# Patient Record
Sex: FEMALE | Race: WHITE | URBAN - METROPOLITAN AREA
[De-identification: names, ages, dates, MRNs, and addresses within clinical notes are randomized per-mention and may not be internally consistent; named-entity substitution may affect disease eponyms.]

---

## 2018-06-07 ENCOUNTER — HOSPITAL ENCOUNTER (OUTPATIENT)
Age: 78
Discharge: HOME | End: 2018-06-09
Payer: MEDICARE

## 2018-06-07 LAB
ALBUMIN SERPL-MCNC: 4.1 G/DL (ref 3.5–5.2)
ALP BLD-CCNC: 69 U/L (ref 35–104)
ALT SERPL-CCNC: 18 U/L (ref 0–32)
ANION GAP SERPL CALCULATED.3IONS-SCNC: 15 MMOL/L (ref 7–16)
AST SERPL-CCNC: 30 U/L (ref 0–31)
BASOPHILS ABSOLUTE: 0.04 E9/L (ref 0–0.2)
BASOPHILS RELATIVE PERCENT: 0.6 % (ref 0–2)
BILIRUB SERPL-MCNC: 0.2 MG/DL (ref 0–1.2)
BUN BLDV-MCNC: 11 MG/DL (ref 8–23)
CALCIUM SERPL-MCNC: 9.4 MG/DL (ref 8.6–10.2)
CHLORIDE BLD-SCNC: 97 MMOL/L (ref 98–107)
CHOLESTEROL, TOTAL: 225 MG/DL (ref 0–199)
CO2: 30 MMOL/L (ref 22–29)
CREAT SERPL-MCNC: 0.6 MG/DL (ref 0.5–1)
EOSINOPHILS ABSOLUTE: 0.22 E9/L (ref 0.05–0.5)
EOSINOPHILS RELATIVE PERCENT: 3.3 % (ref 0–6)
GFR AFRICAN AMERICAN: >60
GFR NON-AFRICAN AMERICAN: >60 ML/MIN/1.73
GLUCOSE BLD-MCNC: 99 MG/DL (ref 74–109)
HCT VFR BLD CALC: 45.7 % (ref 34–48)
HDLC SERPL-MCNC: 57 MG/DL
HEMOGLOBIN: 14.4 G/DL (ref 11.5–15.5)
IMMATURE GRANULOCYTES #: 0.01 E9/L
IMMATURE GRANULOCYTES %: 0.2 % (ref 0–5)
LDL CHOLESTEROL CALCULATED: 147 MG/DL (ref 0–99)
LYMPHOCYTES ABSOLUTE: 3.36 E9/L (ref 1.5–4)
LYMPHOCYTES RELATIVE PERCENT: 50.8 % (ref 20–42)
MCH RBC QN AUTO: 30 PG (ref 26–35)
MCHC RBC AUTO-ENTMCNC: 31.5 % (ref 32–34.5)
MCV RBC AUTO: 95.2 FL (ref 80–99.9)
MONOCYTES ABSOLUTE: 0.69 E9/L (ref 0.1–0.95)
MONOCYTES RELATIVE PERCENT: 10.4 % (ref 2–12)
NEUTROPHILS ABSOLUTE: 2.3 E9/L (ref 1.8–7.3)
NEUTROPHILS RELATIVE PERCENT: 34.7 % (ref 43–80)
PDW BLD-RTO: 14.4 FL (ref 11.5–15)
PLATELET # BLD: 243 E9/L (ref 130–450)
PMV BLD AUTO: 11.5 FL (ref 7–12)
POTASSIUM SERPL-SCNC: 4.4 MMOL/L (ref 3.5–5)
RBC # BLD: 4.8 E12/L (ref 3.5–5.5)
SODIUM BLD-SCNC: 142 MMOL/L (ref 132–146)
TOTAL PROTEIN: 8.3 G/DL (ref 6.4–8.3)
TRIGL SERPL-MCNC: 105 MG/DL (ref 0–149)
TSH SERPL DL<=0.05 MIU/L-ACNC: 2.37 UIU/ML (ref 0.27–4.2)
VITAMIN D 25-HYDROXY: 26 NG/ML (ref 30–100)
VLDLC SERPL CALC-MCNC: 21 MG/DL
WBC # BLD: 6.6 E9/L (ref 4.5–11.5)

## 2018-06-07 PROCEDURE — 80061 LIPID PANEL: CPT

## 2018-06-07 PROCEDURE — 80053 COMPREHEN METABOLIC PANEL: CPT

## 2018-06-07 PROCEDURE — 84443 ASSAY THYROID STIM HORMONE: CPT

## 2018-06-07 PROCEDURE — 82306 VITAMIN D 25 HYDROXY: CPT

## 2018-06-07 PROCEDURE — 85025 COMPLETE CBC W/AUTO DIFF WBC: CPT

## 2019-08-27 ENCOUNTER — HOSPITAL ENCOUNTER (OUTPATIENT)
Age: 79
Discharge: HOME | End: 2019-08-29
Payer: MEDICARE

## 2019-08-27 LAB
ALBUMIN SERPL-MCNC: 4.1 G/DL (ref 3.5–5.2)
ALP BLD-CCNC: 74 U/L (ref 35–104)
ALT SERPL-CCNC: 14 U/L (ref 0–32)
ANION GAP SERPL CALCULATED.3IONS-SCNC: 13 MMOL/L (ref 7–16)
AST SERPL-CCNC: 25 U/L (ref 0–31)
BASOPHILS ABSOLUTE: 0.07 E9/L (ref 0–0.2)
BASOPHILS RELATIVE PERCENT: 0.7 % (ref 0–2)
BILIRUB SERPL-MCNC: 0.2 MG/DL (ref 0–1.2)
BUN BLDV-MCNC: 12 MG/DL (ref 8–23)
CALCIUM SERPL-MCNC: 9.9 MG/DL (ref 8.6–10.2)
CHLORIDE BLD-SCNC: 97 MMOL/L (ref 98–107)
CO2: 30 MMOL/L (ref 22–29)
CREAT SERPL-MCNC: 0.6 MG/DL (ref 0.5–1)
EOSINOPHILS ABSOLUTE: 0.12 E9/L (ref 0.05–0.5)
EOSINOPHILS RELATIVE PERCENT: 1.3 % (ref 0–6)
GFR AFRICAN AMERICAN: >60
GFR NON-AFRICAN AMERICAN: >60 ML/MIN/1.73
GLUCOSE BLD-MCNC: 88 MG/DL (ref 74–99)
HCT VFR BLD CALC: 46.7 % (ref 34–48)
HEMOGLOBIN: 14.7 G/DL (ref 11.5–15.5)
IMMATURE GRANULOCYTES #: 0.02 E9/L
IMMATURE GRANULOCYTES %: 0.2 % (ref 0–5)
LYMPHOCYTES ABSOLUTE: 4 E9/L (ref 1.5–4)
LYMPHOCYTES RELATIVE PERCENT: 42.3 % (ref 20–42)
MCH RBC QN AUTO: 30.5 PG (ref 26–35)
MCHC RBC AUTO-ENTMCNC: 31.5 % (ref 32–34.5)
MCV RBC AUTO: 96.9 FL (ref 80–99.9)
MONOCYTES ABSOLUTE: 0.78 E9/L (ref 0.1–0.95)
MONOCYTES RELATIVE PERCENT: 8.3 % (ref 2–12)
NEUTROPHILS ABSOLUTE: 4.46 E9/L (ref 1.8–7.3)
NEUTROPHILS RELATIVE PERCENT: 47.2 % (ref 43–80)
PDW BLD-RTO: 14 FL (ref 11.5–15)
PLATELET # BLD: 286 E9/L (ref 130–450)
PMV BLD AUTO: 12.1 FL (ref 7–12)
POTASSIUM SERPL-SCNC: 4.6 MMOL/L (ref 3.5–5)
RBC # BLD: 4.82 E12/L (ref 3.5–5.5)
SODIUM BLD-SCNC: 140 MMOL/L (ref 132–146)
TOTAL PROTEIN: 8.3 G/DL (ref 6.4–8.3)
TSH SERPL DL<=0.05 MIU/L-ACNC: 3.01 UIU/ML (ref 0.27–4.2)
VITAMIN D 25-HYDROXY: 30 NG/ML (ref 30–100)
WBC # BLD: 9.5 E9/L (ref 4.5–11.5)

## 2019-08-27 PROCEDURE — 84443 ASSAY THYROID STIM HORMONE: CPT

## 2019-08-27 PROCEDURE — 80053 COMPREHEN METABOLIC PANEL: CPT

## 2019-08-27 PROCEDURE — 85025 COMPLETE CBC W/AUTO DIFF WBC: CPT

## 2019-08-27 PROCEDURE — 82306 VITAMIN D 25 HYDROXY: CPT

## 2020-01-03 ENCOUNTER — HOSPITAL ENCOUNTER (OUTPATIENT)
Age: 80
Discharge: HOME | End: 2020-01-05
Payer: MEDICARE

## 2020-01-03 LAB
ALBUMIN SERPL-MCNC: 4.2 G/DL (ref 3.5–5.2)
ALP BLD-CCNC: 76 U/L (ref 35–104)
ALT SERPL-CCNC: 15 U/L (ref 0–32)
ANION GAP SERPL CALCULATED.3IONS-SCNC: 14 MMOL/L (ref 7–16)
AST SERPL-CCNC: 32 U/L (ref 0–31)
BILIRUB SERPL-MCNC: 0.5 MG/DL (ref 0–1.2)
BUN BLDV-MCNC: 14 MG/DL (ref 8–23)
CALCIUM SERPL-MCNC: 9.6 MG/DL (ref 8.6–10.2)
CHLORIDE BLD-SCNC: 98 MMOL/L (ref 98–107)
CHOLESTEROL, TOTAL: 259 MG/DL (ref 0–199)
CO2: 27 MMOL/L (ref 22–29)
CREAT SERPL-MCNC: 0.6 MG/DL (ref 0.5–1)
GFR AFRICAN AMERICAN: >60
GFR NON-AFRICAN AMERICAN: >60 ML/MIN/1.73
GLUCOSE BLD-MCNC: 103 MG/DL (ref 74–99)
HDLC SERPL-MCNC: 50 MG/DL
LDL CHOLESTEROL CALCULATED: 184 MG/DL (ref 0–99)
POTASSIUM SERPL-SCNC: 4.6 MMOL/L (ref 3.5–5)
SODIUM BLD-SCNC: 139 MMOL/L (ref 132–146)
TOTAL PROTEIN: 8.4 G/DL (ref 6.4–8.3)
TRIGL SERPL-MCNC: 123 MG/DL (ref 0–149)
VLDLC SERPL CALC-MCNC: 25 MG/DL

## 2020-01-03 PROCEDURE — 80061 LIPID PANEL: CPT

## 2020-01-03 PROCEDURE — 80053 COMPREHEN METABOLIC PANEL: CPT

## 2020-09-03 ENCOUNTER — HOSPITAL ENCOUNTER (OUTPATIENT)
Age: 80
Discharge: HOME | End: 2020-09-05
Payer: MEDICARE

## 2020-09-03 LAB
ALBUMIN SERPL-MCNC: 4 G/DL (ref 3.5–5.2)
ALP BLD-CCNC: 70 U/L (ref 35–104)
ALT SERPL-CCNC: 15 U/L (ref 0–32)
ANION GAP SERPL CALCULATED.3IONS-SCNC: 14 MMOL/L (ref 7–16)
AST SERPL-CCNC: 34 U/L (ref 0–31)
BASOPHILS ABSOLUTE: 0.04 E9/L (ref 0–0.2)
BASOPHILS RELATIVE PERCENT: 0.5 % (ref 0–2)
BILIRUB SERPL-MCNC: 0.4 MG/DL (ref 0–1.2)
BUN BLDV-MCNC: 17 MG/DL (ref 8–23)
CALCIUM SERPL-MCNC: 9.3 MG/DL (ref 8.6–10.2)
CHLORIDE BLD-SCNC: 96 MMOL/L (ref 98–107)
CHOLESTEROL, TOTAL: 246 MG/DL (ref 0–199)
CO2: 27 MMOL/L (ref 22–29)
CREAT SERPL-MCNC: 0.8 MG/DL (ref 0.5–1)
EOSINOPHILS ABSOLUTE: 0.14 E9/L (ref 0.05–0.5)
EOSINOPHILS RELATIVE PERCENT: 1.6 % (ref 0–6)
GFR AFRICAN AMERICAN: >60
GFR NON-AFRICAN AMERICAN: >60 ML/MIN/1.73
GLUCOSE BLD-MCNC: 104 MG/DL (ref 74–99)
HCT VFR BLD CALC: 44.6 % (ref 34–48)
HDLC SERPL-MCNC: 56 MG/DL
HEMOGLOBIN: 13.9 G/DL (ref 11.5–15.5)
IMMATURE GRANULOCYTES #: 0.01 E9/L
IMMATURE GRANULOCYTES %: 0.1 % (ref 0–5)
LDL CHOLESTEROL CALCULATED: 165 MG/DL (ref 0–99)
LYMPHOCYTES ABSOLUTE: 3.06 E9/L (ref 1.5–4)
LYMPHOCYTES RELATIVE PERCENT: 35.9 % (ref 20–42)
MCH RBC QN AUTO: 29.5 PG (ref 26–35)
MCHC RBC AUTO-ENTMCNC: 31.2 % (ref 32–34.5)
MCV RBC AUTO: 94.7 FL (ref 80–99.9)
MONOCYTES ABSOLUTE: 0.7 E9/L (ref 0.1–0.95)
MONOCYTES RELATIVE PERCENT: 8.2 % (ref 2–12)
NEUTROPHILS ABSOLUTE: 4.57 E9/L (ref 1.8–7.3)
NEUTROPHILS RELATIVE PERCENT: 53.7 % (ref 43–80)
PDW BLD-RTO: 13.8 FL (ref 11.5–15)
PLATELET # BLD: 303 E9/L (ref 130–450)
PMV BLD AUTO: 12.3 FL (ref 7–12)
POTASSIUM SERPL-SCNC: 4.4 MMOL/L (ref 3.5–5)
RBC # BLD: 4.71 E12/L (ref 3.5–5.5)
SODIUM BLD-SCNC: 137 MMOL/L (ref 132–146)
TOTAL PROTEIN: 7.7 G/DL (ref 6.4–8.3)
TRIGL SERPL-MCNC: 124 MG/DL (ref 0–149)
TSH SERPL DL<=0.05 MIU/L-ACNC: 2.41 UIU/ML (ref 0.27–4.2)
VITAMIN D 25-HYDROXY: 26 NG/ML (ref 30–100)
VLDLC SERPL CALC-MCNC: 25 MG/DL
WBC # BLD: 8.5 E9/L (ref 4.5–11.5)

## 2020-09-03 PROCEDURE — 85025 COMPLETE CBC W/AUTO DIFF WBC: CPT

## 2020-09-03 PROCEDURE — 84443 ASSAY THYROID STIM HORMONE: CPT

## 2020-09-03 PROCEDURE — 80061 LIPID PANEL: CPT

## 2020-09-03 PROCEDURE — 80053 COMPREHEN METABOLIC PANEL: CPT

## 2020-09-03 PROCEDURE — 82306 VITAMIN D 25 HYDROXY: CPT

## 2022-10-31 ENCOUNTER — HOSPITAL ENCOUNTER (OUTPATIENT)
Age: 82
Discharge: HOME | End: 2022-10-31
Payer: MEDICARE

## 2022-10-31 PROCEDURE — 88350 IMFLUOR EA ADDL 1ANTB STN PX: CPT

## 2022-10-31 PROCEDURE — 83516 IMMUNOASSAY NONANTIBODY: CPT

## 2022-10-31 PROCEDURE — 88346 IMFLUOR 1ST 1ANTB STAIN PX: CPT

## 2022-11-08 LAB
Lab: NORMAL
REPORT: NORMAL
THIS TEST SENT TO: NORMAL

## 2023-02-14 ENCOUNTER — HOSPITAL ENCOUNTER (OUTPATIENT)
Dept: GENERAL RADIOLOGY | Age: 83
Discharge: HOME | End: 2023-02-16
Payer: MEDICARE

## 2023-02-14 ENCOUNTER — HOSPITAL ENCOUNTER (OUTPATIENT)
Age: 83
Discharge: HOME | End: 2023-02-14
Payer: MEDICARE

## 2023-02-14 ENCOUNTER — HOSPITAL ENCOUNTER (OUTPATIENT)
Age: 83
Discharge: HOME | End: 2023-02-16
Payer: MEDICARE

## 2023-02-14 DIAGNOSIS — R06.02 SOB (SHORTNESS OF BREATH): ICD-10-CM

## 2023-02-14 LAB
B.E.: 7.6 MMOL/L (ref -3–3)
COHB: 0.9 % (ref 0–1.5)
COMMENT: ABNORMAL
CRITICAL: ABNORMAL
DATE ANALYZED: ABNORMAL
DATE OF COLLECTION: ABNORMAL
HCO3: 33.7 MMOL/L (ref 22–26)
HHB: 21.7 % (ref 0–5)
LAB: ABNORMAL
Lab: ABNORMAL
METHB: 0.4 % (ref 0–1.5)
MODE: ABNORMAL
O2 CONTENT: 12.8 ML/DL
O2 SATURATION: 78 % (ref 92–98.5)
O2HB: 77 % (ref 94–97)
OPERATOR ID: 1119
PATIENT TEMP: 37 C
PCO2: 54.7 MMHG (ref 35–45)
PH BLOOD GAS: 7.41 (ref 7.35–7.45)
PO2: 44.3 MMHG (ref 75–100)
SOURCE, BLOOD GAS: ABNORMAL
THB: 11.8 G/DL (ref 11.5–16.5)
TIME ANALYZED: 1650

## 2023-02-14 PROCEDURE — 36600 WITHDRAWAL OF ARTERIAL BLOOD: CPT

## 2023-02-14 PROCEDURE — 71046 X-RAY EXAM CHEST 2 VIEWS: CPT

## 2023-02-14 PROCEDURE — 82805 BLOOD GASES W/O2 SATURATION: CPT

## 2023-02-24 ENCOUNTER — APPOINTMENT (OUTPATIENT)
Dept: GENERAL RADIOLOGY | Age: 83
DRG: 981 | End: 2023-02-24
Payer: MEDICARE

## 2023-02-24 ENCOUNTER — HOSPITAL ENCOUNTER (INPATIENT)
Age: 83
LOS: 10 days | Discharge: NURSING, REHAB OR LONG-TERM CARE | DRG: 981 | End: 2023-03-06
Attending: EMERGENCY MEDICINE | Admitting: FAMILY MEDICINE
Payer: MEDICARE

## 2023-02-24 DIAGNOSIS — J96.01 ACUTE RESPIRATORY FAILURE WITH HYPOXIA AND HYPERCAPNIA (HCC): Primary | ICD-10-CM

## 2023-02-24 DIAGNOSIS — J96.02 ACUTE RESPIRATORY FAILURE WITH HYPOXIA AND HYPERCAPNIA (HCC): Primary | ICD-10-CM

## 2023-02-24 DIAGNOSIS — R77.8 ELEVATED TROPONIN: ICD-10-CM

## 2023-02-24 DIAGNOSIS — S82.891A CLOSED FRACTURE OF RIGHT ANKLE, INITIAL ENCOUNTER: ICD-10-CM

## 2023-02-24 DIAGNOSIS — D64.9 ANEMIA, UNSPECIFIED TYPE: ICD-10-CM

## 2023-02-24 DIAGNOSIS — M25.551 PAIN OF RIGHT HIP JOINT: ICD-10-CM

## 2023-02-24 DIAGNOSIS — G89.18 ACUTE POST-OPERATIVE PAIN: ICD-10-CM

## 2023-02-24 PROBLEM — J96.22 ACUTE ON CHRONIC RESPIRATORY FAILURE WITH HYPOXIA AND HYPERCAPNIA (HCC): Status: ACTIVE | Noted: 2023-02-24

## 2023-02-24 PROBLEM — L10.2 PEMPHIGUS FOLIACEOUS: Status: ACTIVE | Noted: 2023-02-24

## 2023-02-24 PROBLEM — R79.89 ELEVATED TROPONIN: Status: ACTIVE | Noted: 2023-02-24

## 2023-02-24 PROBLEM — J96.21 ACUTE ON CHRONIC RESPIRATORY FAILURE WITH HYPOXIA AND HYPERCAPNIA (HCC): Status: ACTIVE | Noted: 2023-02-24

## 2023-02-24 PROBLEM — N17.9 AKI (ACUTE KIDNEY INJURY) (HCC): Status: ACTIVE | Noted: 2023-02-24

## 2023-02-24 PROBLEM — I10 PRIMARY HYPERTENSION: Status: ACTIVE | Noted: 2023-02-24

## 2023-02-24 PROBLEM — D50.9 IRON DEFICIENCY ANEMIA: Status: ACTIVE | Noted: 2023-02-24

## 2023-02-24 LAB
AADO2: 315.1 MMHG
AADO2: 368.6 MMHG
AADO2: 380.1 MMHG
AADO2: 451.5 MMHG
ANION GAP SERPL CALCULATED.3IONS-SCNC: 21 MMOL/L (ref 7–16)
ANISOCYTOSIS: ABNORMAL
B.E.: 10.5 MMOL/L (ref -3–3)
B.E.: 2.4 MMOL/L (ref -3–3)
B.E.: 7 MMOL/L (ref -3–3)
B.E.: 8.3 MMOL/L (ref -3–3)
B.E.: 9.2 MMOL/L (ref -3–3)
BASOPHILS ABSOLUTE: 0 E9/L (ref 0–0.2)
BASOPHILS RELATIVE PERCENT: 0.4 % (ref 0–2)
BUN BLDV-MCNC: 33 MG/DL (ref 6–23)
BURR CELLS: ABNORMAL
CALCIUM SERPL-MCNC: 8.2 MG/DL (ref 8.6–10.2)
CHLORIDE BLD-SCNC: 83 MMOL/L (ref 98–107)
CHLORIDE URINE RANDOM: <20 MMOL/L
CO2: 31 MMOL/L (ref 22–29)
COHB: 0.3 % (ref 0–1.5)
COHB: 0.5 % (ref 0–1.5)
COHB: 0.7 % (ref 0–1.5)
COHB: 0.7 % (ref 0–1.5)
COMMENT: ABNORMAL
CREAT SERPL-MCNC: 1.2 MG/DL (ref 0.5–1)
CRITICAL NOTIFICATION: YES
CRITICAL: ABNORMAL
DATE ANALYZED: ABNORMAL
DATE OF COLLECTION: ABNORMAL
DELIVERY SYSTEMS: ABNORMAL
DEVICE: ABNORMAL
EKG ATRIAL RATE: 111 BPM
EKG P AXIS: 37 DEGREES
EKG P-R INTERVAL: 140 MS
EKG Q-T INTERVAL: 338 MS
EKG QRS DURATION: 104 MS
EKG QTC CALCULATION (BAZETT): 459 MS
EKG R AXIS: 38 DEGREES
EKG T AXIS: 26 DEGREES
EKG VENTRICULAR RATE: 111 BPM
EOSINOPHILS ABSOLUTE: 0 E9/L (ref 0.05–0.5)
EOSINOPHILS RELATIVE PERCENT: 0.1 % (ref 0–6)
FIO2: 100
FIO2: 100 %
FIO2: 75 %
FIO2: 80 %
FIO2: 80 %
GFR SERPL CREATININE-BSD FRML MDRD: 45 ML/MIN/1.73
GLUCOSE BLD-MCNC: 213 MG/DL (ref 74–99)
HBA1C MFR BLD: 6.7 % (ref 4–5.6)
HCO3: 34.1 MMOL/L (ref 22–26)
HCO3: 36 MMOL/L (ref 22–26)
HCO3: 38.1 MMOL/L (ref 22–26)
HCO3: 39.6 MMOL/L (ref 22–26)
HCO3: 41.7 MMOL/L (ref 22–26)
HCT VFR BLD CALC: 36.4 % (ref 34–48)
HEMOGLOBIN: 10 G/DL (ref 11.5–15.5)
HHB: 1.9 % (ref 0–5)
HHB: 10.8 % (ref 0–5)
HHB: 12 % (ref 0–5)
HHB: 2.1 % (ref 0–5)
HYPOCHROMIA: ABNORMAL
INFLUENZA A BY PCR: NOT DETECTED
INFLUENZA B BY PCR: NOT DETECTED
IRON SATURATION: 7 % (ref 15–50)
IRON: 26 MCG/DL (ref 37–145)
LAB: ABNORMAL
LV EF: 60 %
LVEF MODALITY: NORMAL
LYMPHOCYTES ABSOLUTE: 0.68 E9/L (ref 1.5–4)
LYMPHOCYTES RELATIVE PERCENT: 5.2 % (ref 20–42)
Lab: ABNORMAL
MCH RBC QN AUTO: 22.1 PG (ref 26–35)
MCHC RBC AUTO-ENTMCNC: 27.5 % (ref 32–34.5)
MCV RBC AUTO: 80.5 FL (ref 80–99.9)
METHB: 0.5 % (ref 0–1.5)
METHB: 0.6 % (ref 0–1.5)
MODE: ABNORMAL
MONOCYTES ABSOLUTE: 0.82 E9/L (ref 0.1–0.95)
MONOCYTES RELATIVE PERCENT: 6.1 % (ref 2–12)
NEUTROPHILS ABSOLUTE: 12.1 E9/L (ref 1.8–7.3)
NEUTROPHILS RELATIVE PERCENT: 88.7 % (ref 43–80)
NUCLEATED RED BLOOD CELLS: 1.7 /100 WBC
O2 CONTENT: 14 ML/DL
O2 CONTENT: 15.2 ML/DL
O2 CONTENT: 15.6 ML/DL
O2 CONTENT: 16.7 ML/DL
O2 SATURATION: 87.9 % (ref 92–98.5)
O2 SATURATION: 89.1 % (ref 92–98.5)
O2 SATURATION: 95.7 % (ref 92–98.5)
O2 SATURATION: 97.9 % (ref 92–98.5)
O2 SATURATION: 98.1 % (ref 92–98.5)
O2HB: 87 % (ref 94–97)
O2HB: 88 % (ref 94–97)
O2HB: 96.6 % (ref 94–97)
O2HB: 97.3 % (ref 94–97)
OPERATOR ID: 2323
OPERATOR ID: 274
OPERATOR ID: ABNORMAL
PATIENT TEMP: 37
PATIENT TEMP: 37 C
PCO2 (TEMP CORRECTED): 90.9 MMHG (ref 35–45)
PCO2: 105.4 MMHG (ref 35–45)
PCO2: 117.6 MMHG (ref 35–45)
PCO2: 52.2 MMHG (ref 35–45)
PCO2: 98.4 MMHG (ref 35–45)
PDW BLD-RTO: 19.6 FL (ref 11.5–15)
PEEP/CPAP: 6 CMH2O
PEEP/CPAP: 8 CMH2O
PFO2: 0.89 MMHG/%
PFO2: 1.01 MMHG/%
PFO2: 1.38 MMHG/%
PFO2: 1.44 MMHG/%
PH (TEMPERATURE CORRECTED): 7.18 (ref 7.35–7.45)
PH BLOOD GAS: 7.17 (ref 7.35–7.45)
PH BLOOD GAS: 7.19 (ref 7.35–7.45)
PH BLOOD GAS: 7.21 (ref 7.35–7.45)
PH BLOOD GAS: 7.46 (ref 7.35–7.45)
PLATELET # BLD: 400 E9/L (ref 130–450)
PMV BLD AUTO: 10.8 FL (ref 7–12)
PO2 (TEMP CORRECTED): 104.3 MMHG (ref 60–80)
PO2: 115.5 MMHG (ref 75–100)
PO2: 138.1 MMHG (ref 75–100)
PO2: 71.1 MMHG (ref 75–100)
PO2: 76 MMHG (ref 75–100)
POC SOURCE: ABNORMAL
POIKILOCYTES: ABNORMAL
POLYCHROMASIA: ABNORMAL
POTASSIUM SERPL-SCNC: 3.6 MMOL/L (ref 3.5–5)
POTASSIUM, UR: 45.1 MMOL/L
PRO-BNP: ABNORMAL PG/ML (ref 0–450)
PS: 16 CMH20
PS: 20 CMH20
RBC # BLD: 4.52 E12/L (ref 3.5–5.5)
RI(T): 3.27
RI(T): 3.29
RI(T): 4.15
RI(T): 5.18
SARS-COV-2, NAAT: NOT DETECTED
SODIUM BLD-SCNC: 135 MMOL/L (ref 132–146)
SODIUM URINE: <20 MMOL/L
SOURCE, BLOOD GAS: ABNORMAL
TARGET CELLS: ABNORMAL
THB: 11.3 G/DL (ref 11.5–16.5)
THB: 11.4 G/DL (ref 11.5–16.5)
THB: 12.1 G/DL (ref 11.5–16.5)
THB: 12.2 G/DL (ref 11.5–16.5)
TIME ANALYZED: 1213
TIME ANALYZED: 1246
TIME ANALYZED: 818
TIME ANALYZED: 949
TOTAL IRON BINDING CAPACITY: 370 MCG/DL (ref 250–450)
TROPONIN, HIGH SENSITIVITY: 51 NG/L (ref 0–9)
TROPONIN, HIGH SENSITIVITY: 66 NG/L (ref 0–9)
WBC # BLD: 13.6 E9/L (ref 4.5–11.5)

## 2023-02-24 PROCEDURE — 99285 EMERGENCY DEPT VISIT HI MDM: CPT

## 2023-02-24 PROCEDURE — 51702 INSERT TEMP BLADDER CATH: CPT

## 2023-02-24 PROCEDURE — 2500000003 HC RX 250 WO HCPCS: Performed by: INTERNAL MEDICINE

## 2023-02-24 PROCEDURE — 36415 COLL VENOUS BLD VENIPUNCTURE: CPT

## 2023-02-24 PROCEDURE — 36569 INSJ PICC 5 YR+ W/O IMAGING: CPT

## 2023-02-24 PROCEDURE — 85025 COMPLETE CBC W/AUTO DIFF WBC: CPT

## 2023-02-24 PROCEDURE — 87635 SARS-COV-2 COVID-19 AMP PRB: CPT

## 2023-02-24 PROCEDURE — 2580000003 HC RX 258: Performed by: INTERNAL MEDICINE

## 2023-02-24 PROCEDURE — C1751 CATH, INF, PER/CENT/MIDLINE: HCPCS

## 2023-02-24 PROCEDURE — 2580000003 HC RX 258: Performed by: EMERGENCY MEDICINE

## 2023-02-24 PROCEDURE — 36600 WITHDRAWAL OF ARTERIAL BLOOD: CPT

## 2023-02-24 PROCEDURE — 94660 CPAP INITIATION&MGMT: CPT

## 2023-02-24 PROCEDURE — 6360000002 HC RX W HCPCS: Performed by: INTERNAL MEDICINE

## 2023-02-24 PROCEDURE — 94640 AIRWAY INHALATION TREATMENT: CPT

## 2023-02-24 PROCEDURE — 83540 ASSAY OF IRON: CPT

## 2023-02-24 PROCEDURE — 73610 X-RAY EXAM OF ANKLE: CPT

## 2023-02-24 PROCEDURE — 99223 1ST HOSP IP/OBS HIGH 75: CPT | Performed by: FAMILY MEDICINE

## 2023-02-24 PROCEDURE — 82803 BLOOD GASES ANY COMBINATION: CPT

## 2023-02-24 PROCEDURE — 80048 BASIC METABOLIC PNL TOTAL CA: CPT

## 2023-02-24 PROCEDURE — 2720000010 HC SURG SUPPLY STERILE

## 2023-02-24 PROCEDURE — 6370000000 HC RX 637 (ALT 250 FOR IP): Performed by: EMERGENCY MEDICINE

## 2023-02-24 PROCEDURE — 83880 ASSAY OF NATRIURETIC PEPTIDE: CPT

## 2023-02-24 PROCEDURE — 73590 X-RAY EXAM OF LOWER LEG: CPT

## 2023-02-24 PROCEDURE — 71045 X-RAY EXAM CHEST 1 VIEW: CPT

## 2023-02-24 PROCEDURE — 73560 X-RAY EXAM OF KNEE 1 OR 2: CPT

## 2023-02-24 PROCEDURE — C8929 TTE W OR WO FOL WCON,DOPPLER: HCPCS

## 2023-02-24 PROCEDURE — 6360000004 HC RX CONTRAST MEDICATION: Performed by: INTERNAL MEDICINE

## 2023-02-24 PROCEDURE — 83550 IRON BINDING TEST: CPT

## 2023-02-24 PROCEDURE — 76937 US GUIDE VASCULAR ACCESS: CPT

## 2023-02-24 PROCEDURE — 87502 INFLUENZA DNA AMP PROBE: CPT

## 2023-02-24 PROCEDURE — 2000000000 HC ICU R&B

## 2023-02-24 PROCEDURE — 93010 ELECTROCARDIOGRAM REPORT: CPT | Performed by: INTERNAL MEDICINE

## 2023-02-24 PROCEDURE — 73502 X-RAY EXAM HIP UNI 2-3 VIEWS: CPT

## 2023-02-24 PROCEDURE — 94664 DEMO&/EVAL PT USE INHALER: CPT

## 2023-02-24 PROCEDURE — 84133 ASSAY OF URINE POTASSIUM: CPT

## 2023-02-24 PROCEDURE — 83036 HEMOGLOBIN GLYCOSYLATED A1C: CPT

## 2023-02-24 PROCEDURE — 84300 ASSAY OF URINE SODIUM: CPT

## 2023-02-24 PROCEDURE — 93005 ELECTROCARDIOGRAM TRACING: CPT | Performed by: EMERGENCY MEDICINE

## 2023-02-24 PROCEDURE — 84484 ASSAY OF TROPONIN QUANT: CPT

## 2023-02-24 PROCEDURE — 82436 ASSAY OF URINE CHLORIDE: CPT

## 2023-02-24 PROCEDURE — 2700000000 HC OXYGEN THERAPY PER DAY

## 2023-02-24 PROCEDURE — 82805 BLOOD GASES W/O2 SATURATION: CPT

## 2023-02-24 RX ORDER — IPRATROPIUM BROMIDE AND ALBUTEROL SULFATE 2.5; .5 MG/3ML; MG/3ML
3 SOLUTION RESPIRATORY (INHALATION) ONCE
Status: COMPLETED | OUTPATIENT
Start: 2023-02-24 | End: 2023-02-24

## 2023-02-24 RX ORDER — ARFORMOTEROL TARTRATE 15 UG/2ML
15 SOLUTION RESPIRATORY (INHALATION) 2 TIMES DAILY
Status: DISCONTINUED | OUTPATIENT
Start: 2023-02-24 | End: 2023-03-06 | Stop reason: HOSPADM

## 2023-02-24 RX ORDER — ONDANSETRON 4 MG/1
4 TABLET, ORALLY DISINTEGRATING ORAL EVERY 8 HOURS PRN
Status: DISCONTINUED | OUTPATIENT
Start: 2023-02-24 | End: 2023-03-06 | Stop reason: HOSPADM

## 2023-02-24 RX ORDER — SODIUM CHLORIDE 9 MG/ML
INJECTION, SOLUTION INTRAVENOUS PRN
Status: DISCONTINUED | OUTPATIENT
Start: 2023-02-24 | End: 2023-03-06 | Stop reason: HOSPADM

## 2023-02-24 RX ORDER — SODIUM CHLORIDE 0.9 % (FLUSH) 0.9 %
5-40 SYRINGE (ML) INJECTION EVERY 12 HOURS SCHEDULED
Status: DISCONTINUED | OUTPATIENT
Start: 2023-02-24 | End: 2023-03-06 | Stop reason: HOSPADM

## 2023-02-24 RX ORDER — HEPARIN SODIUM (PORCINE) LOCK FLUSH IV SOLN 100 UNIT/ML 100 UNIT/ML
3 SOLUTION INTRAVENOUS PRN
Status: DISCONTINUED | OUTPATIENT
Start: 2023-02-24 | End: 2023-03-06 | Stop reason: HOSPADM

## 2023-02-24 RX ORDER — SODIUM CHLORIDE 0.9 % (FLUSH) 0.9 %
5-40 SYRINGE (ML) INJECTION PRN
Status: DISCONTINUED | OUTPATIENT
Start: 2023-02-24 | End: 2023-03-06 | Stop reason: HOSPADM

## 2023-02-24 RX ORDER — NOREPINEPHRINE BIT/0.9 % NACL 16MG/250ML
1-100 INFUSION BOTTLE (ML) INTRAVENOUS CONTINUOUS
Status: DISCONTINUED | OUTPATIENT
Start: 2023-02-24 | End: 2023-02-26

## 2023-02-24 RX ORDER — ACETAMINOPHEN 650 MG/1
650 SUPPOSITORY RECTAL EVERY 6 HOURS PRN
Status: DISCONTINUED | OUTPATIENT
Start: 2023-02-24 | End: 2023-03-06 | Stop reason: HOSPADM

## 2023-02-24 RX ORDER — SODIUM CHLORIDE 9 MG/ML
INJECTION, SOLUTION INTRAVENOUS PRN
Status: DISCONTINUED | OUTPATIENT
Start: 2023-02-24 | End: 2023-02-25

## 2023-02-24 RX ORDER — ALBUTEROL SULFATE 2.5 MG/3ML
2.5 SOLUTION RESPIRATORY (INHALATION)
Status: DISCONTINUED | OUTPATIENT
Start: 2023-02-24 | End: 2023-03-06 | Stop reason: HOSPADM

## 2023-02-24 RX ORDER — ATORVASTATIN CALCIUM 10 MG/1
10 TABLET, FILM COATED ORAL DAILY
Status: DISCONTINUED | OUTPATIENT
Start: 2023-02-24 | End: 2023-03-06 | Stop reason: HOSPADM

## 2023-02-24 RX ORDER — ONDANSETRON 2 MG/ML
4 INJECTION INTRAMUSCULAR; INTRAVENOUS EVERY 6 HOURS PRN
Status: DISCONTINUED | OUTPATIENT
Start: 2023-02-24 | End: 2023-03-06 | Stop reason: HOSPADM

## 2023-02-24 RX ORDER — LIDOCAINE HYDROCHLORIDE 10 MG/ML
5 INJECTION, SOLUTION EPIDURAL; INFILTRATION; INTRACAUDAL; PERINEURAL ONCE
Status: COMPLETED | OUTPATIENT
Start: 2023-02-24 | End: 2023-02-24

## 2023-02-24 RX ORDER — DEXTROSE MONOHYDRATE 100 MG/ML
INJECTION, SOLUTION INTRAVENOUS CONTINUOUS PRN
Status: DISCONTINUED | OUTPATIENT
Start: 2023-02-24 | End: 2023-02-27 | Stop reason: SDUPTHER

## 2023-02-24 RX ORDER — POLYETHYLENE GLYCOL 3350 17 G/17G
17 POWDER, FOR SOLUTION ORAL DAILY PRN
Status: DISCONTINUED | OUTPATIENT
Start: 2023-02-24 | End: 2023-03-06 | Stop reason: HOSPADM

## 2023-02-24 RX ORDER — 0.9 % SODIUM CHLORIDE 0.9 %
1000 INTRAVENOUS SOLUTION INTRAVENOUS ONCE
Status: COMPLETED | OUTPATIENT
Start: 2023-02-24 | End: 2023-02-24

## 2023-02-24 RX ORDER — IPRATROPIUM BROMIDE AND ALBUTEROL SULFATE 2.5; .5 MG/3ML; MG/3ML
1 SOLUTION RESPIRATORY (INHALATION)
Status: DISCONTINUED | OUTPATIENT
Start: 2023-02-24 | End: 2023-02-24 | Stop reason: CLARIF

## 2023-02-24 RX ORDER — ETOMIDATE 2 MG/ML
30 INJECTION INTRAVENOUS ONCE
Status: DISCONTINUED | OUTPATIENT
Start: 2023-02-24 | End: 2023-02-24

## 2023-02-24 RX ORDER — DEXMEDETOMIDINE HYDROCHLORIDE 4 UG/ML
.1-1.5 INJECTION, SOLUTION INTRAVENOUS CONTINUOUS
Status: DISCONTINUED | OUTPATIENT
Start: 2023-02-24 | End: 2023-02-25

## 2023-02-24 RX ORDER — HEPARIN SODIUM (PORCINE) LOCK FLUSH IV SOLN 100 UNIT/ML 100 UNIT/ML
3 SOLUTION INTRAVENOUS EVERY 12 HOURS SCHEDULED
Status: DISCONTINUED | OUTPATIENT
Start: 2023-02-24 | End: 2023-03-06 | Stop reason: HOSPADM

## 2023-02-24 RX ORDER — ENOXAPARIN SODIUM 100 MG/ML
30 INJECTION SUBCUTANEOUS 2 TIMES DAILY
Status: DISCONTINUED | OUTPATIENT
Start: 2023-02-24 | End: 2023-03-02

## 2023-02-24 RX ORDER — ACETAMINOPHEN 325 MG/1
650 TABLET ORAL EVERY 6 HOURS PRN
Status: DISCONTINUED | OUTPATIENT
Start: 2023-02-24 | End: 2023-03-06 | Stop reason: HOSPADM

## 2023-02-24 RX ORDER — ROCURONIUM BROMIDE 10 MG/ML
100 INJECTION, SOLUTION INTRAVENOUS ONCE
Status: DISCONTINUED | OUTPATIENT
Start: 2023-02-24 | End: 2023-02-24

## 2023-02-24 RX ORDER — SODIUM CHLORIDE 9 MG/ML
INJECTION, SOLUTION INTRAVENOUS CONTINUOUS
Status: DISCONTINUED | OUTPATIENT
Start: 2023-02-24 | End: 2023-02-26

## 2023-02-24 RX ADMIN — ALBUTEROL SULFATE 2.5 MG: 2.5 SOLUTION RESPIRATORY (INHALATION) at 07:33

## 2023-02-24 RX ADMIN — ALBUTEROL SULFATE 2.5 MG: 2.5 SOLUTION RESPIRATORY (INHALATION) at 18:10

## 2023-02-24 RX ADMIN — ENOXAPARIN SODIUM 30 MG: 100 INJECTION SUBCUTANEOUS at 07:48

## 2023-02-24 RX ADMIN — NOREPINEPHRINE BITARTRATE 5 MCG/MIN: 1 SOLUTION INTRAVENOUS at 10:17

## 2023-02-24 RX ADMIN — WATER 1000 MG: 1 INJECTION INTRAMUSCULAR; INTRAVENOUS; SUBCUTANEOUS at 07:47

## 2023-02-24 RX ADMIN — PERFLUTREN 1.5 ML: 6.52 INJECTION, SUSPENSION INTRAVENOUS at 10:59

## 2023-02-24 RX ADMIN — LIDOCAINE HYDROCHLORIDE 5 ML: 10 SOLUTION INTRAVENOUS at 12:11

## 2023-02-24 RX ADMIN — Medication 10 ML: at 21:00

## 2023-02-24 RX ADMIN — HYDROCORTISONE SODIUM SUCCINATE 100 MG: 100 INJECTION, POWDER, FOR SOLUTION INTRAMUSCULAR; INTRAVENOUS at 18:42

## 2023-02-24 RX ADMIN — IPRATROPIUM BROMIDE 0.5 MG: 0.5 SOLUTION RESPIRATORY (INHALATION) at 18:10

## 2023-02-24 RX ADMIN — Medication 10 ML: at 07:48

## 2023-02-24 RX ADMIN — IPRATROPIUM BROMIDE AND ALBUTEROL SULFATE 3 AMPULE: .5; 3 SOLUTION RESPIRATORY (INHALATION) at 05:20

## 2023-02-24 RX ADMIN — ALBUTEROL SULFATE 2.5 MG: 2.5 SOLUTION RESPIRATORY (INHALATION) at 15:10

## 2023-02-24 RX ADMIN — AZITHROMYCIN DIHYDRATE 500 MG: 500 INJECTION, POWDER, LYOPHILIZED, FOR SOLUTION INTRAVENOUS at 07:51

## 2023-02-24 RX ADMIN — ANTI-FUNGAL POWDER MICONAZOLE NITRATE TALC FREE: 1.42 POWDER TOPICAL at 15:49

## 2023-02-24 RX ADMIN — SODIUM CHLORIDE 1000 ML: 9 INJECTION, SOLUTION INTRAVENOUS at 05:57

## 2023-02-24 RX ADMIN — Medication 0.2 MCG/KG/HR: at 10:57

## 2023-02-24 RX ADMIN — ENOXAPARIN SODIUM 30 MG: 100 INJECTION SUBCUTANEOUS at 21:00

## 2023-02-24 RX ADMIN — ARFORMOTEROL TARTRATE 15 MCG: 15 SOLUTION RESPIRATORY (INHALATION) at 07:34

## 2023-02-24 RX ADMIN — ARFORMOTEROL TARTRATE 15 MCG: 15 SOLUTION RESPIRATORY (INHALATION) at 18:10

## 2023-02-24 RX ADMIN — ANTI-FUNGAL POWDER MICONAZOLE NITRATE TALC FREE: 1.42 POWDER TOPICAL at 21:00

## 2023-02-24 RX ADMIN — IPRATROPIUM BROMIDE 0.5 MG: 0.5 SOLUTION RESPIRATORY (INHALATION) at 07:33

## 2023-02-24 RX ADMIN — ONDANSETRON 4 MG: 2 INJECTION INTRAMUSCULAR; INTRAVENOUS at 07:49

## 2023-02-24 RX ADMIN — SODIUM CHLORIDE: 9 INJECTION, SOLUTION INTRAVENOUS at 10:20

## 2023-02-24 RX ADMIN — IPRATROPIUM BROMIDE 0.5 MG: 0.5 SOLUTION RESPIRATORY (INHALATION) at 15:10

## 2023-02-24 ASSESSMENT — PAIN DESCRIPTION - DESCRIPTORS
DESCRIPTORS: ACHING
DESCRIPTORS: ACHING

## 2023-02-24 ASSESSMENT — PAIN DESCRIPTION - PAIN TYPE: TYPE: ACUTE PAIN

## 2023-02-24 ASSESSMENT — PAIN SCALES - GENERAL
PAINLEVEL_OUTOF10: 0
PAINLEVEL_OUTOF10: 9
PAINLEVEL_OUTOF10: 10
PAINLEVEL_OUTOF10: 0

## 2023-02-24 ASSESSMENT — PAIN DESCRIPTION - FREQUENCY
FREQUENCY: CONTINUOUS
FREQUENCY: CONTINUOUS

## 2023-02-24 ASSESSMENT — PAIN DESCRIPTION - LOCATION
LOCATION: ANKLE
LOCATION: ANKLE

## 2023-02-24 ASSESSMENT — PAIN - FUNCTIONAL ASSESSMENT: PAIN_FUNCTIONAL_ASSESSMENT: 0-10

## 2023-02-24 ASSESSMENT — LIFESTYLE VARIABLES
HOW OFTEN DO YOU HAVE A DRINK CONTAINING ALCOHOL: NEVER
HOW MANY STANDARD DRINKS CONTAINING ALCOHOL DO YOU HAVE ON A TYPICAL DAY: PATIENT DOES NOT DRINK

## 2023-02-24 ASSESSMENT — PAIN DESCRIPTION - ORIENTATION
ORIENTATION: RIGHT
ORIENTATION: RIGHT

## 2023-02-24 NOTE — CONSULTS
Department of Orthopedic Trauma Surgery  Resident consult note      CHIEF COMPLAINT:   Chief Complaint   Patient presents with    Fall     R. Ankle pain/deformity       HISTORY OF PRESENT ILLNESS:                Patient is a 79 y.o. female who presents with right ankle pain after mechanical fall this morning. Patient states she tripped while walking her cat this morning colder family then called EMS. She was unable to ambulate following the injury she had right ankle pain. Ambulates with a walker at baseline. Denies numbness/tingling/paresthesias. Denies any other orthopedic complaints at this time.          Past Medical History:        Diagnosis Date    Cancer [facility])     uterus  has hysterectomy    Hyperlipidemia     Hypertension      Past Surgical History:        Procedure Laterality Date    APPENDECTOMY      CARPAL TUNNEL RELEASE Right 12/05/2016    right trigger finger middle and ring    CHOLECYSTECTOMY      HYSTERECTOMY (CERVIX STATUS UNKNOWN)       Current Medications:   Current Facility-Administered Medications: sodium chloride flush 0.9 % injection 5-40 mL, 5-40 mL, IntraVENous, 2 times per day  sodium chloride flush 0.9 % injection 5-40 mL, 5-40 mL, IntraVENous, PRN  0.9 % sodium chloride infusion, , IntraVENous, PRN  ondansetron (ZOFRAN-ODT) disintegrating tablet 4 mg, 4 mg, Oral, Q8H PRN **OR** ondansetron (ZOFRAN) injection 4 mg, 4 mg, IntraVENous, Q6H PRN  polyethylene glycol (GLYCOLAX) packet 17 g, 17 g, Oral, Daily PRN  enoxaparin Sodium (LOVENOX) injection 30 mg, 30 mg, SubCUTAneous, BID  acetaminophen (TYLENOL) tablet 650 mg, 650 mg, Oral, Q6H PRN **OR** acetaminophen (TYLENOL) suppository 650 mg, 650 mg, Rectal, Q6H PRN  perflutren lipid microspheres (DEFINITY) injection 1.5 mL, 1.5 mL, IntraVENous, ONCE PRN  glucose chewable tablet 16 g, 4 tablet, Oral, PRN  dextrose bolus 10% 125 mL, 125 mL, IntraVENous, PRN **OR** dextrose bolus 10% 250 mL, 250 mL, IntraVENous, PRN  glucagon (rDNA) injection 1 mg, 1 mg, SubCUTAneous, PRN  dextrose 10 % infusion, , IntraVENous, Continuous PRN  arformoterol tartrate (BROVANA) nebulizer solution 15 mcg, 15 mcg, Nebulization, BID  albuterol (PROVENTIL) nebulizer solution 2.5 mg, 2.5 mg, Nebulization, Q4H WA **AND** ipratropium (ATROVENT) 0.02 % nebulizer solution 0.5 mg, 0.5 mg, Nebulization, Q4H WA  cefTRIAXone (ROCEPHIN) 1,000 mg in sterile water 10 mL IV syringe, 1,000 mg, IntraVENous, Q24H  azithromycin (ZITHROMAX) 500 mg in sodium chloride 0.9 % 250 mL IVPB (Qhwg2Puk), 500 mg, IntraVENous, Q24H  atorvastatin (LIPITOR) tablet 10 mg, 10 mg, Oral, Daily  pantoprazole (PROTONIX) 40 mg in sodium chloride (PF) 0.9 % 10 mL injection, 40 mg, IntraVENous, Daily  lidocaine 1 % (PF) injection 10 mL, 10 mL, Intra-artICUlar, See Admin Instructions  Allergies:  Patient has no known allergies. Social History:   TOBACCO:   reports that she has never smoked. She does not have any smokeless tobacco history on file. ETOH:   has no history on file for alcohol use. DRUGS:   has no history on file for drug use. ACTIVITIES OF DAILY LIVING:    OCCUPATION:    Family History:   History reviewed. No pertinent family history. REVIEW OF SYSTEMS:   Skin: no acute changes  Eyes: no acute changes  Ears/Nose/Throat: no acute changes  Respiratory: No increased work of breathing, no coughing  Cardiovascular: Brisk capillary refill bilaterally, well perfused extremities  Gastrointestinal: no acute changes  Neurologic: no acute changes  MUSCULOSKELETAL:  positive for  pain      PHYSICAL EXAM:    VITALS:  /70   Pulse (!) 106   Temp 97.8 °F (36.6 °C)   Resp 18   Ht 5' 6\" (1.676 m)   Wt 241 lb (109.3 kg)   SpO2 91%   BMI 38.90 kg/m²   Constitutional: Oriented to person, place, and time; Answer questions appropriately  HENT: Head: Atraumatic.    Eyes: EOMI  Neck: Trachea midline  Cardiovascular: Brisk capillary refill to all extremities, extremities well perfused  Pulmonary/Chest: No increased work of breathing, no cough  Abdominal: Non-distended  Neurologic:  Awake, alert and oriented in three planes. No gross deficits   MUSCULOSKELETAL:  Right lower Extremity:  Old superficial abrasions scattered across right lower extremity  Obvious ecchymosis and edema about the right ankle closed over the medial malleoli  Deformity about the ankle. Obvious translation of the talus under the tibia. Tender to palpation about ankle and knee  Able to plantar and dorsiflex ankle with mild pain  Negative logroll  Compartments soft and compressible  Palpable dorsalis pedis and posterior tibialis pulse, brisk cap refill to toes, foot warm and perfused  Subjectively states sensation intact to light touch in sural/deep peroneal/superficial peroneal/saphenous/posterior tibial nerve distributions to foot/ankle  Demonstrates active ankle plantar/dorsiflexion/great toe extension    Secondary Exam:   bilateralUE: No obvious signs of trauma. -TTP to fingers, hand, wrist, forearm, elbow, humerus, shoulder or clavicle. -- Patient able to flex/extend fingers, wrist, elbow and shoulder with active and passive ROM without pain, +2/4 Radial pulse, compartments soft and compressible. leftLE: No obvious signs of trauma. -TTP to foot, ankle, leg, knee, thigh, hip.-- Patient able to flex/extend toes, ankle, knee and hip with active and passive ROM without pain,+2/4 DP & PT pulses, compartments soft and compressible.     Pelvis: -TTP, -Log roll, -Heel strike         DATA:    CBC:   Lab Results   Component Value Date/Time    WBC 13.6 02/24/2023 05:36 AM    RBC 4.52 02/24/2023 05:36 AM    HGB 10.0 02/24/2023 05:36 AM    HCT 36.4 02/24/2023 05:36 AM    MCV 80.5 02/24/2023 05:36 AM    MCH 22.1 02/24/2023 05:36 AM    MCHC 27.5 02/24/2023 05:36 AM    RDW 19.6 02/24/2023 05:36 AM     02/24/2023 05:36 AM    MPV 10.8 02/24/2023 05:36 AM     PT/INR:  No results found for: PROTIME, INR    Radiology Review:  X-ray right ankle and tib-fib demonstrate a bimalleolar fracture subluxation. There is a transverse medial malleolus fracture with associated 25% posterior malleolus fracture and posterior talus subluxation. Right tib-fib film demonstrates a fibular head fracture with displacement. No other fractures or dislocations noted. Poor bone mineral density. IMPRESSION:  Closed, right bimalleolar fracture subluxation  Closed, fibular head fracture    Procedure:  Risk and benefits of bedside reduction were discussed with patient and family. They agree to proceed with ankle block and reduction. 1% lidocaine with epi was injected to the ankle joint to obtain anesthesia. Reduction was then performed and a well-padded 3 sided splint was applied to the ankle. Reduction was confirmed with portable x-rays at bedside. Patient tolerated the procedure well. PLAN:  Nonweightbearing right lower extremity  Well-padded 3 sided splint was placed to the right ankle  maintain splint clean dry and intact  Elevate and ice to reduce swelling and pain  Pending CT right ankle  Pain control and DVT prophylaxis per primary team  No acute intervention planned for this patient at this time. We will reconsider if patient's current health status improves.   Discussed with attending

## 2023-02-24 NOTE — ED PROVIDER NOTES
[address]        Pt Name: Renard Wilson  MRN: [id number]  [place] 1940  Date of evaluation: 2/24/2023  Provider: Nohemi Sneed DO  PCP: Amilcar Brady MD  Note Started: 5:10 AM EST 2/24/23    CHIEF COMPLAINT       Chief Complaint   Patient presents with    Fall     R. Ankle pain/deformity       HISTORY OF PRESENT ILLNESS: 1 or more Elements   History From: patient    Limitations to history : None    Renard Wilson is a 79 y.o. female who presents to the ED for evaluation of a fall. Patient states that she was getting up to walk her dog. She lost her balance and fell injuring her right ankle. She arrived by EMS. While she was here it was noticed that she was hypoxic and hypotensive. She states that she has been short of breath. She does not wear oxygen at home. Has any chest pain. No reported fever or chills. Denies cough. She denies history of COPD or asthma. Patient was unable to after she fell. The ankle was immobilized by EMS. Nursing Notes were all reviewed and agreed with or any disagreements were addressed in the HPI. REVIEW OF EXTERNAL NOTE :           REVIEW OF SYSTEMS :           Positives and Pertinent negatives as per HPI.      SURGICAL HISTORY     Past Surgical History:   Procedure Laterality Date    APPENDECTOMY      CARPAL TUNNEL RELEASE Right 12/05/2016    right trigger finger middle and ring    CHOLECYSTECTOMY      HYSTERECTOMY (CERVIX STATUS UNKNOWN)         CURRENTMEDICATIONS       Previous Medications    CYCLOBENZAPRINE (FLEXERIL) 10 MG TABLET    Take 10 mg by mouth 3 times daily as needed for Muscle spasms    FISH OIL-CHOLECALCIFEROL (FISH OIL + D3 PO)    Take by mouth    FLUCONAZOLE (DIFLUCAN) 150 MG TABLET    Take 150 mg by mouth once    HYDROCODONE-ACETAMINOPHEN (NORCO) 7.5-325 MG PER TABLET    Take 1 tablet by mouth every 6 hours as needed for Pain    LORAZEPAM (ATIVAN) 1 MG TABLET    Take 1 mg by mouth every 6 hours as needed for Anxiety    MELOXICAM (MOBIC PO)    Take by mouth    PILOCARPINE (SALAGEN) 5 MG TABLET    Take 5 mg by mouth 3 times daily    POTASSIUM BICARBONATE (K-LYTE) 25 MEQ DISINTEGRATING TABLET    Take 25 mEq by mouth 2 times daily    SIMVASTATIN (ZOCOR) 10 MG TABLET    Take 10 mg by mouth nightly    TORSEMIDE (DEMADEX) 10 MG TABLET    Take 10 mg by mouth daily Takes half each day    VITAMIN D (CHOLECALCIFEROL) 1000 UNIT TABS TABLET    Take 1,000 Units by mouth daily       ALLERGIES     Patient has no known allergies. FAMILYHISTORY     History reviewed. No pertinent family history. SOCIAL HISTORY       Social History     Tobacco Use    Smoking status: Never       SCREENINGS        Akira Coma Scale  Eye Opening: Spontaneous  Best Verbal Response: Oriented  Best Motor Response: Obeys commands  Acosta Coma Scale Score: 15                CIWA Assessment  BP: 103/87  Heart Rate: (!) 107           PHYSICAL EXAM  1 or more Elements     ED Triage Vitals [02/24/23 0508]   BP Temp Temp src Heart Rate Resp SpO2 Height Weight   -- -- -- (!) 114 20 95 % -- --       Constitutional/General: Alert and oriented x3  Head: Normocephalic and atraumatic  Eyes: PERRL, EOMI, conjunctiva normal, sclera non icteric  ENT:  Oropharynx clear, handling secretions,   Neck: Supple, full ROM, no stridor, no midline cervical spine tenderness  Respiratory: Lungs clear to auscultation bilaterally, no wheezes, rales, or rhonchi. Not in respiratory distress  Cardiovascular:  Regular rate. Regular rhythm. No murmurs, no gallops, no rubs. 2+ distal pulses. Specifically patient has a 2+ DP pulse on right foot. Equal extremity pulses. Chest: No chest wall tenderness  GI:  Abdomen Soft, Non tender, Non distended. No rebound, guarding, or rigidity. No pulsatile masses. Musculoskeletal: Patient does have deformity of the right ankle with edema and ecchymosis.   No open wounds appreciated. No skin tenting. Neurologic: GCS 15, sensation grossly intact in right lower extremity dermatomes  Psychiatric: Normal Affect            DIAGNOSTIC RESULTS   LABS:    Labs Reviewed   BASIC METABOLIC PANEL - Abnormal; Notable for the following components:       Result Value    Chloride 83 (*)     CO2 31 (*)     Anion Gap 21 (*)     Glucose 213 (*)     BUN 33 (*)     Creatinine 1.2 (*)     Calcium 8.2 (*)     All other components within normal limits   CBC WITH AUTO DIFFERENTIAL - Abnormal; Notable for the following components:    WBC 13.6 (*)     Hemoglobin 10.0 (*)     MCH 22.1 (*)     MCHC 27.5 (*)     RDW 19.6 (*)     Neutrophils % 88.7 (*)     Lymphocytes % 5.2 (*)     Neutrophils Absolute 12.10 (*)     Lymphocytes Absolute 0.68 (*)     Eosinophils Absolute 0.00 (*)     All other components within normal limits   TROPONIN - Abnormal; Notable for the following components:    Troponin, High Sensitivity 66 (*)     All other components within normal limits   BRAIN NATRIURETIC PEPTIDE - Abnormal; Notable for the following components:    Pro-BNP 18,200 (*)     All other components within normal limits   ARTERIAL BLOOD GAS, RESPIRATORY ONLY - Abnormal; Notable for the following components:    PH (TEMPERATURE CORRECTED) 7.182 (*)     PCO2 (TEMP CORRECTED) 90.9 (*)     PO2 (TEMP CORRECTED) 104.3 (*)     HCO3 34.1 (*)     All other components within normal limits   COVID-19, RAPID   RAPID INFLUENZA A/B ANTIGENS   ARTERIAL BLOOD GAS, RESPIRATORY ONLY       As interpreted by me, the above displayed labs are abnormal. All other labs obtained during this visit were within normal range or not returned as of this dictation.       EKG Interpretation  Interpreted by emergency department physician, Medardo Sinha DO      EKG Interpretation    Interpreted by emergency department physician    Rhythm: sinus tachycardia  Rate: 111  Axis: normal  Ectopy: premature ventricular contractions (infrequent)  Conduction: normal  ST Segments: normal  T Waves: normal  Q Waves: none    Clinical Impression: sinus tachycardia    Laura Kent, DO          RADIOLOGY:   Non-plain film images such as CT, Ultrasound and MRI are read by the radiologist. Plain radiographic images are visualized and preliminarily interpreted by the ED Provider with the below findings:        Interpretation per the Radiologist below, if available at the time of this note:    XR ANKLE RIGHT (MIN 3 VIEWS)   Final Result   Medial malleolar fracture with subluxation at the ankle manifest as anterior   widening of the ankle joint. Questionable fracture at the posterior   malleolus. See recommendations above. Heel spurs. XR CHEST 1 VIEW   Final Result   No active process allowing for rightward rotation. No results found. No results found. PROCEDURES   Unless otherwise noted below, none          CRITICAL CARE TIME (.cct)   Critical care:  Please note that the withdrawal or failure to initiate urgent interventions for this patient would likely result in a life threatening deterioration or permanent disability. Systems at risk for deterioration include: Acute hypoxic/hypercapnic respiratory failure requiring BiPAP therapy    Accordingly this patient received 42 minutes of critical care time, excluding separately billable procedures. PAST MEDICAL HISTORY/Chronic Conditions Affecting Care      has a past medical history of Cancer ([facility] [address] Hyperlipidemia, and Hypertension.      EMERGENCY DEPARTMENT COURSE    Vitals:    Vitals:    02/24/23 0627 02/24/23 0637 02/24/23 0647 02/24/23 0657   BP:  103/87     Pulse: (!) 108 (!) 106 (!) 103 (!) 107   Resp: 15 30 16 16   Temp:       SpO2: 94% 91% 91% 93%   Weight:       Height:           Patient was given the following medications:  Medications   0.9 % sodium chloride bolus (1,000 mLs IntraVENous New Bag 2/24/23 0557)   ipratropium-albuterol (DUONEB) nebulizer solution 3 ampule (3 ampules Inhalation Given 2/24/23 0520)           Medical Decision Making/Differential Diagnosis:    CC/HPI Summary, Social Determinants of health, Records Reviewed, DDx, testing done/not done, ED Course, Reassessment, disposition considerations/shared decision making with patient, consults, disposition:      ED Course as of 02/24/23 0704   Fri Feb 24, 2023   0533 Patient's ABG shows a pH of 7.18 with a PCO2 of 90.9. We will place patient on BiPAP therapy [MS]   0340 Call has been placed to orthopedics. [MS]   6002 Spoke with Dr. Nicho Quan (Medicine). Discussed case. She will admit this patient   [MS]      ED Course User Index  [MS] Fly Zoeclara, DO        Medical Decision Making  Amount and/or Complexity of Data Reviewed  Labs: ordered. Radiology: ordered. ECG/medicine tests: ordered. Risk  Prescription drug management. Patient presents to the ED for evaluation of a fall. Patient states that she was getting up to walk her dog. She lost her balance and fell injuring her right ankle. She arrived by EMS. While she was here it was noticed that she was hypoxic and hypotensive. She states that she has been short of breath. She does not wear oxygen at home. Has any chest pain. No reported fever or chills. Denies cough. She denies history of COPD or asthma. Patient was unable to after she fell. The ankle was immobilized by EMS. On exam patient had no evidence of craniofacial trauma. No midline cervical spine tenderness. She did have deformity of the right ankle. Pulses intact. Sensation in right foot is intact. Able to move toes without difficulty. No skin tenting or open wounds appreciated. DP pulse is 2+ on the right foot. Patient is neurovascular intact. No signs of respiratory distress however patient was hypoxic upon arrival.  Placed on oxygen. Differential diagnoses for hypoxia includes but not limited to pneumonia, CHF, undiagnosed COPD exacerbation, anginal equivalent.  Workup in the ED consisted of appropriate labs and imaging. The following labs were evaluated interpreted by myself. CBC showed mild leukocytosis with left shift. Patient does have anemia. Hemoglobin of 10. Patient was hemocculted and the stool was Hemoccult negative. BMP shows no evidence of electrolyte abnormalities. Mild renal insufficiency with a creatinine of 1.2. Patient's troponin is elevated at 66. Repeat troponin is pending. Suspect that this is elevated from demand ischemia from her hypoxia. Patient's ABG showed evidence of hypercapnic and hypoxic respiratory failure. At this time she was placed on BiPAP with improved respiratory status. Chest x-ray was obtained which showed no evidence of pneumonia or failure as interpreted by myself. Radiologist confirmed that there was no acute process. Imaging of the right ankle shows obvious fracture of the medial malleolus. Radiologist confirmed that this fracture is present there is also possible subluxation. Questionable fracture of the posterior malleolus. Patient was given Continuous breathing treatment for their symptoms with no improvement. Patient requires continued workup and management of their symptoms and will be admitted to the hospital for further evaluation and treatment. CONSULTS: (Who and What was discussed)  Medicine and orthopedics      I am the Primary Clinician of Record. FINAL IMPRESSION      1. Acute respiratory failure with hypoxia and hypercapnia (HCC)    2. Closed fracture of right ankle, initial encounter    3. Anemia, unspecified type    4. Elevated troponin          DISPOSITION/PLAN     DISPOSITION        PATIENT REFERRED TO:  No follow-up provider specified.     DISCHARGE MEDICATIONS:  New Prescriptions    No medications on file       DISCONTINUED MEDICATIONS:  Discontinued Medications    No medications on file              (Please note that portions of this note were completed with a voice recognition program.  Efforts were made to edit the dictations but occasionally words are mis-transcribed. )    Henry Harrington DO (electronically signed)           Henry Harrington DO  02/24/23 7800

## 2023-02-24 NOTE — ED TRIAGE NOTES
Pt.  Arrrived from home d/t fall. Pulse ox on R/A 44%. 02 applied at [facility] 58. Pulse ox climbed to 77%. NRB placed on pt. And pulse ox climbed and stayed 95-99%. Pt.  States she does not wear home 02. Does admit to increase sob at home.

## 2023-02-24 NOTE — CONSULTS
Pulmonary/Critical Care Progress Note    SUMMARY:  Luli Aguirre is a 79 y.o. female  with a history of HTN, HLD, uterine cancer s/p hysterectomy presents with fall. Developed altered mental status and Acute on Chronic Hypercapneic Respiratory Failure in ER and transferred to ICU    ISSUES:    Acute on chronic respiratory failure with hypoxia and hypercapnia (HCC)  Suspect baseline Obesity Hypoventilation Syndrome   Very borderline / close to intubation when presented to ICU  BiPAP adjusted  Steady improvement in serial ABGs  Mental status improved dramatically  Certainly at high risk for recurrence  Follow closely  OK to come off BiPAP  Needs to use qhs and PRN    Hypotension  Fluids  Levophed  PICC line placed (poor access)    KIMBERLY (acute kidney injury) ([address]  Not sure about recent baseline  Follow    Fall  R Ankle fracture  Splinted - seen by ortho  CT ordered      Elevated troponin  Very elevated BNP  Echo done - ?  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)  Wound care team  Mycostatin      Goals of care  With pt / daughter / grandtr   Pt indicates would agree to intubation if needed          ______________________________________________    SUBJECTIVE:  Altered mental status     OBJECTIVE:  Agitated ,   Acute Respiratory Failure  Hypotensive       MEDICATIONS:   sodium chloride flush  5-40 mL IntraVENous 2 times per day    enoxaparin  30 mg SubCUTAneous BID    arformoterol tartrate  15 mcg Nebulization BID    albuterol  2.5 mg Nebulization Q4H WA    And    ipratropium  0.5 mg Nebulization Q4H WA    cefTRIAXone (ROCEPHIN) IV  1,000 mg IntraVENous Q24H    azithromycin  500 mg IntraVENous Q24H    atorvastatin  10 mg Oral Daily    pantoprazole (PROTONIX) 40 mg injection  40 mg IntraVENous Daily    lidocaine  10 mL Intra-artICUlar See Admin Instructions    miconazole   Topical BID    sodium chloride flush  5-40 mL IntraVENous 2 times per day    heparin flush  3 mL IntraVENous 2 times per day      sodium chloride      dextrose      norepinephrine 9 mcg/min (02/24/23 1333)    fentaNYL 10 mcg/ml in 0.9%  ml infusion Stopped (02/24/23 1315)    dexmedetomidine HCl in NaCl 0.4 mcg/kg/hr (02/24/23 1201)    sodium chloride 75 mL/hr at 02/24/23 1020    sodium chloride       sodium chloride flush, sodium chloride, ondansetron **OR** ondansetron, polyethylene glycol, acetaminophen **OR** acetaminophen, glucose, dextrose bolus **OR** dextrose bolus, glucagon (rDNA), dextrose, sodium chloride flush, sodium chloride, heparin flush      REVIEW OF SYSTEMS:  Unable to provide, intubated    Vitals:    02/24/23 1300   BP: (!) 97/57   Pulse: 66   Resp: 18   Temp:    SpO2: 100%     FiO2 : 60 %  O2 Flow Rate (L/min): 10 L/min  O2 Device: PAP (positive airway pressure)    PHYSICAL EXAM:  General Appearance: altered,   Cardiovascular: normal rate, regular rhythm, normal S1 and S2, no murmurs, rubs, clicks, or gallops, distal pulses intact,   Pulmonary/Chest: clear to auscultation bilaterally- no wheezes, rales or rhonchi, normal air movement, intital respiratory distress  Abdomen: morbidly obese, soft, non-tender, non-distended, normal bowel sounds  Extremities: positive edema, pulse   Skin: diffuse crusted lesions, ++ macerated skin in skin folds, foul smelling   Eyes: pupils equal, round, and reactive to light  Musculoskeletal / Neurological: unable to assess, no clear focal finding, moving 4 limbs         Additional Respiratory Assessments  Heart Rate: 66  Resp: 18  SpO2: 100 %     URINARY CATHETER OUTPUT ([place]):         LABS:    WBC   Date Value Ref Range Status   02/24/2023 13.6 (H) 4.5 - 11.5 E9/L Final   04/22/2021 10.0 4.5 - 11.5 E9/L Final   09/03/2020 8.5 4.5 - 11.5 E9/L Final     Hemoglobin   Date Value Ref Range Status   02/24/2023 10.0 (L) 11.5 - 15.5 g/dL Final   04/22/2021 14.7 11.5 - 15.5 g/dL Final   09/03/2020 13.9 11.5 - 15.5 g/dL Final     Hematocrit   Date Value Ref Range Status   02/24/2023 36.4 34.0 - 48.0 % Final   04/22/2021 47.1 34.0 - 48.0 % Final   09/03/2020 44.6 34.0 - 48.0 % Final     MCV   Date Value Ref Range Status   02/24/2023 80.5 80.0 - 99.9 fL Final   04/22/2021 92.5 80.0 - 99.9 fL Final   09/03/2020 94.7 80.0 - 99.9 fL Final     Platelets   Date Value Ref Range Status   02/24/2023 400 130 - 450 E9/L Final   04/22/2021 346 130 - 450 E9/L Final   09/03/2020 303 130 - 450 E9/L Final     Sodium   Date Value Ref Range Status   02/24/2023 135 132 - 146 mmol/L Final   04/22/2021 140 132 - 146 mmol/L Final   01/14/2021 139 132 - 146 mmol/L Final     Potassium   Date Value Ref Range Status   02/24/2023 3.6 3.5 - 5.0 mmol/L Final   04/22/2021 4.7 3.5 - 5.0 mmol/L Final   01/14/2021 4.3 3.5 - 5.0 mmol/L Final     Chloride   Date Value Ref Range Status   02/24/2023 83 (L) 98 - 107 mmol/L Final   04/22/2021 98 98 - 107 mmol/L Final   01/14/2021 98 98 - 107 mmol/L Final     CO2   Date Value Ref Range Status   02/24/2023 31 (H) 22 - 29 mmol/L Final   04/22/2021 30 (H) 22 - 29 mmol/L Final   01/14/2021 32 (H) 22 - 29 mmol/L Final     BUN   Date Value Ref Range Status   02/24/2023 33 (H) 6 - 23 mg/dL Final   04/22/2021 14 6 - 23 mg/dL Final   01/14/2021 13 8 - 23 mg/dL Final     Creatinine   Date Value Ref Range Status   02/24/2023 1.2 (H) 0.5 - 1.0 mg/dL Final   04/22/2021 0.6 0.5 - 1.0 mg/dL Final   01/14/2021 0.6 0.5 - 1.0 mg/dL Final     Glucose   Date Value Ref Range Status   02/24/2023 213 (H) 74 - 99 mg/dL Final   04/22/2021 107 (H) 74 - 99 mg/dL Final   01/14/2021 83 74 - 99 mg/dL Final   03/02/2012 92 70 - 110 mg/dL Final     Calcium   Date Value Ref Range Status   02/24/2023 8.2 (L) 8.6 - 10.2 mg/dL Final   04/22/2021 10.2 8.6 - 10.2 mg/dL Final   01/14/2021 9.6 8.6 - 10.2 mg/dL Final     Total Protein   Date Value Ref Range Status   04/22/2021 8.3 6.4 - 8.3 g/dL Final   01/14/2021 8.2 6.4 - 8.3 g/dL Final   09/03/2020 7.7 6.4 - 8.3 g/dL Final     Albumin   Date Value Ref Range Status   04/22/2021 4.2 3.5 - 5.2 g/dL Final   01/14/2021 4.3 3.5 - 5.2 g/dL Final   09/03/2020 4.0 3.5 - 5.2 g/dL Final   03/02/2012 4.3 3.2 - 4.8 g/dL Final     Total Bilirubin   Date Value Ref Range Status   04/22/2021 0.5 0.0 - 1.2 mg/dL Final   01/14/2021 0.4 0.0 - 1.2 mg/dL Final   09/03/2020 0.4 0.0 - 1.2 mg/dL Final     Alkaline Phosphatase   Date Value Ref Range Status   04/22/2021 78 35 - 104 U/L Final   01/14/2021 72 35 - 104 U/L Final   09/03/2020 70 35 - 104 U/L Final     AST   Date Value Ref Range Status   04/22/2021 30 0 - 31 U/L Final   01/14/2021 35 (H) 0 - 31 U/L Final   09/03/2020 34 (H) 0 - 31 U/L Final     ALT   Date Value Ref Range Status   04/22/2021 12 0 - 32 U/L Final   01/14/2021 12 0 - 32 U/L Final   09/03/2020 15 0 - 32 U/L Final     Est, Glom Filt Rate   Date Value Ref Range Status   02/24/2023 45 >=60 mL/min/1.73 Final     Comment:     Pediatric calculator link  [email or web address]. [email or web address]  Effective Oct 3, 2022  These results are not intended for use in patients  <25years of age. eGFR results are calculated without  a race factor using the 2021 CKD-EPI equation. Careful  clinical correlation is recommended, particularly when  comparing to results calculated using previous equations. The CKD-EPI equation is less accurate in patients with  extremes of muscle mass, extra-renal metabolism of  creatinine, excessive creatinine ingestion, or following  therapy that affects renal tubular secretion. GFR Non-   Date Value Ref Range Status   04/22/2021 >60 >=60 mL/min/1.73 Final     Comment:     Chronic Kidney Disease: less than 60 ml/min/1.73 sq.m. Kidney Failure: less than 15 ml/min/1.73 sq.m. Results valid for patients 18 years and older.      01/14/2021 >60 >=60 mL/min/1.73 Final     Comment:     Chronic Kidney Disease: less than 60 ml/min/1.73 sq.m. Kidney Failure: less than 15 ml/min/1.73 sq.m. Results valid for patients 18 years and older. 09/03/2020 >60 >=60 mL/min/1.73 Final     Comment:     Chronic Kidney Disease: less than 60 ml/min/1.73 sq.m. Kidney Failure: less than 15 ml/min/1.73 sq.m. Results valid for patients 18 years and older. GFR    Date Value Ref Range Status   04/22/2021 >60  Final   01/14/2021 >60  Final   09/03/2020 >60  Final     No results found for: MG  No results found for: PHOS  Recent Labs     02/24/23  1246   PH 7.456*   PO2 115.5*   PCO2 52.2*   HCO3 36.0*   BE 10.5*   O2SAT 98.1     No results for input(s): CULTRESP in the last 72 hours. Lab Results   Component Value Date/Time    PH 7.456 02/24/2023 12:46 PM    PH 7.193 02/24/2023 12:13 PM    PH 7.206 02/24/2023 09:49 AM    PH 7.168 02/24/2023 08:18 AM    PO2 115.5 02/24/2023 12:46 PM    PO2 71.1 02/24/2023 12:13 PM    PO2 138.1 02/24/2023 09:49 AM    PO2 76.0 02/24/2023 08:18 AM    PCO2 52.2 02/24/2023 12:46 PM    PCO2 105.4 02/24/2023 12:13 PM    PCO2 98.4 02/24/2023 09:49 AM    PCO2 117.6 02/24/2023 08:18 AM    HCO3 36.0 02/24/2023 12:46 PM    HCO3 39.6 02/24/2023 12:13 PM    HCO3 38.1 02/24/2023 09:49 AM    HCO3 41.7 02/24/2023 08:18 AM    O2SAT 98.1 02/24/2023 12:46 PM    O2SAT 87.9 02/24/2023 12:13 PM    O2SAT 97.9 02/24/2023 09:49 AM    O2SAT 89.1 02/24/2023 08:18 AM       RADIOLOGY:  XR HIP RIGHT (2-3 VIEWS)   Final Result   No acute displaced fractures         XR TIBIA FIBULA RIGHT (2 VIEWS)   Final Result   Right fibular neck fracture and questionable lateral tibial plateau fracture   for which noncontrast CT could be considered. Fracture dislocation at the   ankle with restoration of ankle and Lucien E post reduction. Fractures at the   posterior and medial malleoli.          XR KNEE RIGHT (1-2 VIEWS)   Final Result   Right fibular neck fracture and questionable lateral tibial plateau fracture   for which noncontrast CT could be considered. Fracture dislocation at the   ankle with restoration of ankle and Lucien E post reduction. Fractures at the   posterior and medial malleoli. XR ANKLE RIGHT (MIN 3 VIEWS)   Final Result   Right fibular neck fracture and questionable lateral tibial plateau fracture   for which noncontrast CT could be considered. Fracture dislocation at the   ankle with restoration of ankle and Lucien E post reduction. Fractures at the   posterior and medial malleoli. XR ANKLE RIGHT (MIN 3 VIEWS)   Final Result   Medial malleolar fracture with subluxation at the ankle manifest as anterior   widening of the ankle joint. Questionable fracture at the posterior   malleolus. See recommendations above. Heel spurs. XR CHEST 1 VIEW   Final Result   No active process allowing for rightward rotation.          CT ANKLE RIGHT WO CONTRAST    (Results Pending)     (Independently reviewed by me)      CRITICAL CARE TIME:  60 minutes    Anmol Castro M.D  Pulmonary & Critical Care  2/24/2023 2:43 PM

## 2023-02-24 NOTE — Clinical Note
Discharge Plan[de-identified] Other/[facility])   Telemetry/Cardiac Monitoring Required?: Yes

## 2023-02-24 NOTE — PROGRESS NOTES
Consent signed and witnessed, pt examined due to skin disorder and pt picks and scratches causing sores, right arm at elbow in dressing, upper arm in the basilic area noted skin intact no sores or break in skin, basilic vein appreciated and accessed times one attempt, HOB and position changes needed for placement of catheter at measured length, noted [place] at 40CM, tip of catheter in the lower 1/3 of the SVC at the [place]. Pt kiran well, dressed per policy. Site intact, NS flushed and clamped times 2.     DL Power PICC  Placement 2/24/2023    Product number: [id number]   Lot Number: [id number]   Consult:  limited  access, pt in ICU, mult IV medications   Ultrasound: YES/VPS   R Basilic      Upper Arm Circumference: 37CM    Size: 5.5F/55CM    Exposed Length: 2CM    Internal Length: 40CM   Cut: 13CM   Vein Measurement: 0.52CM      Rosalba Ovalles RN  2/24/2023  12:26 PM

## 2023-02-24 NOTE — FLOWSHEET NOTE
Inpatient Wound Care    Admit Date: 2/24/2023  4:38 AM    Reason for consult:  skin care     Significant history:    Past Medical History:   Diagnosis Date    Cancer [facility])     uterus  has hysterectomy    Hyperlipidemia     Hypertension        Wound history:      Findings:     02/24/23 1518   Wound 02/24/23 Arm Lower;Right;Dorsal   Date First Assessed/Time First Assessed: 02/24/23 1523   Present on Hospital Admission: Yes  Primary Wound Type: Skin Tear  Location: Arm  Wound Location Orientation: Lower;Right;Dorsal   Wound Image    Wound Etiology Skin Tear   Wound Cleansed Cleansed with saline   Dressing/Treatment Other (comment)  (versetel)   Wound Length (cm) 2 cm   Wound Width (cm) 1 cm   Wound Surface Area (cm^2) 2 cm^2   Drainage Amount Scant   Drainage Description Serosanguinous   Odor None   Wound 02/24/23 Buttocks Left; Lower   Date First Assessed/Time First Assessed: 02/24/23 1526   Present on Hospital Admission: Yes  Primary Wound Type: Pressure Injury  Location: Buttocks  Wound Location Orientation: Left; Lower   Wound Image    Wound Etiology Pressure Stage 2   Wound Cleansed Cleansed with saline   Dressing/Treatment Foam   Wound Length (cm) 2 cm   Wound Width (cm) 1 cm   Wound Surface Area (cm^2) 2 cm^2   Drainage Amount Small   Drainage Description Serosanguinous   Odor None     Right scapula                                Nose redness    Uner breast    Right groin    Right mid abd  **Informed Consent**    The patient has given verbal consent to have photos taken of wounds and inserted into their chart as part of their permanent medical record for purposes of documentation, treatment management and/or medical review. All Images taken on 2/24/23 of patient name: Albanian Cynthia were transmitted and stored on "[employer] located within [facility] by a registered [employer] Mobile Application Device.         Impression:  sin tears to right arm    Pt has a history of pemphigus Foliaceous    Under breasts and inner groins appears as yeast    Left buttocks stage 2    Interventions in place:  pt on low air loss bed  Antifungal powder is used    Plan:  Cont antifungal  Vesitel to skin tears  Mepilex to open areas on buttocks    Recommend  Dermatology consult  Resp to see for mask for bipap      Bladimir Carrillo RN 2/24/2023 3:27 PM

## 2023-02-24 NOTE — PLAN OF CARE
Patient seen earlier today in the emergency room when she was in respiratory distress and worsening hypercapnic respiratory failure. I decided to upgrade patient to ICU as I was concerned she was going to need to be intubated. I conferred with the intensivist who made some changes to BiPAP placing on 20/6. Upon arrival to ICU, patient was monitored closely but was slowly starting to wake up and repeat ABG starting to show improvement in her hypercapnia. Decision was made to hold off on intubation. Patient had poor IV access so the team was consulted for PICC line and that was placed. 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 Wound care was consulted and made recommendations including miconazole powder interventions for keeping skin dry. Patient was weaned off Precedex as patient did develop some hypotension and needed to be started on Levophed. Nursing to determine the patient was supposed be on steroid therapy for his skin and had been on and off steroids for the past 8-months. Given her acute hypotension, decided to place patient on stress dose steroids in the form of hydrocortisone 100 mg every 8hrs.     Case discussed with Dr. Giovanna Villa of pulmonary/critical care earlier today and again early this evening

## 2023-02-24 NOTE — PROGRESS NOTES
Date:2023  Patient Name: Donita Rviera  MRN: [id number]  : 1940  ROOM #: IC03/IC03-01    Occupational Therapy order received, chart reviewed and evaluation attempted this date. Patient is unavailable for OT evaluation due to strict bedrest orders and Dr Ziggy Madden contacted by PT and requested to hold today only . Will re-attempt OT evaluation at a later time. Thank you.      Melody Lambert OTR/L #[id number]

## 2023-02-24 NOTE — PROGRESS NOTES
Physical Therapy    Room #: IC03/IC03-01  Date: 2023       Patient Name: Juan F Alexander  : 1940      MRN: [id number]     Patient unavailable for physical therapy eval due to  strict bedrest and Dr Fabiola Horowitz contacted and requested for hold today only . Will attempt PT evaluation  when strict bedrest lifted . Thank you.        Alejandro Nuñez, PT

## 2023-02-24 NOTE — H&P
[address] [place] [address] Hospitalist Group   History and Physical      CHIEF COMPLAINT:  fall    History of Present Illness:  79 y.o. female with a history of HTN, HLD, uterine cancer s/p hysterectomy presents with fall. Was just going back inside after taking her dog out to go to the bathroom when she lost her balance as she turned while closing the door. ON arrival to ED was found to have pulse ox of 44%, per ED note came up to 77% with 4L, and then 95% with NRB. After ABG returned with pH of 7.1 and pCO2 90, was placed on BIPAP. Denies SOB. Workup in ED significant for hgb 10 (baseline 14), creatinine 1.2 (baseline 0.6), anion gap 21, CO2 31, glucose 213, pro-BNP 21295, trop 66, WBC 13.6. No echo in EMR. CXR no acute findings. Informant(s) for H&P: patient, daughter, chart    REVIEW OF SYSTEMS:  no fevers, chills, cp, sob, n/v, ha, vision/hearing changes, wt changes, hot/cold flashes, other open skin lesions, diarrhea, constipation, dysuria/hematuria unless noted in HPI. Complete ROS performed with the patient and is otherwise negative. PMH:  Past Medical History:   Diagnosis Date    Cancer [facility])     uterus  has hysterectomy    Hyperlipidemia     Hypertension        Surgical History:  Past Surgical History:   Procedure Laterality Date    APPENDECTOMY      CARPAL TUNNEL RELEASE Right 12/05/2016    right trigger finger middle and ring    CHOLECYSTECTOMY      HYSTERECTOMY (CERVIX STATUS UNKNOWN)         Medications Prior to Admission:    Prior to Admission medications    Medication Sig Start Date End Date Taking?  Authorizing Provider   LORazepam (ATIVAN) 1 MG tablet Take 1 mg by mouth every 6 hours as needed for Anxiety    Historical Provider, MD   vitamin D (CHOLECALCIFEROL) 1000 UNIT TABS tablet Take 1,000 Units by mouth daily    Historical Provider, MD   Meloxicam (MOBIC PO) Take by mouth    Historical Provider, MD   simvastatin (ZOCOR) 10 MG tablet Take 10 mg by mouth nightly Historical Provider, MD   torsemide (DEMADEX) 10 MG tablet Take 10 mg by mouth daily Takes half each day    Historical Provider, MD   potassium bicarbonate (K-LYTE) 25 MEQ disintegrating tablet Take 25 mEq by mouth 2 times daily    Historical Provider, MD   Fish Oil-Cholecalciferol (FISH OIL + D3 PO) Take by mouth    Historical Provider, MD   HYDROcodone-acetaminophen (NORCO) 7.5-325 MG per tablet Take 1 tablet by mouth every 6 hours as needed for Pain    Historical Provider, MD   pilocarpine (SALAGEN) 5 MG tablet Take 5 mg by mouth 3 times daily    Historical Provider, MD   cyclobenzaprine (FLEXERIL) 10 MG tablet Take 10 mg by mouth 3 times daily as needed for Muscle spasms    Historical Provider, MD   fluconazole (DIFLUCAN) 150 MG tablet Take 150 mg by mouth once    Historical Provider, MD       Allergies:    Patient has no known allergies. Social History:    reports that she has never smoked. She does not have any smokeless tobacco history on file. Family History:   family history is not on file.      PHYSICAL EXAM:  Vitals:  /87   Pulse (!) 107   Temp 97.8 °F (36.6 °C)   Resp 16   Ht 5' 6\" (1.676 m)   Wt 241 lb (109.3 kg)   SpO2 93%   BMI 38.90 kg/m²     Constitutional:  Elderly, obese, NAD, awake, alert, bipap mask in place  Eyes: no scleral icterus, normal lids, no discharge  ENMT:  Normocephalic, atraumatic, mucosa dry, EOMI  Neck:  trachea midline, no JVD  Lungs:  CTA bilaterally, no audible rhonchi or wheezes noted, respirations unlabored, no retractions  Heart[de-identified]  RRR, tachycardic, no murmur, rub, or gallop noted during exam  Abd:  Soft, non tender, non distended, bowel sounds present  :  deferred  MSK: sarcopenia present  Ext:  Moving all extremities, right ankle edema with external rotation of foot, pulses present  Skin:  Warm and dry, erythematous scaly patch under chin, ecchymosis right ankle  Psych: non-anxious affect  Neuro:  PERRL, Alert, grossly nonfocal; following commands    LABS:  Recent Labs     02/24/23  0536      K 3.6   CL 83*   CO2 31*   BUN 33*   CREATININE 1.2*   GLUCOSE 213*   CALCIUM 8.2*       Recent Labs     02/24/23  0536   WBC 13.6*   RBC 4.52   HGB 10.0*   HCT 36.4   MCV 80.5   MCH 22.1*   MCHC 27.5*   RDW 19.6*      MPV 10.8       No results for input(s): POCGLU in the last 72 hours. Radiology: XR ANKLE RIGHT (MIN 3 VIEWS)    Result Date: 2/24/2023  EXAMINATION: THREE XRAY VIEWS OF THE RIGHT ANKLE 2/24/2023 5:06 am COMPARISON: None. HISTORY: ORDERING SYSTEM PROVIDED HISTORY: fall TECHNOLOGIST PROVIDED HISTORY: Reason for exam:->fall FINDINGS: Transverse fracture at the medial malleolus is mildly displaced and there is soft tissue swelling medially and anteriorly. There is a questionable fracture at the posterior malleolus. Consider a lateral view which is slightly rotated internally to eliminate superimposition of the lateral malleolus over the posterior malleolus. The ankle joint space is widened anteriorly. There are small plantar and Achilles heel spurs. Medial malleolar fracture with subluxation at the ankle manifest as anterior widening of the ankle joint. Questionable fracture at the posterior malleolus. See recommendations above. Heel spurs. XR CHEST 1 VIEW    Result Date: 2/24/2023  EXAMINATION: ONE XRAY VIEW OF THE CHEST 2/24/2023 5:06 am COMPARISON: 14 February 2023 HISTORY: ORDERING SYSTEM PROVIDED HISTORY: sob TECHNOLOGIST PROVIDED HISTORY: Reason for exam:->sob FINDINGS: Distortion from rightward rotation is evident. Unchanged cardiomediastinal silhouette with normal pulmonary vascularity. Lungs are clear. Neither costophrenic angle is blunted. No active process allowing for rightward rotation.        EKG: ST    ASSESSMENT:      Principal Problem:    Acute on chronic respiratory failure with hypoxia and hypercapnia (HCC)  Active Problems:    Elevated troponin    Iron deficiency anemia    KIMBERLY (acute kidney injury) ([facility] [address]    Primary hypertension    Pemphigus foliaceous  Resolved Problems:    * No resolved hospital problems. *      PLAN:    Acute hypercapneic and hypoxic respiratory failure  Rule out new onset CHF  Rule out community acquired pneumonia  SIRS vs sepsis  - BIPAP  - repeat ABG to see if pCO2 trending down  - pro-BNP elevated at 18,200  - pulmonology c/s  - CT chest, does have autoimmune disease, increased risk of ILD/autoimmune lung disease  - start Rocephin/Zithromax pending CT chest results  - check procalcitonin    KIMBERLY  - received 1L bolus in ED, will hold off on more IVF until determine if patient is in acute CHF exacerbation  - creatinine 1.2, baseline 0.6  - trend creatinine    Elevated glucose without diagnosis of diabetes  - check hgb A1C    Elevated troponin  - repeat troponin    Acute anemia  Rule out GI bleed  - hemoccult  - check iron/tibc  - consider GI consult  - PPI    HTN  - hypotensive in ED, will hold antihypertensives    HLD  - continue home zocor  - last lipid panel was April 2021, will check lipid panel    Pemphigus Foliaceus  - gets a lotion from [facility]  - was to start steroids today, will hold off on that    Code Status: Full  DVT prophylaxis: Lovenox    NOTE: This report was transcribed using voice recognition software. Every effort was made to ensure accuracy; however, inadvertent computerized transcription errors may be present.      Electronically signed by Christopher Del Rosario DO on 2/24/2023 at 7:03 AM

## 2023-02-25 ENCOUNTER — APPOINTMENT (OUTPATIENT)
Dept: CT IMAGING | Age: 83
DRG: 981 | End: 2023-02-25
Payer: MEDICARE

## 2023-02-25 LAB
ALBUMIN SERPL-MCNC: 2.7 G/DL (ref 3.5–5.2)
ALP BLD-CCNC: 68 U/L (ref 35–104)
ALT SERPL-CCNC: 7 U/L (ref 0–32)
ANION GAP SERPL CALCULATED.3IONS-SCNC: 10 MMOL/L (ref 7–16)
ANISOCYTOSIS: ABNORMAL
AST SERPL-CCNC: 16 U/L (ref 0–31)
B.E.: 8.6 MMOL/L (ref -3–3)
BACTERIA: ABNORMAL /HPF
BASOPHILS ABSOLUTE: 0 E9/L (ref 0–0.2)
BASOPHILS RELATIVE PERCENT: 0.1 % (ref 0–2)
BILIRUB SERPL-MCNC: 0.7 MG/DL (ref 0–1.2)
BILIRUBIN URINE: ABNORMAL
BLOOD, URINE: ABNORMAL
BUN BLDV-MCNC: 37 MG/DL (ref 6–23)
CALCIUM SERPL-MCNC: 7.6 MG/DL (ref 8.6–10.2)
CHLORIDE BLD-SCNC: 91 MMOL/L (ref 98–107)
CHOLESTEROL, TOTAL: 87 MG/DL (ref 0–199)
CLARITY: CLEAR
CO2: 34 MMOL/L (ref 22–29)
COLOR: ABNORMAL
CREAT SERPL-MCNC: 1 MG/DL (ref 0.5–1)
DELIVERY SYSTEMS: ABNORMAL
DEVICE: ABNORMAL
EOSINOPHILS ABSOLUTE: 0 E9/L (ref 0.05–0.5)
EOSINOPHILS RELATIVE PERCENT: 0 % (ref 0–6)
EPITHELIAL CELLS, UA: ABNORMAL /HPF
FIO2: 60
GFR SERPL CREATININE-BSD FRML MDRD: 56 ML/MIN/1.73
GLUCOSE BLD-MCNC: 149 MG/DL (ref 74–99)
GLUCOSE URINE: NEGATIVE MG/DL
HCO3: 32.3 MMOL/L (ref 22–26)
HCT VFR BLD CALC: 34.8 % (ref 34–48)
HDLC SERPL-MCNC: 23 MG/DL
HEMOGLOBIN: 9.7 G/DL (ref 11.5–15.5)
HYPOCHROMIA: ABNORMAL
KETONES, URINE: ABNORMAL MG/DL
LDL CHOLESTEROL CALCULATED: 46 MG/DL (ref 0–99)
LEUKOCYTE ESTERASE, URINE: ABNORMAL
LYMPHOCYTES ABSOLUTE: 0.84 E9/L (ref 1.5–4)
LYMPHOCYTES RELATIVE PERCENT: 5.2 % (ref 20–42)
MCH RBC QN AUTO: 21.4 PG (ref 26–35)
MCHC RBC AUTO-ENTMCNC: 27.9 % (ref 32–34.5)
MCV RBC AUTO: 76.8 FL (ref 80–99.9)
MONOCYTES ABSOLUTE: 0.33 E9/L (ref 0.1–0.95)
MONOCYTES RELATIVE PERCENT: 1.7 % (ref 2–12)
NEUTROPHILS ABSOLUTE: 15.53 E9/L (ref 1.8–7.3)
NEUTROPHILS RELATIVE PERCENT: 93 % (ref 43–80)
NITRITE, URINE: POSITIVE
O2 SATURATION: 96.7 % (ref 92–98.5)
OPERATOR ID: 2323
OVALOCYTES: ABNORMAL
PATIENT TEMP: 37
PCO2 (TEMP CORRECTED): 40.2 MMHG (ref 35–45)
PDW BLD-RTO: 19.1 FL (ref 11.5–15)
PH (TEMPERATURE CORRECTED): 7.51 (ref 7.35–7.45)
PH UA: 6 (ref 5–9)
PHOSPHORUS: 2 MG/DL (ref 2.5–4.5)
PLATELET # BLD: 352 E9/L (ref 130–450)
PMV BLD AUTO: 11.4 FL (ref 7–12)
PO2 (TEMP CORRECTED): 78.9 MMHG (ref 60–80)
POC SOURCE: ABNORMAL
POIKILOCYTES: ABNORMAL
POLYCHROMASIA: ABNORMAL
POSITIVE END EXP PRESS: 6 CMH2O
POTASSIUM REFLEX MAGNESIUM: 3.6 MMOL/L (ref 3.5–5)
PRESSURE SUPPORT: 18 CMH2O
PROCALCITONIN: 0.25 NG/ML (ref 0–0.08)
PROTEIN UA: ABNORMAL MG/DL
RBC # BLD: 4.53 E12/L (ref 3.5–5.5)
RBC UA: ABNORMAL /HPF (ref 0–2)
SODIUM BLD-SCNC: 135 MMOL/L (ref 132–146)
SPECIFIC GRAVITY UA: >=1.03 (ref 1–1.03)
SPHEROCYTES: ABNORMAL
TARGET CELLS: ABNORMAL
TOTAL PROTEIN: 5.9 G/DL (ref 6.4–8.3)
TRIGL SERPL-MCNC: 90 MG/DL (ref 0–149)
UROBILINOGEN, URINE: 1 E.U./DL
VACUOLATED NEUTROPHILS: ABNORMAL
VLDLC SERPL CALC-MCNC: 18 MG/DL
WBC # BLD: 16.7 E9/L (ref 4.5–11.5)
WBC UA: ABNORMAL /HPF (ref 0–5)

## 2023-02-25 PROCEDURE — 6360000002 HC RX W HCPCS: Performed by: FAMILY MEDICINE

## 2023-02-25 PROCEDURE — 85025 COMPLETE CBC W/AUTO DIFF WBC: CPT

## 2023-02-25 PROCEDURE — 6360000002 HC RX W HCPCS: Performed by: INTERNAL MEDICINE

## 2023-02-25 PROCEDURE — 84100 ASSAY OF PHOSPHORUS: CPT

## 2023-02-25 PROCEDURE — 87040 BLOOD CULTURE FOR BACTERIA: CPT

## 2023-02-25 PROCEDURE — 94640 AIRWAY INHALATION TREATMENT: CPT

## 2023-02-25 PROCEDURE — 36592 COLLECT BLOOD FROM PICC: CPT

## 2023-02-25 PROCEDURE — 84145 PROCALCITONIN (PCT): CPT

## 2023-02-25 PROCEDURE — 2580000003 HC RX 258

## 2023-02-25 PROCEDURE — 73700 CT LOWER EXTREMITY W/O DYE: CPT

## 2023-02-25 PROCEDURE — 2700000000 HC OXYGEN THERAPY PER DAY

## 2023-02-25 PROCEDURE — C9113 INJ PANTOPRAZOLE SODIUM, VIA: HCPCS | Performed by: FAMILY MEDICINE

## 2023-02-25 PROCEDURE — 97161 PT EVAL LOW COMPLEX 20 MIN: CPT | Performed by: PHYSICAL THERAPIST

## 2023-02-25 PROCEDURE — 2500000003 HC RX 250 WO HCPCS

## 2023-02-25 PROCEDURE — 87088 URINE BACTERIA CULTURE: CPT

## 2023-02-25 PROCEDURE — 2580000003 HC RX 258: Performed by: FAMILY MEDICINE

## 2023-02-25 PROCEDURE — 82803 BLOOD GASES ANY COMBINATION: CPT

## 2023-02-25 PROCEDURE — 87081 CULTURE SCREEN ONLY: CPT

## 2023-02-25 PROCEDURE — A4216 STERILE WATER/SALINE, 10 ML: HCPCS | Performed by: FAMILY MEDICINE

## 2023-02-25 PROCEDURE — 6370000000 HC RX 637 (ALT 250 FOR IP)

## 2023-02-25 PROCEDURE — 99233 SBSQ HOSP IP/OBS HIGH 50: CPT | Performed by: INTERNAL MEDICINE

## 2023-02-25 PROCEDURE — 97110 THERAPEUTIC EXERCISES: CPT | Performed by: PHYSICAL THERAPIST

## 2023-02-25 PROCEDURE — 6370000000 HC RX 637 (ALT 250 FOR IP): Performed by: INTERNAL MEDICINE

## 2023-02-25 PROCEDURE — 97530 THERAPEUTIC ACTIVITIES: CPT | Performed by: PHYSICAL THERAPIST

## 2023-02-25 PROCEDURE — 2000000000 HC ICU R&B

## 2023-02-25 PROCEDURE — 80053 COMPREHEN METABOLIC PANEL: CPT

## 2023-02-25 PROCEDURE — 81001 URINALYSIS AUTO W/SCOPE: CPT

## 2023-02-25 PROCEDURE — 2500000003 HC RX 250 WO HCPCS: Performed by: INTERNAL MEDICINE

## 2023-02-25 PROCEDURE — 2580000003 HC RX 258: Performed by: INTERNAL MEDICINE

## 2023-02-25 PROCEDURE — 80061 LIPID PANEL: CPT

## 2023-02-25 PROCEDURE — 36415 COLL VENOUS BLD VENIPUNCTURE: CPT

## 2023-02-25 PROCEDURE — 94660 CPAP INITIATION&MGMT: CPT

## 2023-02-25 PROCEDURE — 6370000000 HC RX 637 (ALT 250 FOR IP): Performed by: FAMILY MEDICINE

## 2023-02-25 RX ORDER — LATANOPROST 50 UG/ML
1 SOLUTION/ DROPS OPHTHALMIC NIGHTLY
Status: DISCONTINUED | OUTPATIENT
Start: 2023-02-25 | End: 2023-03-06 | Stop reason: HOSPADM

## 2023-02-25 RX ORDER — LATANOPROST 50 UG/ML
1 SOLUTION/ DROPS OPHTHALMIC NIGHTLY
COMMUNITY

## 2023-02-25 RX ORDER — TRIAMCINOLONE ACETONIDE 1 MG/G
CREAM TOPICAL 2 TIMES DAILY
Status: DISCONTINUED | OUTPATIENT
Start: 2023-02-25 | End: 2023-03-06 | Stop reason: HOSPADM

## 2023-02-25 RX ORDER — TRIAMCINOLONE ACETONIDE 1 MG/G
1 CREAM TOPICAL 2 TIMES DAILY
COMMUNITY

## 2023-02-25 RX ORDER — HYDROCODONE BITARTRATE AND ACETAMINOPHEN 5; 325 MG/1; MG/1
1 TABLET ORAL EVERY 6 HOURS PRN
Status: DISCONTINUED | OUTPATIENT
Start: 2023-02-25 | End: 2023-03-03

## 2023-02-25 RX ORDER — FUROSEMIDE 20 MG/1
20 TABLET ORAL DAILY
Status: ON HOLD | COMMUNITY
End: 2023-03-06 | Stop reason: HOSPADM

## 2023-02-25 RX ADMIN — ALBUTEROL SULFATE 2.5 MG: 2.5 SOLUTION RESPIRATORY (INHALATION) at 17:34

## 2023-02-25 RX ADMIN — ANTI-FUNGAL POWDER MICONAZOLE NITRATE TALC FREE: 1.42 POWDER TOPICAL at 20:33

## 2023-02-25 RX ADMIN — Medication 0.2 MCG/KG/HR: at 08:01

## 2023-02-25 RX ADMIN — HEPARIN 300 UNITS: 100 SYRINGE at 00:17

## 2023-02-25 RX ADMIN — HYDROCORTISONE SODIUM SUCCINATE 100 MG: 100 INJECTION, POWDER, FOR SOLUTION INTRAMUSCULAR; INTRAVENOUS at 10:43

## 2023-02-25 RX ADMIN — SODIUM CHLORIDE, PRESERVATIVE FREE 10 ML: 5 INJECTION INTRAVENOUS at 17:35

## 2023-02-25 RX ADMIN — HYDROCORTISONE SODIUM SUCCINATE 100 MG: 100 INJECTION, POWDER, FOR SOLUTION INTRAMUSCULAR; INTRAVENOUS at 03:16

## 2023-02-25 RX ADMIN — HYDROCODONE BITARTRATE AND ACETAMINOPHEN 1 TABLET: 5; 325 TABLET ORAL at 20:33

## 2023-02-25 RX ADMIN — SODIUM CHLORIDE: 9 INJECTION, SOLUTION INTRAVENOUS at 00:16

## 2023-02-25 RX ADMIN — ARFORMOTEROL TARTRATE 15 MCG: 15 SOLUTION RESPIRATORY (INHALATION) at 04:57

## 2023-02-25 RX ADMIN — ENOXAPARIN SODIUM 30 MG: 100 INJECTION SUBCUTANEOUS at 07:48

## 2023-02-25 RX ADMIN — IPRATROPIUM BROMIDE 0.5 MG: 0.5 SOLUTION RESPIRATORY (INHALATION) at 17:34

## 2023-02-25 RX ADMIN — HYDROCORTISONE SODIUM SUCCINATE 100 MG: 100 INJECTION, POWDER, FOR SOLUTION INTRAMUSCULAR; INTRAVENOUS at 17:35

## 2023-02-25 RX ADMIN — ATORVASTATIN CALCIUM 10 MG: 10 TABLET, FILM COATED ORAL at 07:49

## 2023-02-25 RX ADMIN — SODIUM CHLORIDE 40 MG: 9 INJECTION, SOLUTION INTRAMUSCULAR; INTRAVENOUS; SUBCUTANEOUS at 07:48

## 2023-02-25 RX ADMIN — POTASSIUM PHOSPHATE, MONOBASIC POTASSIUM PHOSPHATE, DIBASIC 20 MMOL: 224; 236 INJECTION, SOLUTION, CONCENTRATE INTRAVENOUS at 07:00

## 2023-02-25 RX ADMIN — ARFORMOTEROL TARTRATE 15 MCG: 15 SOLUTION RESPIRATORY (INHALATION) at 17:34

## 2023-02-25 RX ADMIN — LATANOPROST 1 DROP: 50 SOLUTION OPHTHALMIC at 20:34

## 2023-02-25 RX ADMIN — AZITHROMYCIN DIHYDRATE 500 MG: 500 INJECTION, POWDER, LYOPHILIZED, FOR SOLUTION INTRAVENOUS at 07:59

## 2023-02-25 RX ADMIN — ALBUTEROL SULFATE 2.5 MG: 2.5 SOLUTION RESPIRATORY (INHALATION) at 09:11

## 2023-02-25 RX ADMIN — WATER 1000 MG: 1 INJECTION INTRAMUSCULAR; INTRAVENOUS; SUBCUTANEOUS at 07:48

## 2023-02-25 RX ADMIN — IPRATROPIUM BROMIDE 0.5 MG: 0.5 SOLUTION RESPIRATORY (INHALATION) at 09:11

## 2023-02-25 RX ADMIN — ENOXAPARIN SODIUM 30 MG: 100 INJECTION SUBCUTANEOUS at 20:33

## 2023-02-25 RX ADMIN — ALBUTEROL SULFATE 2.5 MG: 2.5 SOLUTION RESPIRATORY (INHALATION) at 14:29

## 2023-02-25 RX ADMIN — HEPARIN 300 UNITS: 100 SYRINGE at 20:34

## 2023-02-25 RX ADMIN — IPRATROPIUM BROMIDE 0.5 MG: 0.5 SOLUTION RESPIRATORY (INHALATION) at 04:55

## 2023-02-25 RX ADMIN — ALBUTEROL SULFATE 2.5 MG: 2.5 SOLUTION RESPIRATORY (INHALATION) at 04:56

## 2023-02-25 RX ADMIN — SODIUM CHLORIDE: 9 INJECTION, SOLUTION INTRAVENOUS at 13:00

## 2023-02-25 RX ADMIN — TRIAMCINOLONE ACETONIDE: 1 CREAM TOPICAL at 20:33

## 2023-02-25 RX ADMIN — ACETAMINOPHEN 650 MG: 325 TABLET ORAL at 17:56

## 2023-02-25 RX ADMIN — IPRATROPIUM BROMIDE 0.5 MG: 0.5 SOLUTION RESPIRATORY (INHALATION) at 14:29

## 2023-02-25 RX ADMIN — ANTI-FUNGAL POWDER MICONAZOLE NITRATE TALC FREE: 1.42 POWDER TOPICAL at 07:49

## 2023-02-25 ASSESSMENT — PAIN DESCRIPTION - ORIENTATION
ORIENTATION: RIGHT
ORIENTATION: RIGHT

## 2023-02-25 ASSESSMENT — PAIN SCALES - GENERAL
PAINLEVEL_OUTOF10: 4
PAINLEVEL_OUTOF10: 4

## 2023-02-25 ASSESSMENT — PAIN - FUNCTIONAL ASSESSMENT
PAIN_FUNCTIONAL_ASSESSMENT: PREVENTS OR INTERFERES WITH ALL ACTIVE AND SOME PASSIVE ACTIVITIES
PAIN_FUNCTIONAL_ASSESSMENT: PREVENTS OR INTERFERES WITH ALL ACTIVE AND SOME PASSIVE ACTIVITIES

## 2023-02-25 ASSESSMENT — PAIN DESCRIPTION - LOCATION
LOCATION: ANKLE
LOCATION: ANKLE

## 2023-02-25 ASSESSMENT — PAIN DESCRIPTION - DESCRIPTORS
DESCRIPTORS: DISCOMFORT
DESCRIPTORS: DISCOMFORT

## 2023-02-25 ASSESSMENT — PAIN DESCRIPTION - PAIN TYPE: TYPE: ACUTE PAIN

## 2023-02-25 NOTE — PLAN OF CARE
Problem: Discharge Planning  Goal: Discharge to home or other facility with appropriate resources  2/25/2023 1255 by Marilyn Villanueva RN  Outcome: Progressing  2/25/2023 0745 by Jose David Garcia RN  Outcome: Progressing     Problem: Pain  Goal: Verbalizes/displays adequate comfort level or baseline comfort level  2/25/2023 1255 by Marilyn Villanueva RN  Outcome: Progressing  2/25/2023 0745 by Jose David Garcia RN  Outcome: Progressing     Problem: Skin/Tissue Integrity  Goal: Absence of new skin breakdown  Description: 1. Monitor for areas of redness and/or skin breakdown  2. Assess vascular access sites hourly  3. Every 4-6 hours minimum:  Change oxygen saturation probe site  4. Every 4-6 hours:  If on nasal continuous positive airway pressure, respiratory therapy assess nares and determine need for appliance change or resting period. 2/25/2023 1255 by Marilyn Villanueva RN  Outcome: Progressing  2/25/2023 0745 by Jose David Garcia RN  Outcome: Progressing     Problem: Chronic Conditions and Co-morbidities  Goal: Patient's chronic conditions and co-morbidity symptoms are monitored and maintained or improved  Outcome: Progressing     Problem: Confusion  Goal: Confusion, delirium, dementia, or psychosis is improved or at baseline  Description: INTERVENTIONS:  1. Assess for possible contributors to thought disturbance, including medications, impaired vision or hearing, underlying metabolic abnormalities, dehydration, psychiatric diagnoses, and notify attending LIP  2. [place] high risk fall precautions, as indicated  3. Provide frequent short contacts to provide reality reorientation, refocusing and direction  4. Decrease environmental stimuli, including noise as appropriate  5. Monitor and intervene to maintain adequate nutrition, hydration, elimination, sleep and activity  6. If unable to ensure safety without constant attention obtain sitter and review sitter guidelines with assigned personnel  7.  Initiate Psychosocial CNS and Spiritual Care consult, as indicated  Outcome: Progressing     Problem: Nutrition Deficit:  Goal: Optimize nutritional status  2/25/2023 1255 by Fernando John RN  Outcome: Progressing  2/25/2023 0854 by Corrine Ham RD  Outcome: Progressing     Problem: Safety - Adult  Goal: Free from fall injury  Outcome: Progressing

## 2023-02-25 NOTE — PROGRESS NOTES
[address] [place], [place] [address]   ICU Progress Note    Admitting Date and Time: 2/24/2023  4:38 AM  Admit Dx: Elevated troponin [R77.8]  Closed fracture of right ankle, initial encounter [S82.403E]  Acute respiratory failure with hypoxia and hypercapnia (HCC) [J96.01, J96.02]  Acute hypercapnic respiratory failure (HCC) [J96.02]  Anemia, unspecified type [D64.9]  Pain of right hip joint [M25.551]    Subjective:    Patient was able to avoid intubation with Bipap. She wore most of yesterday and overnight. She states she is weak. She remembers trying to take dog out to bathroom and got tripped up with door opening and fell. Per RN: Levophed weaned down to 4 mcg/min. Currently on 10 L HFNC. Seen by wound nurse yesterday.      latanoprost  1 drop Both Eyes Nightly    triamcinolone   Topical BID    sodium chloride flush  5-40 mL IntraVENous 2 times per day    enoxaparin  30 mg SubCUTAneous BID    arformoterol tartrate  15 mcg Nebulization BID    albuterol  2.5 mg Nebulization Q4H WA    And    ipratropium  0.5 mg Nebulization Q4H WA    cefTRIAXone (ROCEPHIN) IV  1,000 mg IntraVENous Q24H    azithromycin  500 mg IntraVENous Q24H    atorvastatin  10 mg Oral Daily    pantoprazole (PROTONIX) 40 mg injection  40 mg IntraVENous Daily    miconazole   Topical BID    sodium chloride flush  5-40 mL IntraVENous 2 times per day    heparin flush  3 mL IntraVENous 2 times per day    hydrocortisone sodium succinate PF  100 mg IntraVENous Q8H     sodium chloride flush, 5-40 mL, PRN  sodium chloride, , PRN  ondansetron, 4 mg, Q8H PRN   Or  ondansetron, 4 mg, Q6H PRN  polyethylene glycol, 17 g, Daily PRN  acetaminophen, 650 mg, Q6H PRN   Or  acetaminophen, 650 mg, Q6H PRN  glucose, 4 tablet, PRN  dextrose bolus, 125 mL, PRN   Or  dextrose bolus, 250 mL, PRN  glucagon (rDNA), 1 mg, PRN  dextrose, , Continuous PRN  sodium chloride flush, 5-40 mL, PRN  heparin flush, 3 mL, PRN         Objective:    /68   Pulse 98   Temp 97.5 °F (36.4 °C) (Oral)   Resp 23   Ht 5' 6\" (1.676 m)   Wt 241 lb (109.3 kg)   SpO2 97%   BMI 38.90 kg/m²   General Appearance: alert and oriented to person, place and time and in no acute distress. Skin: multiple scabs off arms and legs as well as body. Intertrigo under right breath and right groin. Head: normocephalic and atraumatic  Eyes: pupils equal, round, and reactive to light, extraocular eye movements intact, conjunctivae normal  Neck: neck supple and non tender without mass   Pulmonary/Chest: clear to auscultation bilaterally- no wheezes, rales or rhonchi, normal air movement, no respiratory distress  Cardiovascular: normal rate, normal S1 and S2 and no carotid bruits  Abdomen: soft, obese, non-tender, non-distended, normal bowel sounds, no masses or organomegaly  Extremities:RLE in splint/  Neurologic: no cranial nerve deficit and speech normal      Recent Labs     02/24/23  0536 02/25/23  0403    135   K 3.6 3.6   CL 83* 91*   CO2 31* 34*   BUN 33* 37*   CREATININE 1.2* 1.0   GLUCOSE 213* 149*   CALCIUM 8.2* 7.6*       Recent Labs     02/25/23  0403   ALKPHOS 68   PROT 5.9*   LABALBU 2.7*   BILITOT 0.7   AST 16   ALT 7       Recent Labs     02/24/23  0536 02/25/23  0403   WBC 13.6* 16.7*   RBC 4.52 4.53   HGB 10.0* 9.7*   HCT 36.4 34.8   MCV 80.5 76.8*   MCH 22.1* 21.4*   MCHC 27.5* 27.9*   RDW 19.6* 19.1*    352   MPV 10.8 11.4      Latest Reference Range & Units 2/24/23 05:36 2/24/23 08:02   Pro-BNP 0 - 450 pg/mL 18,200 (H)    Troponin, High Sensitivity 0 - 9 ng/L 66 (H) 51 (H)   (H): Data is abnormally high     Latest Reference Range & Units 2/24/23 05:22 2/24/23 08:18 2/24/23 09:49 2/24/23 12:13 2/24/23 12:46   Source:   Blood Arterial Blood Arterial Blood Arterial Blood Arterial   pH, Blood Gas 7.350 - 7.450   7.168 (LL) 7.206 (LL) 7.193 (LL) 7. 456 (H)   PH (TEMPERATURE CORRECTED) 7.350 - 7.450  7. 182 (LL)       PCO2 35.0 - 45.0 mmHg  117.6 (HH) 98.4 (HH) 105.4 (HH) 52.2 (H)   PCO2 (TEMP CORRECTED) 35.0 - 45.0 mmHg 90.9 (HH)       pO2 75.0 - 100.0 mmHg  76.0 138.1 (H) 71.1 (L) 115.5 (H)   PO2 (TEMP CORRECTED) 60.0 - 80.0 mmHg 104.3 (H)       HCO3 22.0 - 26.0 mmol/L 34.1 (H) 41.7 (H) 38.1 (H) 39.6 (H) 36.0 (H)   Base Excess -3.0 - 3.0 mmol/L 2.4 9.2 (H) 7.0 (H) 8.3 (H) 10.5 (H)   O2 Sat 92.0 - 98.5 % 95.7 89.1 (L) 97.9 87.9 (L) 98.1   THB,THB 11.5 - 16.5 g/dL  12.2 12.1 11.4 (L) 11.3 (L)   O2Hb 94.0 - 97.0 %  88.0 (L) 96.6 87.0 (L) 97.3 (H)   Carboxy-Hgb 0.0 - 1.5 %  0.7 0.7 0.5 0.3   METHB,METHB 0.0 - 1.5 %  0.5 0.6 0.5 0.5   HHb 0.0 - 5.0 %  10.8 (H) 2.1 12.0 (H) 1.9   O2 Content mL/dL  15.2 16.7 14.0 15.6   AaDO2 mmHg  315.1 451. 5 368.6 380.1   RI(T)   4.15 3.27 5.18 3.29   FIO2 % 100.0 75.0 100.0 80.0 80.0   PO2/FIO2 mmHg/%  1.01 1.38 0.89 1.44   (LL): Data is critically low  (HH): Data is critically high  (H): Data is abnormally high  (L): Data is abnormally low    Radiology:   XR HIP RIGHT (2-3 VIEWS)   Final Result   No acute displaced fractures         XR TIBIA FIBULA RIGHT (2 VIEWS)   Final Result   Right fibular neck fracture and questionable lateral tibial plateau fracture   for which noncontrast CT could be considered. Fracture dislocation at the   ankle with restoration of ankle and Lucien E post reduction. Fractures at the   posterior and medial malleoli. XR KNEE RIGHT (1-2 VIEWS)   Final Result   Right fibular neck fracture and questionable lateral tibial plateau fracture   for which noncontrast CT could be considered. Fracture dislocation at the   ankle with restoration of ankle and Lucien E post reduction. Fractures at the   posterior and medial malleoli. XR ANKLE RIGHT (MIN 3 VIEWS)   Final Result   Right fibular neck fracture and questionable lateral tibial plateau fracture   for which noncontrast CT could be considered. Fracture dislocation at the   ankle with restoration of ankle and Lucien E post reduction. Fractures at the   posterior and medial malleoli. XR ANKLE RIGHT (MIN 3 VIEWS)   Final Result   Medial malleolar fracture with subluxation at the ankle manifest as anterior   widening of the ankle joint. Questionable fracture at the posterior   malleolus. See recommendations above. Heel spurs. XR CHEST 1 VIEW   Final Result   No active process allowing for rightward rotation. CT ANKLE RIGHT WO CONTRAST    (Results Pending)      TTE procedure:Echo Complete W/Doppler & Color Flow. Procedure Date  Date: 02/24/2023 Start: 10:24 AM     Study Location: Portable  Technical Quality: Poor visualization due to body habitus. Indications:Abnormal cardiac enzymes. Patient Status: Routine     Contrast Medium: Definity. Height: 66 inches Weight: 241 pounds BSA: 2.17 m^2 BMI: 38.9 kg/m^2     BP: 106/54 mmHg      Findings      Left Ventricle   Ejection fraction is visually estimated at 60%. No regional wall motion   abnormalities seen. Normal left ventricular wall thickness. Left ventricle   size is normal. Indeterminate diastolic function. Assessment:  Principal Problem:    Acute on chronic respiratory failure with hypoxia and hypercapnia ([facility])  Active Problems:    Elevated troponin    Iron deficiency anemia    KIMBERLY (acute kidney injury) ([facility] [address]    Primary hypertension    Pemphigus foliaceous  Resolved Problems:    * No resolved hospital problems. *      Plan:      Acute on chronic hypercapnic and hypoxic respiratory failure  -Pulmonary suggest suspects patient has baseline obesity hypoventilation syndrome.  -Intubation was able to be avoided with the use of BiPAP. Continued use of BiPAP at bedtime and as needed during daytime nap  -Currently requiring 10 L HFNC;  wean as tolerated    2. Acute hypotension  -septic shock?versus adrenal insufficiency as on prolong outpatient steroid therapy. Placed on stress dose steroids on admission with hydrocortisone 100 mg IV q8.  -continue ceftriaxone and azithromycin to cover for CAP.  Day #2 of treatment. -PICC line placed R basilic vein 3/06 as patient had poor access and needs pressors.  -currently on Levophed 4 mcg/min. 3. KIMBERLY  -creatinine mildly elevated on admission at 1.2 but down to 1.0 today. 4. Elevated troponin/elevate BNP  -Echo done 2/24 shows EF 60% with indeterminate diastolic function  -elevated troponin likely demand ischemia from acute respiratory distress on admission. 5. Pemiphigus foliaceous/severe candidal skin disease  -topical  triamcinolone 0.1% cream to rash ( but not skin folds)  -miconazole 2% nitrate powder bid. 6. Primary hypertension  -home meds     7. Right ankle fracture s/p fall  -reduced and splinted by orthopedics in ER yesterday. Case discussed with ICU RN on the unit. NOTE: This report was transcribed using voice recognition software. Every effort was made to ensure accuracy; however, inadvertent computerized transcription errors may be present.      Electronically signed by Negrita Fofana MD on 2/25/2023 at 5:02 PM

## 2023-02-25 NOTE — PLAN OF CARE
Problem: Discharge Planning  Goal: Discharge to home or other facility with appropriate resources  Outcome: Progressing     Problem: Pain  Goal: Verbalizes/displays adequate comfort level or baseline comfort level  Outcome: Progressing     Problem: Skin/Tissue Integrity  Goal: Absence of new skin breakdown  Description: 1. Monitor for areas of redness and/or skin breakdown  2. Assess vascular access sites hourly  3. Every 4-6 hours minimum:  Change oxygen saturation probe site  4. Every 4-6 hours:  If on nasal continuous positive airway pressure, respiratory therapy assess nares and determine need for appliance change or resting period. 2/25/2023 0745 by Michael Fajardo RN  Outcome: Progressing  2/24/2023 2026 by Leah Sen RN  Outcome: Not Progressing     Problem: Chronic Conditions and Co-morbidities  Goal: Patient's chronic conditions and co-morbidity symptoms are monitored and maintained or improved  2/24/2023 2026 by Leah Sen RN  Outcome: Progressing     Problem: Confusion  Goal: Confusion, delirium, dementia, or psychosis is improved or at baseline  Description: INTERVENTIONS:  1. Assess for possible contributors to thought disturbance, including medications, impaired vision or hearing, underlying metabolic abnormalities, dehydration, psychiatric diagnoses, and notify attending LIP  2. [place] high risk fall precautions, as indicated  3. Provide frequent short contacts to provide reality reorientation, refocusing and direction  4. Decrease environmental stimuli, including noise as appropriate  5. Monitor and intervene to maintain adequate nutrition, hydration, elimination, sleep and activity  6. If unable to ensure safety without constant attention obtain sitter and review sitter guidelines with assigned personnel  7.  Initiate Psychosocial CNS and Spiritual Care consult, as indicated  2/24/2023 2026 by Leah Sen RN  Outcome: Progressing     Problem: Skin/Tissue Integrity  Goal: Absence of new skin breakdown  Description: 1. Monitor for areas of redness and/or skin breakdown  2. Assess vascular access sites hourly  3. Every 4-6 hours minimum:  Change oxygen saturation probe site  4. Every 4-6 hours:  If on nasal continuous positive airway pressure, respiratory therapy assess nares and determine need for appliance change or resting period.   2/25/2023 0745 by Klaudia Aviles RN  Outcome: Progressing  2/24/2023 2026 by Marquis Cady RN  Outcome: Not Progressing

## 2023-02-25 NOTE — PLAN OF CARE
Problem: Chronic Conditions and Co-morbidities  Goal: Patient's chronic conditions and co-morbidity symptoms are monitored and maintained or improved  Outcome: Progressing     Problem: Confusion  Goal: Confusion, delirium, dementia, or psychosis is improved or at baseline  Description: INTERVENTIONS:  1. Assess for possible contributors to thought disturbance, including medications, impaired vision or hearing, underlying metabolic abnormalities, dehydration, psychiatric diagnoses, and notify attending LIP  2. [place] high risk fall precautions, as indicated  3. Provide frequent short contacts to provide reality reorientation, refocusing and direction  4. Decrease environmental stimuli, including noise as appropriate  5. Monitor and intervene to maintain adequate nutrition, hydration, elimination, sleep and activity  6. If unable to ensure safety without constant attention obtain sitter and review sitter guidelines with assigned personnel  7. Initiate Psychosocial CNS and Spiritual Care consult, as indicated  Outcome: Progressing     Problem: Safety - Medical Restraint  Goal: Remains free of injury from restraints (Restraint for Interference with Medical Device)  Description: INTERVENTIONS:  1. Determine that other, less restrictive measures have been tried or would not be effective before applying the restraint  2. Evaluate the patient's condition at the time of restraint application  3. Inform patient/family regarding the reason for restraint  4. Q2H: Monitor safety, psychosocial status, comfort, nutrition and hydration  Outcome: Completed     Problem: Skin/Tissue Integrity  Goal: Absence of new skin breakdown  Description: 1. Monitor for areas of redness and/or skin breakdown  2. Assess vascular access sites hourly  3. Every 4-6 hours minimum:  Change oxygen saturation probe site  4.   Every 4-6 hours:  If on nasal continuous positive airway pressure, respiratory therapy assess nares and determine need for appliance change or resting period.   Outcome: Not Progressing

## 2023-02-25 NOTE — PROGRESS NOTES
Physical Therapy  Physical Therapy Initial Evaluation/Plan of Care    Room #:  IC03/IC03-01  Patient Name: Laz Chiu  YOB: 1940  MRN: [id number]    Date of Service: 2/25/2023     Tentative placement recommendation:  [facility] vs [facility]  Equipment recommendation: To be determined      Evaluating Physical Therapist: Daniel Calvo PT, DPT #[id number]      Specific Provider Orders/Date/Referring Provider :     02/24/23 0700    PT evaluation and treat  Start:  02/24/23 0700,   End:  02/24/23 0700,   ONE TIME,   Standing Count:  1 Occurrences,   R         Dru Ignacio MD Acknowledge New     Admitting Diagnosis:   Elevated troponin [R77.8]  Closed fracture of right ankle, initial encounter [S82.891A]  Acute respiratory failure with hypoxia and hypercapnia (HCC) [J96.01, J96.02]  Acute hypercapnic respiratory failure (HCC) [J96.02]  Anemia, unspecified type [D64.9]  Pain of right hip joint [M25.551]      Surgery: none  Visit Diagnoses         Codes    Acute respiratory failure with hypoxia and hypercapnia (HCC)    -  Primary J96.01, J96.02    Closed fracture of right ankle, initial encounter     S82.891A    Anemia, unspecified type     D64.9    Pain of right hip joint     M25.551            Patient Active Problem List   Diagnosis    Acute on chronic respiratory failure with hypoxia and hypercapnia (HCC)    Elevated troponin    Iron deficiency anemia    KIMBERLY (acute kidney injury) ([facility] [address]    Primary hypertension    Pemphigus foliaceous        ASSESSMENT of Current Deficits Patient exhibits decreased strength, balance, and endurance impairing functional mobility, transfers, gait , gait distance, and tolerance to activity. Pt required MaxA for bed mobility but declined standing sitting EOB but was agreeable to sit EOB and perform seated exercises. Pt largely limited by R ankle fx (NWB through RLE) and hypotension during session (80/44 and 65/50 for BP).        PHYSICAL THERAPY  PLAN OF CARE       Physical therapy plan of care is established based on physician order,  patient diagnosis and clinical assessment    Current Treatment Recommendations:    -Bed Mobility: Lower extremity exercises  and Trunk control activities   -Sitting Balance: Incorporate reaching activities to activate trunk muscles , Hands on support to maintain midline , Facilitate active trunk muscle engagement , Facilitate postural control in all planes , and Engage in core activities to allow for movement within base of support   -Standing Balance: Perform strengthening exercises in standing to promote motor control with or without upper extremity support , Instruct patient on adequate base of support to maintain balance, and Challenge balance utilizing reaching  activities beyond center of gravity    -Transfers: Provide instruction on proper hand and foot position for adequate transfer of weight onto lower extremities and use of gait device if needed, Cues for hand placement, technique and safety.  Provide stabilization to prevent fall , Facilitate weight shift forward on to lower extremities and provide necessary stabilization of bilateral lower extremities , Support transfer of weight on to lower extremities, and Assist with extension of knees trunk and hip to accept weight transfer   -Gait: Gait training, Standing activities to improve: base of support, weight shift, weight bearing , Exercises to improve trunk control, Exercises to improve hip and knee control, Performance of protected weight bearing activities, and Activities to increase weight bearing   -Endurance: Utilize Supervised activities to increase level of endurance to allow for safe functional mobility including transfers and gait  and Use graduated activities to promote good breathing techniques and provide support and education to maximize respiratory function    PT long term treatment goals are located in below grid    Patient and or family understand(s) diagnosis, prognosis, and plan of care.    Frequency of treatments: Patient will be seen  daily. Prior Level of Function: Patient ambulated independently, with cane   Rehab Potential: fair + for baseline    Past medical history:   Past Medical History:   Diagnosis Date    Cancer [facility])     uterus  has hysterectomy    Hyperlipidemia     Hypertension      Past Surgical History:   Procedure Laterality Date    APPENDECTOMY      CARPAL TUNNEL RELEASE Right 12/05/2016    right trigger finger middle and ring    CHOLECYSTECTOMY      HYSTERECTOMY (CERVIX STATUS UNKNOWN)         SUBJECTIVE:    Precautions: Up with assistance, falls and AMS, NWB through RLE      Social history: Patient lives alone in a ranch home  with Ramp  to enter home. Walk in shower        Equipment owned: Della Meehan, Sim [address], and Bedside commode    AM-PAC Basic Mobility       AM-PAC Basic Mobility - Inpatient   How much help is needed turning from your back to your side while in a flat bed without using bedrails?: A Lot  How much help is needed moving from lying on your back to sitting on the side of a flat bed without using bedrails?: A Lot  How much help is needed moving to and from a bed to a chair?: Total  How much help is needed standing up from a chair using your arms?: Total  How much help is needed walking in hospital room?: Total  How much help is needed climbing 3-5 steps with a railing?: Total  AM-PAC Inpatient Mobility Raw Score : 8  AM-PAC Inpatient T-Scale Score : 28.52  Mobility Inpatient CMS 0-100% Score: 86.62  Mobility Inpatient CMS G-Code Modifier : CM    Nursing cleared patient for PT evaluation. The admitting diagnosis and active problem list as listed above have been reviewed prior to the initiation of this evaluation. OBJECTIVE;   Initial Evaluation  Date: 2/25/2023 Treatment Date:     Short Term/ Long Term   Goals   Was pt agreeable to Eval/treatment?  Yes  To be met in 3 days   Pain level   0/10       Bed Mobility    Rolling: Maximal assist of 1    Supine to sit: Maximal assist of 1    Sit to supine: Maximal assist of 1    Scooting: Maximal assist of 1    Rolling: Minimal assist of 1    Supine to sit: Minimal assist of 1    Sit to supine: Minimal assist of 1    Scooting: Minimal assist of 1     Transfers Sit to stand: Not assessed  pt declined  Sit to stand: Minimal assist of 1    Ambulation    not assessed     > 15  feet using  wheeled walker with Moderate assist of 1    ROM Within functional limits    Increase range of motion 10% of affected joints    Strength BUE:  refer to OT eval  RLE:  3-/5  LLE:  3-/5  Increase strength in affected mm groups by 1/3 grade   Balance Sitting EOB:  fair   Dynamic Standing:  not assessed   Sitting EOB:  fair +  Dynamic Standing: fair with [address]     Patient is Alert & Oriented x person, place, time, and situation and follows directions    Sensation:  Patient  denies numbness/tingling   Edema:  no   Endurance: poor +    Vitals:  15 liters high flow nasal cannula   Blood Pressure at rest  Blood Pressure during session  80/44 and 65/50   Heart Rate at rest  Heart Rate during session    SPO2 at rest %  SPO2 during session 89-98%     Patient education  Patient educated on role of Physical Therapy, risks of immobility, safety and plan of care, energy conservation,  importance of mobility while in hospital , purse lip breathing, importance and purpose of adaptive device and adjusted to proper height for the patient. , safety , and weight bearing status      Patient response to education:   Pt verbalized understanding Pt demonstrated skill Pt requires further education in this area   Yes Partial Yes      Treatment:  Patient practiced and was instructed/facilitated in the following treatment: Patient Sat edge of bed 20 minutes with SBA/Kvng to increase dynamic sitting balance and activity tolerance. Pt performed bed mobility, sat EOB and performed seated exercises, scooted toward HOB.       Therapeutic Exercises:  ankle pumps, heel raises, hip abduction/adduction, long arc quad, and seated marching  x 10 reps x 2 sets. At end of session, patient in bed with alarm call light and phone within reach,  all lines and tubes intact, nursing notified. Patient would benefit from continued skilled Physical Therapy to improve functional independence and quality of life. Patient's/ family goals   rehab    Time in  914  Time out  958    Total Treatment Time  24 minutes    Evaluation time includes thorough review of current medical information, gathering information on past medical history/social history and prior level of function, completion of standardized testing/informal observation of tasks, assessment of data, and development of Plan of care and goals.      CPT codes:  Low Complexity PT evaluation (12224)  Therapeutic activities (82603)   9 minutes  1 unit(s)  Therapeutic exercises (54235)   15 minutes  1 unit(s)    Kate Manjarrez, PT

## 2023-02-25 NOTE — PROGRESS NOTES
Comprehensive Nutrition Assessment    Type and Reason for Visit:  Initial, Positive Nutrition Screen, Wound    Nutrition Recommendations/Plan:   Continue current diet   Ordered Kendell BID to aid in wound healing & Ensure HP to optimize nutrition status. Continue to monitor     Malnutrition Assessment:  Malnutrition Status: At risk for malnutrition (Comment) (02/25/23 7374)    Context:  Acute Illness     Findings of the 6 clinical characteristics of malnutrition:  Energy Intake:  No significant decrease in energy intake  Weight Loss:  Unable to assess (d/t lack of wt hx in EMR)     Body Fat Loss:  No significant body fat loss     Muscle Mass Loss:  No significant muscle mass loss    Fluid Accumulation:  No significant fluid accumulation     Strength:  Not Performed    Nutrition Assessment:    Pt admits s/p fall,  Dx; Acute on chronic respiratory failure w/ hypoxia & hypercapnia & R Ankle fracture, note Pemphigus foliaceous & 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 Hx: HTN, Uterine cancer s/p hysterectomy. Pt meal intake is >50%. Pt has a wound. Will order Kendell BID to aid in wound healing & Ensure HP to optimize nutrition status. Continue to monitor    Nutrition Related Findings:    A/O x4 oriented/disoriented @ times, rotund/rounded abd +BS, I/Os+1.1, no edema noted. Wound Type: Pressure Injury, Multiple, Stage II, Wound Consult Pending, Skin Tears (buttock, rt dorsal)       Current Nutrition Intake & Therapies:    Average Meal Intake: 51-75%  Average Supplements Intake: None Ordered  ADULT DIET; Regular; Low Fat/Low Chol/High Fiber/2 gm Na  ADULT ORAL NUTRITION SUPPLEMENT; Breakfast, Dinner; Wound Healing Oral Supplement  ADULT ORAL NUTRITION SUPPLEMENT; Lunch, Dinner;  Low Calorie/High Protein Oral Supplement    Anthropometric Measures:  Height: 5' 6\" (167.6 cm)  Ideal Body Weight (IBW): 130 lbs (59 kg)    Admission Body Weight: 241 lb (109.3 kg) (stated 2/24)  Current Body Weight: 241 lb (109.3 kg) (stated 2/24), 185.4 % IBW. Current BMI (kg/m2): 38.9  Usual Body Weight:  (no wt hx in EMR)     Weight Adjustment For: No Adjustment     BMI Categories: Obese Class 2 (BMI 35.0 -39.9)    Estimated Daily Nutrient Needs:  Energy Requirements Based On: Formula  Weight Used for Energy Requirements: Current  Energy (kcal/day): 4116-9586  Weight Used for Protein Requirements: Ideal  Protein (g/day): 100-110 (1.7-1.9 gm/kg)  Method Used for Fluid Requirements: 1 ml/kcal  Fluid (ml/day): 9201-5342    Nutrition Diagnosis:   Increased nutrient needs related to increase demand for energy/nutrients as evidenced by wounds    Nutrition Interventions:   Food and/or Nutrient Delivery: Continue Current Diet, Start Oral Nutrition Supplement  Nutrition Education/Counseling: No recommendation at this time  Coordination of Nutrition Care: Continue to monitor while inpatient     Goals:     Goals: PO intake 75% or greater     Nutrition Monitoring and Evaluation:   Behavioral-Environmental Outcomes: None Identified  Food/Nutrient Intake Outcomes: Food and Nutrient Intake, Supplement Intake  Physical Signs/Symptoms Outcomes: Biochemical Data, Chewing or Swallowing, GI Status, Fluid Status or Edema, Weight, Skin, Nutrition Focused Physical Findings    Discharge Planning:     Too soon to determine     Forrest Trujillo RD  Contact: [phone]

## 2023-02-25 NOTE — PROGRESS NOTES
Pulmonary/Critical Care Progress Note    SUMMARY:  Delia Ochoa is a 79 y.o. female  with a history of HTN, HLD, uterine cancer s/p hysterectomy presents with fall. Developed altered mental status and Acute on Chronic Hypercapneic Respiratory Failure in ER and transferred to ICU    ISSUES:    Acute on chronic respiratory failure with hypoxia and hypercapnia (HCC)  Suspect baseline Obesity Hypoventilation Syndrome   Very borderline / close to intubation when presented to ICU  Much better  OK to come off BiPAP  Needs to use qhs and PRN  Certainly at high risk for recurrence    Steady improvement  Will need outpt sleep eval    Hypotension  Fluids  Levophed - wean as tolerated  PICC line  Change BP cuff position  On Azithro / Ceftriaxone  Solucortef Stress dose (hx of steroid use)  Panculture      KIMBERLY (acute kidney injury) ([address]  Not sure about recent baseline  Improving   Follow    Fall  R Ankle fracture  Splinted - seen by ortho  CT ordered    Elevated troponin  Very elevated BNP  Echo done - ?  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)  Wound care team guidance appreciated   Mycostatin      Goals of care 2/24/23  With pt / daughter / grandtr   Pt indicates would agree to intubation if needed    2/25/2023 Updated pt / family at bedside   All questions answered       ______________________________________________    SUBJECTIVE:  Alert    OBJECTIVE:  Much improved   Off BiPAP  Hypotensive on Levo  Acute Kidney Injury improving     MEDICATIONS:   potassium phosphate IVPB  20 mmol IntraVENous Once    sodium chloride flush  5-40 mL IntraVENous 2 times per day    enoxaparin  30 mg SubCUTAneous BID    arformoterol tartrate  15 mcg Nebulization BID    albuterol  2.5 mg Nebulization Q4H WA    And    ipratropium  0.5 mg Nebulization Q4H WA    cefTRIAXone (ROCEPHIN) IV  1,000 mg IntraVENous Q24H    azithromycin  500 mg IntraVENous Q24H    atorvastatin  10 mg Oral Daily    pantoprazole (PROTONIX) 40 mg injection  40 mg IntraVENous Daily    lidocaine  10 mL Intra-artICUlar See Admin Instructions    miconazole   Topical BID    sodium chloride flush  5-40 mL IntraVENous 2 times per day    heparin flush  3 mL IntraVENous 2 times per day    hydrocortisone sodium succinate PF  100 mg IntraVENous Q8H      sodium chloride      dextrose      norepinephrine 6 mcg/min (02/25/23 0423)    fentaNYL 10 mcg/ml in 0.9%  ml infusion Stopped (02/24/23 1315)    dexmedetomidine HCl in NaCl 0.2 mcg/kg/hr (02/25/23 0050)    sodium chloride 75 mL/hr at 02/25/23 0016    sodium chloride       sodium chloride flush, sodium chloride, ondansetron **OR** ondansetron, polyethylene glycol, acetaminophen **OR** acetaminophen, glucose, dextrose bolus **OR** dextrose bolus, glucagon (rDNA), dextrose, sodium chloride flush, sodium chloride, heparin flush      REVIEW OF SYSTEMS:  Unable to provide, intubated    Vitals:    02/25/23 0600   BP: (!) 104/57   Pulse: 88   Resp: 20   Temp:    SpO2: 99%     FiO2 : 60 %  O2 Flow Rate (L/min): 15 L/min  O2 Device: PAP (positive airway pressure)    PHYSICAL EXAM:  General Appearance: alert, NAD  Cardiovascular: normal rate, regular rhythm, normal S1 and S2, no murmurs, rubs, clicks, or gallops, distal pulses intact,   Pulmonary/Chest: clear to auscultation bilaterally- no wheezes, rales or rhonchi, normal air movement, no respiratory distress  Abdomen: morbidly obese, soft, non-tender, non-distended, normal bowel sounds  Extremities: positive edema, R ankle splint  Skin: diffuse crusted lesions, ++ macerated skin in skin folds, foul smelling   Eyes: pupils equal, round, and reactive to light  Musculoskeletal / Neurological: grossly normal  no clear focal finding, moving 4 limbs         Additional Respiratory Assessments  Heart Rate: 88  Resp: 20  SpO2: 99 %     URINARY CATHETER OUTPUT ([place]):    Urinary Catheter 02/24/23-Output (mL): 100 mL    LABS:    WBC   Date Value Ref Range Status   02/25/2023 16.7 (H) 4.5 - 11.5 E9/L Final   02/24/2023 13.6 (H) 4.5 - 11.5 E9/L Final   04/22/2021 10.0 4.5 - 11.5 E9/L Final     Hemoglobin   Date Value Ref Range Status   02/25/2023 9.7 (L) 11.5 - 15.5 g/dL Final   02/24/2023 10.0 (L) 11.5 - 15.5 g/dL Final   04/22/2021 14.7 11.5 - 15.5 g/dL Final     Hematocrit   Date Value Ref Range Status   02/25/2023 34.8 34.0 - 48.0 % Final   02/24/2023 36.4 34.0 - 48.0 % Final   04/22/2021 47.1 34.0 - 48.0 % Final     MCV   Date Value Ref Range Status   02/25/2023 76.8 (L) 80.0 - 99.9 fL Final   02/24/2023 80.5 80.0 - 99.9 fL Final   04/22/2021 92.5 80.0 - 99.9 fL Final     Platelets   Date Value Ref Range Status   02/25/2023 352 130 - 450 E9/L Final   02/24/2023 400 130 - 450 E9/L Final   04/22/2021 346 130 - 450 E9/L Final     Sodium   Date Value Ref Range Status   02/25/2023 135 132 - 146 mmol/L Final   02/24/2023 135 132 - 146 mmol/L Final   04/22/2021 140 132 - 146 mmol/L Final     Potassium   Date Value Ref Range Status   02/24/2023 3.6 3.5 - 5.0 mmol/L Final   04/22/2021 4.7 3.5 - 5.0 mmol/L Final   01/14/2021 4.3 3.5 - 5.0 mmol/L Final     Potassium reflex Magnesium   Date Value Ref Range Status   02/25/2023 3.6 3.5 - 5.0 mmol/L Final     Chloride   Date Value Ref Range Status   02/25/2023 91 (L) 98 - 107 mmol/L Final   02/24/2023 83 (L) 98 - 107 mmol/L Final   04/22/2021 98 98 - 107 mmol/L Final     CO2   Date Value Ref Range Status   02/25/2023 34 (H) 22 - 29 mmol/L Final   02/24/2023 31 (H) 22 - 29 mmol/L Final   04/22/2021 30 (H) 22 - 29 mmol/L Final     BUN   Date Value Ref Range Status   02/25/2023 37 (H) 6 - 23 mg/dL Final   02/24/2023 33 (H) 6 - 23 mg/dL Final   04/22/2021 14 6 - 23 mg/dL Final     Creatinine   Date Value Ref Range Status   02/25/2023 1.0 0.5 - 1.0 mg/dL Final   02/24/2023 1.2 (H) 0.5 - 1.0 mg/dL Final   04/22/2021 0.6 0.5 - 1.0 mg/dL Final     Glucose   Date Value Ref Range Status   02/25/2023 149 (H) 74 - 99 mg/dL Final   02/24/2023 213 (H) 74 - 99 mg/dL Final   04/22/2021 107 (H) 74 - 99 mg/dL Final   03/02/2012 92 70 - 110 mg/dL Final     Calcium   Date Value Ref Range Status   02/25/2023 7.6 (L) 8.6 - 10.2 mg/dL Final   02/24/2023 8.2 (L) 8.6 - 10.2 mg/dL Final   04/22/2021 10.2 8.6 - 10.2 mg/dL Final     Total Protein   Date Value Ref Range Status   02/25/2023 5.9 (L) 6.4 - 8.3 g/dL Final   04/22/2021 8.3 6.4 - 8.3 g/dL Final   01/14/2021 8.2 6.4 - 8.3 g/dL Final     Albumin   Date Value Ref Range Status   02/25/2023 2.7 (L) 3.5 - 5.2 g/dL Final   04/22/2021 4.2 3.5 - 5.2 g/dL Final   01/14/2021 4.3 3.5 - 5.2 g/dL Final   03/02/2012 4.3 3.2 - 4.8 g/dL Final     Total Bilirubin   Date Value Ref Range Status   02/25/2023 0.7 0.0 - 1.2 mg/dL Final   04/22/2021 0.5 0.0 - 1.2 mg/dL Final   01/14/2021 0.4 0.0 - 1.2 mg/dL Final     Alkaline Phosphatase   Date Value Ref Range Status   02/25/2023 68 35 - 104 U/L Final   04/22/2021 78 35 - 104 U/L Final   01/14/2021 72 35 - 104 U/L Final     AST   Date Value Ref Range Status   02/25/2023 16 0 - 31 U/L Final   04/22/2021 30 0 - 31 U/L Final   01/14/2021 35 (H) 0 - 31 U/L Final     ALT   Date Value Ref Range Status   02/25/2023 7 0 - 32 U/L Final   04/22/2021 12 0 - 32 U/L Final   01/14/2021 12 0 - 32 U/L Final     Est, Glom Filt Rate   Date Value Ref Range Status   02/25/2023 56 >=60 mL/min/1.73 Final     Comment:     Pediatric calculator link  [email or web address]_[email or web address]  Effective Oct 3, 2022  These results are not intended for use in patients  <25years of age. eGFR results are calculated without  a race factor using the 2021 CKD-EPI equation. Careful  clinical correlation is recommended, particularly when  comparing to results calculated using previous equations.   The CKD-EPI equation is less accurate in patients with  extremes of muscle mass, extra-renal metabolism of  creatinine, excessive creatinine ingestion, or following  therapy that affects renal tubular secretion. 02/24/2023 45 >=60 mL/min/1.73 Final     Comment:     Pediatric calculator link  [email or web address] [email or web address]/gfr_[email or web address]  Effective Oct 3, 2022  These results are not intended for use in patients  <25years of age. eGFR results are calculated without  a race factor using the 2021 CKD-EPI equation. Careful  clinical correlation is recommended, particularly when  comparing to results calculated using previous equations. The CKD-EPI equation is less accurate in patients with  extremes of muscle mass, extra-renal metabolism of  creatinine, excessive creatinine ingestion, or following  therapy that affects renal tubular secretion. GFR Non-   Date Value Ref Range Status   04/22/2021 >60 >=60 mL/min/1.73 Final     Comment:     Chronic Kidney Disease: less than 60 ml/min/1.73 sq.m. Kidney Failure: less than 15 ml/min/1.73 sq.m. Results valid for patients 18 years and older. 01/14/2021 >60 >=60 mL/min/1.73 Final     Comment:     Chronic Kidney Disease: less than 60 ml/min/1.73 sq.m. Kidney Failure: less than 15 ml/min/1.73 sq.m. Results valid for patients 18 years and older. 09/03/2020 >60 >=60 mL/min/1.73 Final     Comment:     Chronic Kidney Disease: less than 60 ml/min/1.73 sq.m. Kidney Failure: less than 15 ml/min/1.73 sq.m. Results valid for patients 18 years and older.        GFR    Date Value Ref Range Status   04/22/2021 >60  Final   01/14/2021 >60  Final   09/03/2020 >60  Final     No results found for: MG  Phosphorus   Date Value Ref Range Status   02/25/2023 2.0 (L) 2.5 - 4.5 mg/dL Final     Recent Labs     02/24/23  1246 02/25/23  0507   PH 7.456*  --    PO2 115.5*  --    PCO2 52.2*  --    HCO3 36.0* 32.3*   BE 10.5* 8.6*   O2SAT 98.1 96.7       No results for input(s): CULTRESP in the last 72 hours. Lab Results   Component Value Date/Time    PH 7.456 02/24/2023 12:46 PM    PH 7.193 02/24/2023 12:13 PM    PH 7.206 02/24/2023 09:49 AM    PH 7.168 02/24/2023 08:18 AM    PO2 115.5 02/24/2023 12:46 PM    PO2 71.1 02/24/2023 12:13 PM    PO2 138.1 02/24/2023 09:49 AM    PO2 76.0 02/24/2023 08:18 AM    PCO2 52.2 02/24/2023 12:46 PM    PCO2 105.4 02/24/2023 12:13 PM    PCO2 98.4 02/24/2023 09:49 AM    PCO2 117.6 02/24/2023 08:18 AM    HCO3 32.3 02/25/2023 05:07 AM    HCO3 36.0 02/24/2023 12:46 PM    HCO3 39.6 02/24/2023 12:13 PM    HCO3 38.1 02/24/2023 09:49 AM    O2SAT 96.7 02/25/2023 05:07 AM    O2SAT 98.1 02/24/2023 12:46 PM    O2SAT 87.9 02/24/2023 12:13 PM    O2SAT 97.9 02/24/2023 09:49 AM       RADIOLOGY:  XR HIP RIGHT (2-3 VIEWS)   Final Result   No acute displaced fractures         XR TIBIA FIBULA RIGHT (2 VIEWS)   Final Result   Right fibular neck fracture and questionable lateral tibial plateau fracture   for which noncontrast CT could be considered. Fracture dislocation at the   ankle with restoration of ankle and Lucien E post reduction. Fractures at the   posterior and medial malleoli. XR KNEE RIGHT (1-2 VIEWS)   Final Result   Right fibular neck fracture and questionable lateral tibial plateau fracture   for which noncontrast CT could be considered. Fracture dislocation at the   ankle with restoration of ankle and Lucien E post reduction. Fractures at the   posterior and medial malleoli. XR ANKLE RIGHT (MIN 3 VIEWS)   Final Result   Right fibular neck fracture and questionable lateral tibial plateau fracture   for which noncontrast CT could be considered. Fracture dislocation at the   ankle with restoration of ankle and Lucien E post reduction. Fractures at the   posterior and medial malleoli.          XR ANKLE RIGHT (MIN 3 VIEWS)   Final Result   Medial malleolar fracture with subluxation at the ankle manifest as anterior   widening of the ankle joint. Questionable fracture at the posterior   malleolus. See recommendations above. Heel spurs. XR CHEST 1 VIEW   Final Result   No active process allowing for rightward rotation.          CT ANKLE RIGHT WO CONTRAST    (Results Pending)     (Independently reviewed by me)      CRITICAL CARE TIME:  40 minutes    Ivon Jones M.D  Pulmonary & Critical Care  2/25/2023 7:42 AM

## 2023-02-26 LAB
ACANTHOCYTES: ABNORMAL
ALBUMIN SERPL-MCNC: 2.7 G/DL (ref 3.5–5.2)
ALP BLD-CCNC: 63 U/L (ref 35–104)
ALT SERPL-CCNC: 8 U/L (ref 0–32)
ANION GAP SERPL CALCULATED.3IONS-SCNC: 4 MMOL/L (ref 7–16)
ANISOCYTOSIS: ABNORMAL
AST SERPL-CCNC: 16 U/L (ref 0–31)
BASOPHILS ABSOLUTE: 0 E9/L (ref 0–0.2)
BASOPHILS RELATIVE PERCENT: 0.1 % (ref 0–2)
BILIRUB SERPL-MCNC: 0.4 MG/DL (ref 0–1.2)
BUN BLDV-MCNC: 37 MG/DL (ref 6–23)
BURR CELLS: ABNORMAL
CALCIUM SERPL-MCNC: 7.5 MG/DL (ref 8.6–10.2)
CHLORIDE BLD-SCNC: 93 MMOL/L (ref 98–107)
CO2: 37 MMOL/L (ref 22–29)
CREAT SERPL-MCNC: 0.9 MG/DL (ref 0.5–1)
EOSINOPHILS ABSOLUTE: 0 E9/L (ref 0.05–0.5)
EOSINOPHILS RELATIVE PERCENT: 0 % (ref 0–6)
GFR SERPL CREATININE-BSD FRML MDRD: >60 ML/MIN/1.73
GLUCOSE BLD-MCNC: 188 MG/DL (ref 74–99)
HCT VFR BLD CALC: 31.7 % (ref 34–48)
HEMOGLOBIN: 8.9 G/DL (ref 11.5–15.5)
HYPOCHROMIA: ABNORMAL
LYMPHOCYTES ABSOLUTE: 0.85 E9/L (ref 1.5–4)
LYMPHOCYTES RELATIVE PERCENT: 5.2 % (ref 20–42)
MCH RBC QN AUTO: 22.2 PG (ref 26–35)
MCHC RBC AUTO-ENTMCNC: 28.1 % (ref 32–34.5)
MCV RBC AUTO: 79.1 FL (ref 80–99.9)
MONOCYTES ABSOLUTE: 0 E9/L (ref 0.1–0.95)
MONOCYTES RELATIVE PERCENT: 5.7 % (ref 2–12)
NEUTROPHILS ABSOLUTE: 16.06 E9/L (ref 1.8–7.3)
NEUTROPHILS RELATIVE PERCENT: 94.8 % (ref 43–80)
OVALOCYTES: ABNORMAL
PDW BLD-RTO: 18.8 FL (ref 11.5–15)
PHOSPHORUS: 3.3 MG/DL (ref 2.5–4.5)
PLATELET # BLD: 297 E9/L (ref 130–450)
PMV BLD AUTO: 11.2 FL (ref 7–12)
POIKILOCYTES: ABNORMAL
POLYCHROMASIA: ABNORMAL
POTASSIUM REFLEX MAGNESIUM: 4.7 MMOL/L (ref 3.5–5)
RBC # BLD: 4.01 E12/L (ref 3.5–5.5)
SODIUM BLD-SCNC: 134 MMOL/L (ref 132–146)
STOMATOCYTES: ABNORMAL
TARGET CELLS: ABNORMAL
TOTAL PROTEIN: 5.8 G/DL (ref 6.4–8.3)
WBC # BLD: 16.9 E9/L (ref 4.5–11.5)

## 2023-02-26 PROCEDURE — 6360000002 HC RX W HCPCS: Performed by: FAMILY MEDICINE

## 2023-02-26 PROCEDURE — 2580000003 HC RX 258: Performed by: INTERNAL MEDICINE

## 2023-02-26 PROCEDURE — 80053 COMPREHEN METABOLIC PANEL: CPT

## 2023-02-26 PROCEDURE — C9113 INJ PANTOPRAZOLE SODIUM, VIA: HCPCS | Performed by: FAMILY MEDICINE

## 2023-02-26 PROCEDURE — 6360000002 HC RX W HCPCS: Performed by: INTERNAL MEDICINE

## 2023-02-26 PROCEDURE — 94660 CPAP INITIATION&MGMT: CPT

## 2023-02-26 PROCEDURE — 85025 COMPLETE CBC W/AUTO DIFF WBC: CPT

## 2023-02-26 PROCEDURE — 94640 AIRWAY INHALATION TREATMENT: CPT

## 2023-02-26 PROCEDURE — A4216 STERILE WATER/SALINE, 10 ML: HCPCS | Performed by: FAMILY MEDICINE

## 2023-02-26 PROCEDURE — 99233 SBSQ HOSP IP/OBS HIGH 50: CPT | Performed by: INTERNAL MEDICINE

## 2023-02-26 PROCEDURE — 84100 ASSAY OF PHOSPHORUS: CPT

## 2023-02-26 PROCEDURE — 6370000000 HC RX 637 (ALT 250 FOR IP): Performed by: FAMILY MEDICINE

## 2023-02-26 PROCEDURE — 2700000000 HC OXYGEN THERAPY PER DAY

## 2023-02-26 PROCEDURE — 2580000003 HC RX 258: Performed by: FAMILY MEDICINE

## 2023-02-26 PROCEDURE — 2060000000 HC ICU INTERMEDIATE R&B

## 2023-02-26 PROCEDURE — 6370000000 HC RX 637 (ALT 250 FOR IP)

## 2023-02-26 RX ORDER — MECOBALAMIN 5000 MCG
5 TABLET,DISINTEGRATING ORAL ONCE
Status: COMPLETED | OUTPATIENT
Start: 2023-02-26 | End: 2023-02-26

## 2023-02-26 RX ORDER — AZITHROMYCIN 250 MG/1
500 TABLET, FILM COATED ORAL DAILY
Status: COMPLETED | OUTPATIENT
Start: 2023-02-27 | End: 2023-03-04

## 2023-02-26 RX ORDER — PANTOPRAZOLE SODIUM 40 MG/1
40 TABLET, DELAYED RELEASE ORAL
Status: DISCONTINUED | OUTPATIENT
Start: 2023-02-27 | End: 2023-03-06 | Stop reason: HOSPADM

## 2023-02-26 RX ADMIN — SODIUM CHLORIDE: 9 INJECTION, SOLUTION INTRAVENOUS at 01:42

## 2023-02-26 RX ADMIN — Medication 10 ML: at 07:36

## 2023-02-26 RX ADMIN — ALBUTEROL SULFATE 2.5 MG: 2.5 SOLUTION RESPIRATORY (INHALATION) at 17:01

## 2023-02-26 RX ADMIN — HEPARIN 300 UNITS: 100 SYRINGE at 07:36

## 2023-02-26 RX ADMIN — HEPARIN 300 UNITS: 100 SYRINGE at 20:46

## 2023-02-26 RX ADMIN — IPRATROPIUM BROMIDE 0.5 MG: 0.5 SOLUTION RESPIRATORY (INHALATION) at 17:01

## 2023-02-26 RX ADMIN — HYDROCORTISONE SODIUM SUCCINATE 100 MG: 100 INJECTION, POWDER, FOR SOLUTION INTRAMUSCULAR; INTRAVENOUS at 10:42

## 2023-02-26 RX ADMIN — ALBUTEROL SULFATE 2.5 MG: 2.5 SOLUTION RESPIRATORY (INHALATION) at 05:12

## 2023-02-26 RX ADMIN — HEPARIN 300 UNITS: 100 SYRINGE at 20:45

## 2023-02-26 RX ADMIN — WATER 1000 MG: 1 INJECTION INTRAMUSCULAR; INTRAVENOUS; SUBCUTANEOUS at 06:50

## 2023-02-26 RX ADMIN — SODIUM CHLORIDE 40 MG: 9 INJECTION, SOLUTION INTRAMUSCULAR; INTRAVENOUS; SUBCUTANEOUS at 07:38

## 2023-02-26 RX ADMIN — ALBUTEROL SULFATE 2.5 MG: 2.5 SOLUTION RESPIRATORY (INHALATION) at 08:54

## 2023-02-26 RX ADMIN — TRIAMCINOLONE ACETONIDE: 1 CREAM TOPICAL at 07:37

## 2023-02-26 RX ADMIN — HYDROCODONE BITARTRATE AND ACETAMINOPHEN 1 TABLET: 5; 325 TABLET ORAL at 08:52

## 2023-02-26 RX ADMIN — ARFORMOTEROL TARTRATE 15 MCG: 15 SOLUTION RESPIRATORY (INHALATION) at 05:12

## 2023-02-26 RX ADMIN — LATANOPROST 1 DROP: 50 SOLUTION OPHTHALMIC at 20:39

## 2023-02-26 RX ADMIN — IPRATROPIUM BROMIDE 0.5 MG: 0.5 SOLUTION RESPIRATORY (INHALATION) at 13:17

## 2023-02-26 RX ADMIN — ENOXAPARIN SODIUM 30 MG: 100 INJECTION SUBCUTANEOUS at 20:46

## 2023-02-26 RX ADMIN — HYDROCORTISONE SODIUM SUCCINATE 50 MG: 100 INJECTION, POWDER, FOR SOLUTION INTRAMUSCULAR; INTRAVENOUS at 18:09

## 2023-02-26 RX ADMIN — AZITHROMYCIN DIHYDRATE 500 MG: 500 INJECTION, POWDER, LYOPHILIZED, FOR SOLUTION INTRAVENOUS at 06:48

## 2023-02-26 RX ADMIN — ONDANSETRON 4 MG: 2 INJECTION INTRAMUSCULAR; INTRAVENOUS at 21:50

## 2023-02-26 RX ADMIN — IPRATROPIUM BROMIDE 0.5 MG: 0.5 SOLUTION RESPIRATORY (INHALATION) at 05:12

## 2023-02-26 RX ADMIN — ATORVASTATIN CALCIUM 10 MG: 10 TABLET, FILM COATED ORAL at 07:36

## 2023-02-26 RX ADMIN — Medication 5 MG: at 00:26

## 2023-02-26 RX ADMIN — HYDROCODONE BITARTRATE AND ACETAMINOPHEN 1 TABLET: 5; 325 TABLET ORAL at 18:55

## 2023-02-26 RX ADMIN — SODIUM CHLORIDE, PRESERVATIVE FREE 10 ML: 5 INJECTION INTRAVENOUS at 21:50

## 2023-02-26 RX ADMIN — IPRATROPIUM BROMIDE 0.5 MG: 0.5 SOLUTION RESPIRATORY (INHALATION) at 08:55

## 2023-02-26 RX ADMIN — ANTI-FUNGAL POWDER MICONAZOLE NITRATE TALC FREE: 1.42 POWDER TOPICAL at 07:38

## 2023-02-26 RX ADMIN — ENOXAPARIN SODIUM 30 MG: 100 INJECTION SUBCUTANEOUS at 07:36

## 2023-02-26 RX ADMIN — ALBUTEROL SULFATE 2.5 MG: 2.5 SOLUTION RESPIRATORY (INHALATION) at 13:17

## 2023-02-26 RX ADMIN — HYDROCORTISONE SODIUM SUCCINATE 100 MG: 100 INJECTION, POWDER, FOR SOLUTION INTRAMUSCULAR; INTRAVENOUS at 04:31

## 2023-02-26 RX ADMIN — TRIAMCINOLONE ACETONIDE: 1 CREAM TOPICAL at 20:39

## 2023-02-26 RX ADMIN — ARFORMOTEROL TARTRATE 15 MCG: 15 SOLUTION RESPIRATORY (INHALATION) at 17:02

## 2023-02-26 RX ADMIN — ANTI-FUNGAL POWDER MICONAZOLE NITRATE TALC FREE: 1.42 POWDER TOPICAL at 20:38

## 2023-02-26 ASSESSMENT — PAIN SCALES - GENERAL
PAINLEVEL_OUTOF10: 6
PAINLEVEL_OUTOF10: 0
PAINLEVEL_OUTOF10: 6

## 2023-02-26 ASSESSMENT — PAIN DESCRIPTION - DESCRIPTORS: DESCRIPTORS: SHARP

## 2023-02-26 ASSESSMENT — PAIN DESCRIPTION - ORIENTATION: ORIENTATION: RIGHT

## 2023-02-26 ASSESSMENT — PAIN DESCRIPTION - LOCATION
LOCATION: LEG
LOCATION: LEG

## 2023-02-26 NOTE — PLAN OF CARE
Problem: Discharge Planning  Goal: Discharge to home or other facility with appropriate resources  2/26/2023 0745 by Ronald Florez RN  Outcome: Progressing  2/26/2023 0235 by Elie Barone RN  Outcome: Progressing  Flowsheets (Taken 2/25/2023 1538 by Mckenzie Aaron RN)  Discharge to home or other facility with appropriate resources:   Identify barriers to discharge with patient and caregiver   Arrange for needed discharge resources and transportation as appropriate   Identify discharge learning needs (meds, wound care, etc)   Refer to discharge planning if patient needs post-hospital services based on physician order or complex needs related to functional status, cognitive ability or social support system     Problem: Pain  Goal: Verbalizes/displays adequate comfort level or baseline comfort level  2/26/2023 0745 by Ronald Florez RN  Outcome: Progressing  2/26/2023 0235 by Elie Barone RN  Outcome: Progressing  2/25/2023 2015 by Mckenzie Aaron RN  Outcome: Progressing  Flowsheets (Taken 2/25/2023 1600)  Verbalizes/displays adequate comfort level or baseline comfort level: Encourage patient to monitor pain and request assistance     Problem: Skin/Tissue Integrity  Goal: Absence of new skin breakdown  Description: 1. Monitor for areas of redness and/or skin breakdown  2. Assess vascular access sites hourly  3. Every 4-6 hours minimum:  Change oxygen saturation probe site  4. Every 4-6 hours:  If on nasal continuous positive airway pressure, respiratory therapy assess nares and determine need for appliance change or resting period.   2/26/2023 0745 by Ronald Florez RN  Outcome: Progressing  2/26/2023 0235 by Elie Barone RN  Outcome: Progressing  2/25/2023 2015 by Mckenzie Aaron RN  Outcome: Progressing     Problem: Chronic Conditions and Co-morbidities  Goal: Patient's chronic conditions and co-morbidity symptoms are monitored and maintained or improved  2/26/2023 0745 by Asif Stewart ERICH Vargas  Outcome: Progressing  2/26/2023 0235 by Shanita Head RN  Outcome: Progressing  2/25/2023 2015 by Ania Sepulveda RN  Outcome: Progressing     Problem: Skin/Tissue Integrity - Adult  Goal: Skin integrity remains intact  2/26/2023 0745 by Trenton Pal RN  Outcome: Progressing  2/26/2023 0235 by Shanita Head RN  Outcome: Progressing  2/25/2023 2015 by Ania Sepulveda RN  Outcome: Progressing  Goal: Incisions, wounds, or drain sites healing without S/S of infection  2/26/2023 0745 by Trenton Pal RN  Outcome: Progressing  2/26/2023 0235 by Shanita Head RN  Outcome: Progressing  2/25/2023 2015 by Ania Sepulveda RN  Outcome: Progressing     Problem: Infection - Adult  Goal: Absence of infection at discharge  2/26/2023 0745 by Trenton Pal RN  Outcome: Progressing  2/26/2023 0235 by Shanita Head RN  Outcome: Progressing  Goal: Absence of infection during hospitalization  2/26/2023 0745 by Trenton Pal RN  Outcome: Progressing  2/26/2023 0235 by Shanita Head RN  Outcome: Progressing  2/25/2023 2015 by Ania Sepulveda RN  Outcome: Progressing     Problem: Nutrition Deficit:  Goal: Optimize nutritional status  2/26/2023 0745 by Trenton Pal RN  Outcome: Progressing  2/25/2023 2015 by Ania Sepulveda RN  Outcome: Progressing     Problem: Safety - Adult  Goal: Free from fall injury  2/26/2023 0745 by Trenton Pal RN  Outcome: Progressing  2/26/2023 0235 by Shanita Head RN  Outcome: Progressing  2/25/2023 2015 by Ania Sepulveda RN  Outcome: Progressing     Problem: ABCDS Injury Assessment  Goal: Absence of physical injury  2/26/2023 0745 by Trenton Pal RN  Outcome: Progressing  2/26/2023 0235 by Shanita Head RN  Outcome: Progressing  2/25/2023 2015 by Ania Sepulveda RN  Outcome: Progressing     Problem: Musculoskeletal - Adult  Goal: Return mobility to safest level of function  Outcome: Progressing  Goal: Maintain proper alignment of affected body part  Outcome: Progressing  Goal: Return ADL status to a safe level of function  Outcome: Progressing     Problem: Genitourinary - Adult  Goal: Urinary catheter remains patent  2/26/2023 0745 by Tim Block RN  Outcome: Progressing  2/25/2023 2015 by Charles Collins RN  Outcome: Progressing

## 2023-02-26 NOTE — PROGRESS NOTES
[address] [place], [place] [address]   ICU Progress Note    Admitting Date and Time: 2/24/2023  4:38 AM  Admit Dx: Elevated troponin [R77.8]  Closed fracture of right ankle, initial encounter [S82.143E]  Acute respiratory failure with hypoxia and hypercapnia (HCC) [J96.01, J96.02]  Acute hypercapnic respiratory failure (HCC) [J96.02]  Anemia, unspecified type [D64.9]  Pain of right hip joint [M25.551]    Subjective:    2/25: Patient was able to avoid intubation with Bipap. She wore most of yesterday and overnight. She states she is weak. She remembers trying to take dog out to bathroom and got tripped up with door opening and fell. Per RN, Levophed weaned down to 4 mcg/min. Currently on 10 L HFNC. Seen by wound nurse yesterday. 2/26: Patient sitting up in bed. She is feeling better and asking    Per RN:  weaned off Levophed yesterday afternoon. Oxygen down to 6L today. Patient to be transferred to [facility].      [START ON 2/27/2023] pantoprazole  40 mg Oral QAM AC    [START ON 2/27/2023] azithromycin  500 mg Oral Daily    hydrocortisone sodium succinate PF  50 mg IntraVENous Q8H    latanoprost  1 drop Both Eyes Nightly    triamcinolone   Topical BID    sodium chloride flush  5-40 mL IntraVENous 2 times per day    enoxaparin  30 mg SubCUTAneous BID    arformoterol tartrate  15 mcg Nebulization BID    albuterol  2.5 mg Nebulization Q4H WA    And    ipratropium  0.5 mg Nebulization Q4H WA    cefTRIAXone (ROCEPHIN) IV  1,000 mg IntraVENous Q24H    atorvastatin  10 mg Oral Daily    miconazole   Topical BID    sodium chloride flush  5-40 mL IntraVENous 2 times per day    heparin flush  3 mL IntraVENous 2 times per day     HYDROcodone 5 mg - acetaminophen, 1 tablet, Q6H PRN  sodium chloride flush, 5-40 mL, PRN  sodium chloride, , PRN  ondansetron, 4 mg, Q8H PRN   Or  ondansetron, 4 mg, Q6H PRN  polyethylene glycol, 17 g, Daily PRN  acetaminophen, 650 mg, Q6H PRN   Or  acetaminophen, 650 mg, Q6H PRN  glucose, 4 tablet, PRN  dextrose bolus, 125 mL, PRN   Or  dextrose bolus, 250 mL, PRN  glucagon (rDNA), 1 mg, PRN  dextrose, , Continuous PRN  sodium chloride flush, 5-40 mL, PRN  heparin flush, 3 mL, PRN       Objective:    /72   Pulse 92   Temp 98.3 °F (36.8 °C) (Oral)   Resp 26   Ht 5' 6\" (1.676 m)   Wt 276 lb 1.6 oz (125.2 kg)   SpO2 94%   BMI 44.56 kg/m²   General Appearance: alert and oriented to person, place and time and in no acute distress. Skin: multiple scabs off arms and legs as well as body. Intertrigo under right breath and right groin.   Head: normocephalic and atraumatic  Eyes: pupils equal, round, and reactive to light, extraocular eye movements intact, conjunctivae normal  Neck: neck supple and non tender without mass   Pulmonary/Chest: clear to auscultation bilaterally- no wheezes, rales or rhonchi, normal air movement, no respiratory distress  Cardiovascular: normal rate, normal S1 and S2 and no carotid bruits  Abdomen: soft, obese, non-tender, non-distended, normal bowel sounds, no masses or organomegaly  Extremities:RLE in splint/  Neurologic: no cranial nerve deficit and speech normal      Recent Labs     02/24/23 0536 02/25/23 0403 02/26/23 0422    135 134   K 3.6 3.6 4.7   CL 83* 91* 93*   CO2 31* 34* 37*   BUN 33* 37* 37*   CREATININE 1.2* 1.0 0.9   GLUCOSE 213* 149* 188*   CALCIUM 8.2* 7.6* 7.5*         Recent Labs     02/25/23 0403 02/26/23 0422   ALKPHOS 68 63   PROT 5.9* 5.8*   LABALBU 2.7* 2.7*   BILITOT 0.7 0.4   AST 16 16   ALT 7 8         Recent Labs     02/24/23 0536 02/25/23 0403 02/26/23 0422   WBC 13.6* 16.7* 16.9*   RBC 4.52 4.53 4.01   HGB 10.0* 9.7* 8.9*   HCT 36.4 34.8 31.7*   MCV 80.5 76.8* 79.1*   MCH 22.1* 21.4* 22.2*   MCHC 27.5* 27.9* 28.1*   RDW 19.6* 19.1* 18.8*    352 297   MPV 10.8 11.4 11.2        Latest Reference Range & Units 2/24/23 05:36 2/24/23 08:02   Pro-BNP 0 - 450 pg/mL 18,200 (H)    Troponin, High Sensitivity 0 - 9 ng/L 66 (H) 51 (H) (H): Data is abnormally high     Latest Reference Range & Units 2/24/23 05:22 2/24/23 08:18 2/24/23 09:49 2/24/23 12:13 2/24/23 12:46   Source:   Blood Arterial Blood Arterial Blood Arterial Blood Arterial   pH, Blood Gas 7.350 - 7.450   7.168 (LL) 7.206 (LL) 7.193 (LL) 7. 456 (H)   PH (TEMPERATURE CORRECTED) 7.350 - 7.450  7. 182 (LL)       PCO2 35.0 - 45.0 mmHg  117.6 (HH) 98.4 (HH) 105.4 (HH) 52.2 (H)   PCO2 (TEMP CORRECTED) 35.0 - 45.0 mmHg 90.9 (HH)       pO2 75.0 - 100.0 mmHg  76.0 138.1 (H) 71.1 (L) 115.5 (H)   PO2 (TEMP CORRECTED) 60.0 - 80.0 mmHg 104.3 (H)       HCO3 22.0 - 26.0 mmol/L 34.1 (H) 41.7 (H) 38.1 (H) 39.6 (H) 36.0 (H)   Base Excess -3.0 - 3.0 mmol/L 2.4 9.2 (H) 7.0 (H) 8.3 (H) 10.5 (H)   O2 Sat 92.0 - 98.5 % 95.7 89.1 (L) 97.9 87.9 (L) 98.1   THB,THB 11.5 - 16.5 g/dL  12.2 12.1 11.4 (L) 11.3 (L)   O2Hb 94.0 - 97.0 %  88.0 (L) 96.6 87.0 (L) 97.3 (H)   Carboxy-Hgb 0.0 - 1.5 %  0.7 0.7 0.5 0.3   METHB,METHB 0.0 - 1.5 %  0.5 0.6 0.5 0.5   HHb 0.0 - 5.0 %  10.8 (H) 2.1 12.0 (H) 1.9   O2 Content mL/dL  15.2 16.7 14.0 15.6   AaDO2 mmHg  315.1 451. 5 368.6 380.1   RI(T)   4.15 3.27 5.18 3.29   FIO2 % 100.0 75.0 100.0 80.0 80.0   PO2/FIO2 mmHg/%  1.01 1.38 0.89 1.44   (LL): Data is critically low  (HH): Data is critically high  (H): Data is abnormally high  (L): Data is abnormally low    Radiology:   CT ANKLE RIGHT WO CONTRAST   Final Result   Medial and posterior malleoli mildly displaced fractures with anterior   subluxation of the tibiotalar joint. XR HIP RIGHT (2-3 VIEWS)   Final Result   No acute displaced fractures         XR TIBIA FIBULA RIGHT (2 VIEWS)   Final Result   Right fibular neck fracture and questionable lateral tibial plateau fracture   for which noncontrast CT could be considered. Fracture dislocation at the   ankle with restoration of ankle and Lucien E post reduction. Fractures at the   posterior and medial malleoli.          XR KNEE RIGHT (1-2 VIEWS)   Final Result   Right fibular neck fracture and questionable lateral tibial plateau fracture   for which noncontrast CT could be considered. Fracture dislocation at the   ankle with restoration of ankle and Lucien E post reduction. Fractures at the   posterior and medial malleoli. XR ANKLE RIGHT (MIN 3 VIEWS)   Final Result   Right fibular neck fracture and questionable lateral tibial plateau fracture   for which noncontrast CT could be considered. Fracture dislocation at the   ankle with restoration of ankle and Lucien E post reduction. Fractures at the   posterior and medial malleoli. XR ANKLE RIGHT (MIN 3 VIEWS)   Final Result   Medial malleolar fracture with subluxation at the ankle manifest as anterior   widening of the ankle joint. Questionable fracture at the posterior   malleolus. See recommendations above. Heel spurs. XR CHEST 1 VIEW   Final Result   No active process allowing for rightward rotation. TTE procedure:Echo Complete W/Doppler & Color Flow. Procedure Date  Date: 02/24/2023 Start: 10:24 AM     Study Location: Portable  Technical Quality: Poor visualization due to body habitus. Indications:Abnormal cardiac enzymes. Patient Status: Routine     Contrast Medium: Definity. Height: 66 inches Weight: 241 pounds BSA: 2.17 m^2 BMI: 38.9 kg/m^2     BP: 106/54 mmHg      Findings      Left Ventricle   Ejection fraction is visually estimated at 60%. No regional wall motion   abnormalities seen. Normal left ventricular wall thickness. Left ventricle   size is normal. Indeterminate diastolic function. Assessment:  Principal Problem:    Acute on chronic respiratory failure with hypoxia and hypercapnia (HCC)  Active Problems:    Elevated troponin    Iron deficiency anemia    KIMBERLY (acute kidney injury) ([facility] [address]    Primary hypertension    Pemphigus foliaceous  Resolved Problems:    * No resolved hospital problems.  *      Plan:      Acute on chronic hypercapnic and hypoxic respiratory failure  -Pulmonary suggest suspects patient has baseline obesity hypoventilation syndrome.  -Intubation was able to be avoided with the use of BiPAP. Continued use of BiPAP at bedtime and as needed during daytime nap  -Yesterday, she was on 10 L HFNC but down to 6L. 2. Acute hypotension  -septic shock?versus adrenal insufficiency as on prolong outpatient steroid therapy. Placed on stress dose steroids on admission with hydrocortisone 100 mg IV q8 decreased to 50 mg q8  -continue ceftriaxone and azithromycin to cover for CAP. Day #3 of treatment. -PICC line placed R basilic vein 8/76 as patient had poor access and needs pressors. -weaned off Levophed yesterday afternoon. IVF discontinued today. 3. KIMBERLY (resolved)  -creatinine trend 1.2-->1.0-->0.9.    4. Elevated troponin/elevate BNP  -Echo done 2/24 shows EF 60% with indeterminate diastolic function  -elevated troponin likely demand ischemia from acute respiratory distress on admission. 5. Pemiphigus foliaceous/severe candidal skin disease  -topical  triamcinolone 0.1% cream to rash ( but not skin folds)  -miconazole 2% nitrate powder bid. 6. Primary hypertension  -home meds on hold     7. Right ankle fracture s/p fall  -reduced and splinted by orthopedics in ER on admission. Patient is NWB to E. Need to determine from orthopedics when surgical intervention is planned. 8. Disposition  -transfer to [facility]  -will need SARAH placement now and again after surgical fixation.  consulted. -PT/OT evaluation in process. Case discussed with ICU RN on the unit. NOTE: This report was transcribed using voice recognition software. Every effort was made to ensure accuracy; however, inadvertent computerized transcription errors may be present.      Electronically signed by Nina Nicholson MD on 2/26/2023 at 3:17 PM

## 2023-02-26 NOTE — PROGRESS NOTES
Pulmonary/Critical Care Progress Note    SUMMARY:  Hector Soliman is a 79 y.o. female  with a history of HTN, HLD, uterine cancer s/p hysterectomy presents with fall. Developed altered mental status and Acute on Chronic Hypercapneic Respiratory Failure in ER and transferred to ICU    ISSUES:    Acute on chronic respiratory failure with hypoxia and hypercapnia (HCC)  Suspect baseline Obesity Hypoventilation Syndrome   Much better  Off BiPAP  Needs to use qhs and PRN    Steady improvement  Will need outpt sleep eval    Hypotension  Resolved   Off Levophed   D/c fluids  PICC line  On Azithro / Ceftriaxone  Solucortef Stress dose (hx of steroid use) - start to wean  Cultures negative so far    KIMBERLY (acute kidney injury) ([facility] [address]  Improving   Follow    Fall  R Ankle fracture  Splinted - seen by ortho  CT completed    Elevated troponin  Very elevated BNP  Echo done - ?  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  ______________________________________________    SUBJECTIVE:  Alert    OBJECTIVE:  Much improved   Off BiPAP  off Levo   Acute Kidney Injury steadily improving     MEDICATIONS:   latanoprost  1 drop Both Eyes Nightly    triamcinolone   Topical BID    sodium chloride flush  5-40 mL IntraVENous 2 times per day    enoxaparin  30 mg SubCUTAneous BID    arformoterol tartrate  15 mcg Nebulization BID    albuterol  2.5 mg Nebulization Q4H WA    And    ipratropium  0.5 mg Nebulization Q4H WA    cefTRIAXone (ROCEPHIN) IV  1,000 mg IntraVENous Q24H    azithromycin  500 mg IntraVENous Q24H atorvastatin  10 mg Oral Daily    pantoprazole (PROTONIX) 40 mg injection  40 mg IntraVENous Daily    miconazole   Topical BID    sodium chloride flush  5-40 mL IntraVENous 2 times per day    heparin flush  3 mL IntraVENous 2 times per day    hydrocortisone sodium succinate PF  100 mg IntraVENous Q8H      sodium chloride      dextrose      norepinephrine Stopped (02/25/23 1735)    sodium chloride 75 mL/hr at 02/26/23 0142     HYDROcodone 5 mg - acetaminophen, sodium chloride flush, sodium chloride, ondansetron **OR** ondansetron, polyethylene glycol, acetaminophen **OR** acetaminophen, glucose, dextrose bolus **OR** dextrose bolus, glucagon (rDNA), dextrose, sodium chloride flush, heparin flush      REVIEW OF SYSTEMS:  Unable to provide, intubated    Vitals:    02/26/23 0600   BP: (!) 107/53   Pulse: 86   Resp: 22   Temp:    SpO2: 97%     FiO2 : 60 %  O2 Flow Rate (L/min): 8 L/min  O2 Device: PAP (positive airway pressure)    PHYSICAL EXAM:  General Appearance: alert, NAD  Cardiovascular: normal rate, regular rhythm, normal S1 and S2, no murmurs, rubs, clicks, or gallops, distal pulses intact,   Pulmonary/Chest: clear to auscultation bilaterally- no wheezes, rales or rhonchi, normal air movement, no respiratory distress  Abdomen: morbidly obese, soft, non-tender, non-distended, normal bowel sounds  Extremities: positive edema, R ankle splint  Skin: diffuse crusted lesions, ++ macerated skin in skin folds, foul smelling   Eyes: pupils equal, round, and reactive to light  Musculoskeletal / Neurological: grossly normal  no clear focal finding, moving 4 limbs         Additional Respiratory Assessments  Heart Rate: 86  Resp: 22  SpO2: 97 %     URINARY CATHETER OUTPUT ([place]):    Urinary Catheter 02/24/23-Output (mL): 450 mL    LABS:    WBC   Date Value Ref Range Status   02/26/2023 16.9 (H) 4.5 - 11.5 E9/L Final   02/25/2023 16.7 (H) 4.5 - 11.5 E9/L Final   02/24/2023 13.6 (H) 4.5 - 11.5 E9/L Final     Hemoglobin Date Value Ref Range Status   02/26/2023 8.9 (L) 11.5 - 15.5 g/dL Final   02/25/2023 9.7 (L) 11.5 - 15.5 g/dL Final   02/24/2023 10.0 (L) 11.5 - 15.5 g/dL Final     Hematocrit   Date Value Ref Range Status   02/26/2023 31.7 (L) 34.0 - 48.0 % Final   02/25/2023 34.8 34.0 - 48.0 % Final   02/24/2023 36.4 34.0 - 48.0 % Final     MCV   Date Value Ref Range Status   02/26/2023 79.1 (L) 80.0 - 99.9 fL Final   02/25/2023 76.8 (L) 80.0 - 99.9 fL Final   02/24/2023 80.5 80.0 - 99.9 fL Final     Platelets   Date Value Ref Range Status   02/26/2023 297 130 - 450 E9/L Final   02/25/2023 352 130 - 450 E9/L Final   02/24/2023 400 130 - 450 E9/L Final     Sodium   Date Value Ref Range Status   02/26/2023 134 132 - 146 mmol/L Final   02/25/2023 135 132 - 146 mmol/L Final   02/24/2023 135 132 - 146 mmol/L Final     Potassium   Date Value Ref Range Status   02/24/2023 3.6 3.5 - 5.0 mmol/L Final   04/22/2021 4.7 3.5 - 5.0 mmol/L Final   01/14/2021 4.3 3.5 - 5.0 mmol/L Final     Potassium reflex Magnesium   Date Value Ref Range Status   02/26/2023 4.7 3.5 - 5.0 mmol/L Final   02/25/2023 3.6 3.5 - 5.0 mmol/L Final     Chloride   Date Value Ref Range Status   02/26/2023 93 (L) 98 - 107 mmol/L Final   02/25/2023 91 (L) 98 - 107 mmol/L Final   02/24/2023 83 (L) 98 - 107 mmol/L Final     CO2   Date Value Ref Range Status   02/26/2023 37 (H) 22 - 29 mmol/L Final   02/25/2023 34 (H) 22 - 29 mmol/L Final   02/24/2023 31 (H) 22 - 29 mmol/L Final     BUN   Date Value Ref Range Status   02/26/2023 37 (H) 6 - 23 mg/dL Final   02/25/2023 37 (H) 6 - 23 mg/dL Final   02/24/2023 33 (H) 6 - 23 mg/dL Final     Creatinine   Date Value Ref Range Status   02/26/2023 0.9 0.5 - 1.0 mg/dL Final   02/25/2023 1.0 0.5 - 1.0 mg/dL Final   02/24/2023 1.2 (H) 0.5 - 1.0 mg/dL Final     Glucose   Date Value Ref Range Status   02/26/2023 188 (H) 74 - 99 mg/dL Final   02/25/2023 149 (H) 74 - 99 mg/dL Final   02/24/2023 213 (H) 74 - 99 mg/dL Final   03/02/2012 92 70 - 110 mg/dL Final     Calcium   Date Value Ref Range Status   02/26/2023 7.5 (L) 8.6 - 10.2 mg/dL Final   02/25/2023 7.6 (L) 8.6 - 10.2 mg/dL Final   02/24/2023 8.2 (L) 8.6 - 10.2 mg/dL Final     Total Protein   Date Value Ref Range Status   02/26/2023 5.8 (L) 6.4 - 8.3 g/dL Final   02/25/2023 5.9 (L) 6.4 - 8.3 g/dL Final   04/22/2021 8.3 6.4 - 8.3 g/dL Final     Albumin   Date Value Ref Range Status   02/26/2023 2.7 (L) 3.5 - 5.2 g/dL Final   02/25/2023 2.7 (L) 3.5 - 5.2 g/dL Final   04/22/2021 4.2 3.5 - 5.2 g/dL Final   03/02/2012 4.3 3.2 - 4.8 g/dL Final     Total Bilirubin   Date Value Ref Range Status   02/26/2023 0.4 0.0 - 1.2 mg/dL Final   02/25/2023 0.7 0.0 - 1.2 mg/dL Final   04/22/2021 0.5 0.0 - 1.2 mg/dL Final     Alkaline Phosphatase   Date Value Ref Range Status   02/26/2023 63 35 - 104 U/L Final   02/25/2023 68 35 - 104 U/L Final   04/22/2021 78 35 - 104 U/L Final     AST   Date Value Ref Range Status   02/26/2023 16 0 - 31 U/L Final   02/25/2023 16 0 - 31 U/L Final   04/22/2021 30 0 - 31 U/L Final     ALT   Date Value Ref Range Status   02/26/2023 8 0 - 32 U/L Final   02/25/2023 7 0 - 32 U/L Final   04/22/2021 12 0 - 32 U/L Final     Est, Glom Filt Rate   Date Value Ref Range Status   02/26/2023 >60 >=60 mL/min/1.73 Final     Comment:     Pediatric calculator link  [email or web address] [email or web address]_[email or web address]  Effective Oct 3, 2022  These results are not intended for use in patients  <25years of age. eGFR results are calculated without  a race factor using the 2021 CKD-EPI equation. Careful  clinical correlation is recommended, particularly when  comparing to results calculated using previous equations. The CKD-EPI equation is less accurate in patients with  extremes of muscle mass, extra-renal metabolism of  creatinine, excessive creatinine ingestion, or following  therapy that affects renal tubular secretion.      02/25/2023 56 >=60 mL/min/1.73 Final     Comment:     Pediatric calculator link  [email or web address]. [email or web address]-[email or web address]_calculatorped  Effective Oct 3, 2022  These results are not intended for use in patients  <25years of age. eGFR results are calculated without  a race factor using the 2021 CKD-EPI equation. Careful  clinical correlation is recommended, particularly when  comparing to results calculated using previous equations. The CKD-EPI equation is less accurate in patients with  extremes of muscle mass, extra-renal metabolism of  creatinine, excessive creatinine ingestion, or following  therapy that affects renal tubular secretion. 02/24/2023 45 >=60 mL/min/1.73 Final     Comment:     Pediatric calculator link  [email or web address]. [email or web address]-[email or web address]_calculatorped  Effective Oct 3, 2022  These results are not intended for use in patients  <25years of age. eGFR results are calculated without  a race factor using the 2021 CKD-EPI equation. Careful  clinical correlation is recommended, particularly when  comparing to results calculated using previous equations. The CKD-EPI equation is less accurate in patients with  extremes of muscle mass, extra-renal metabolism of  creatinine, excessive creatinine ingestion, or following  therapy that affects renal tubular secretion. GFR    Date Value Ref Range Status   04/22/2021 >60  Final   01/14/2021 >60  Final   09/03/2020 >60  Final     No results found for: MG  Phosphorus   Date Value Ref Range Status   02/26/2023 3.3 2.5 - 4.5 mg/dL Final   02/25/2023 2.0 (L) 2.5 - 4.5 mg/dL Final     Recent Labs     02/24/23  1246 02/25/23  0507   PH 7.456*  --    PO2 115.5*  --    PCO2 52.2*  --    HCO3 36.0* 32.3*   BE 10.5* 8.6*   O2SAT 98.1 96.7       No results for input(s): CULTRESP in the last 72 hours.     Lab Results   Component Value Date/Time    PH 7.456 02/24/2023 12:46 PM    PH 7.193 02/24/2023 12:13 PM    PH 7.206 02/24/2023 09:49 AM    PH 7.168 02/24/2023 08:18 AM    PO2 115.5 02/24/2023 12:46 PM    PO2 71.1 02/24/2023 12:13 PM    PO2 138.1 02/24/2023 09:49 AM    PO2 76.0 02/24/2023 08:18 AM    PCO2 52.2 02/24/2023 12:46 PM    PCO2 105.4 02/24/2023 12:13 PM    PCO2 98.4 02/24/2023 09:49 AM    PCO2 117.6 02/24/2023 08:18 AM    HCO3 32.3 02/25/2023 05:07 AM    HCO3 36.0 02/24/2023 12:46 PM    HCO3 39.6 02/24/2023 12:13 PM    HCO3 38.1 02/24/2023 09:49 AM    O2SAT 96.7 02/25/2023 05:07 AM    O2SAT 98.1 02/24/2023 12:46 PM    O2SAT 87.9 02/24/2023 12:13 PM    O2SAT 97.9 02/24/2023 09:49 AM       RADIOLOGY:  CT ANKLE RIGHT WO CONTRAST   Final Result   Medial and posterior malleoli mildly displaced fractures with anterior   subluxation of the tibiotalar joint. XR HIP RIGHT (2-3 VIEWS)   Final Result   No acute displaced fractures         XR TIBIA FIBULA RIGHT (2 VIEWS)   Final Result   Right fibular neck fracture and questionable lateral tibial plateau fracture   for which noncontrast CT could be considered. Fracture dislocation at the   ankle with restoration of ankle and Lucien E post reduction. Fractures at the   posterior and medial malleoli. XR KNEE RIGHT (1-2 VIEWS)   Final Result   Right fibular neck fracture and questionable lateral tibial plateau fracture   for which noncontrast CT could be considered. Fracture dislocation at the   ankle with restoration of ankle and Lucien E post reduction. Fractures at the   posterior and medial malleoli. XR ANKLE RIGHT (MIN 3 VIEWS)   Final Result   Right fibular neck fracture and questionable lateral tibial plateau fracture   for which noncontrast CT could be considered. Fracture dislocation at the   ankle with restoration of ankle and Lucien E post reduction. Fractures at the   posterior and medial malleoli. XR ANKLE RIGHT (MIN 3 VIEWS)   Final Result   Medial malleolar fracture with subluxation at the ankle manifest as anterior   widening of the ankle joint. Questionable fracture at the posterior   malleolus.   See recommendations above. Heel spurs. XR CHEST 1 VIEW   Final Result   No active process allowing for rightward rotation.            (Independently reviewed by me)      CRITICAL CARE TIME:  40 minutes    Loulou Rosenbaum M.D  Pulmonary & Critical Care  2/26/2023 6:55 AM

## 2023-02-26 NOTE — PROGRESS NOTES
Date:2/26/23  Pantoprazole and azithromycin IV therapy converted to PO per [facility] Clinical guidelines.

## 2023-02-26 NOTE — PLAN OF CARE
Problem: Discharge Planning  Goal: Discharge to home or other facility with appropriate resources  2/26/2023 0235 by Zain Farnsworth RN  Outcome: Progressing  Flowsheets (Taken 2/25/2023 1538 by Dewey Barrera RN)  Discharge to home or other facility with appropriate resources:   Identify barriers to discharge with patient and caregiver   Arrange for needed discharge resources and transportation as appropriate   Identify discharge learning needs (meds, wound care, etc)   Refer to discharge planning if patient needs post-hospital services based on physician order or complex needs related to functional status, cognitive ability or social support system     Problem: Pain  Goal: Verbalizes/displays adequate comfort level or baseline comfort level  2/26/2023 0235 by Zain Farnsworth RN  Outcome: Progressing     Problem: Skin/Tissue Integrity  Goal: Absence of new skin breakdown  Description: 1.  Monitor for areas of redness and/or skin breakdown  2.  Assess vascular access sites hourly  3.  Every 4-6 hours minimum:  Change oxygen saturation probe site  4.  Every 4-6 hours:  If on nasal continuous positive airway pressure, respiratory therapy assess nares and determine need for appliance change or resting period.  2/26/2023 0235 by Zain Farnsworth RN  Outcome: Progressing     Problem: Chronic Conditions and Co-morbidities  Goal: Patient's chronic conditions and co-morbidity symptoms are monitored and maintained or improved  2/26/2023 0235 by Zain Farnsworth RN  Outcome: Progressing     Problem: Skin/Tissue Integrity - Adult  Goal: Skin integrity remains intact  2/26/2023 0235 by Zain Farnsworth RN  Outcome: Progressing     Problem: Skin/Tissue Integrity - Adult  Goal: Incisions, wounds, or drain sites healing without S/S of infection  2/26/2023 0235 by Zain Farnsworth RN  Outcome: Progressing     Problem: Infection - Adult  Goal: Absence of infection at discharge  Outcome: Progressing     Problem: Infection -  Adult  Goal: Absence of infection during hospitalization  2/26/2023 0235 by Ferdinand Cruz RN  Outcome: Progressing     Problem: Safety - Adult  Goal: Free from fall injury  2/26/2023 0235 by Ferdinand Cruz RN  Outcome: Progressing     Problem: ABCDS Injury Assessment  Goal: Absence of physical injury  2/26/2023 0235 by Ferdinand Cruz RN  Outcome: Progressing

## 2023-02-26 NOTE — PLAN OF CARE
Problem: Pain  Goal: Verbalizes/displays adequate comfort level or baseline comfort level  2/25/2023 2015 by Adeola Burris RN  Outcome: Progressing  Flowsheets (Taken 2/25/2023 1600)  Verbalizes/displays adequate comfort level or baseline comfort level: Encourage patient to monitor pain and request assistance  2/25/2023 1255 by Aristides Quezada RN  Outcome: Progressing  2/25/2023 0745 by Olman Vance RN  Outcome: Progressing     Problem: Skin/Tissue Integrity  Goal: Absence of new skin breakdown  Description: 1. Monitor for areas of redness and/or skin breakdown  2. Assess vascular access sites hourly  3. Every 4-6 hours minimum:  Change oxygen saturation probe site  4. Every 4-6 hours:  If on nasal continuous positive airway pressure, respiratory therapy assess nares and determine need for appliance change or resting period. 2/25/2023 2015 by Adeola Burris RN  Outcome: Progressing  2/25/2023 1255 by Aristides Quezada RN  Outcome: Progressing  2/25/2023 0745 by Olman Vance RN  Outcome: Progressing     Problem: Chronic Conditions and Co-morbidities  Goal: Patient's chronic conditions and co-morbidity symptoms are monitored and maintained or improved  2/25/2023 2015 by Adeola Burris RN  Outcome: Progressing  2/25/2023 1255 by Aristides Quezada RN  Outcome: Progressing     Problem: Confusion  Goal: Confusion, delirium, dementia, or psychosis is improved or at baseline  Description: INTERVENTIONS:  1. Assess for possible contributors to thought disturbance, including medications, impaired vision or hearing, underlying metabolic abnormalities, dehydration, psychiatric diagnoses, and notify attending LIP  2. [place] high risk fall precautions, as indicated  3. Provide frequent short contacts to provide reality reorientation, refocusing and direction  4. Decrease environmental stimuli, including noise as appropriate  5.  Monitor and intervene to maintain adequate nutrition, hydration, elimination, sleep and activity  6. If unable to ensure safety without constant attention obtain sitter and review sitter guidelines with assigned personnel  7.  Initiate Psychosocial CNS and Spiritual Care consult, as indicated  2/25/2023 1835 by Silva Gaspar RN  Outcome: Completed  2/25/2023 1255 by Corey Turner RN  Outcome: Progressing     Problem: Cardiovascular - Adult  Goal: Maintains optimal cardiac output and hemodynamic stability  Outcome: Completed     Problem: Skin/Tissue Integrity - Adult  Goal: Skin integrity remains intact  Outcome: Progressing  Goal: Incisions, wounds, or drain sites healing without S/S of infection  Outcome: Progressing     Problem: Infection - Adult  Goal: Absence of infection during hospitalization  Outcome: Progressing     Problem: Genitourinary - Adult  Goal: Urinary catheter remains patent  Outcome: Progressing     Problem: Nutrition Deficit:  Goal: Optimize nutritional status  2/25/2023 2015 by Silva Gaspar RN  Outcome: Progressing  2/25/2023 1255 by Corey Turner RN  Outcome: Progressing  2/25/2023 0854 by Roney Wei RD  Outcome: Progressing     Problem: Safety - Adult  Goal: Free from fall injury  2/25/2023 2015 by Silva Gaspar RN  Outcome: Progressing  2/25/2023 1255 by Corey Turner RN  Outcome: Progressing     Problem: ABCDS Injury Assessment  Goal: Absence of physical injury  Outcome: Progressing

## 2023-02-27 PROBLEM — J96.01 ACUTE RESPIRATORY FAILURE WITH HYPOXIA AND HYPERCAPNIA (HCC): Status: ACTIVE | Noted: 2023-02-27

## 2023-02-27 PROBLEM — J18.9 COMMUNITY ACQUIRED PNEUMONIA OF RIGHT UPPER LOBE OF LUNG: Status: ACTIVE | Noted: 2023-02-27

## 2023-02-27 PROBLEM — S82.891A CLOSED FRACTURE OF RIGHT ANKLE: Status: ACTIVE | Noted: 2023-02-27

## 2023-02-27 PROBLEM — E11.65 TYPE 2 DIABETES MELLITUS WITH HYPERGLYCEMIA, WITHOUT LONG-TERM CURRENT USE OF INSULIN (HCC): Status: ACTIVE | Noted: 2023-02-27

## 2023-02-27 PROBLEM — J96.02 ACUTE RESPIRATORY FAILURE WITH HYPOXIA AND HYPERCAPNIA (HCC): Status: ACTIVE | Noted: 2023-02-27

## 2023-02-27 LAB
ALBUMIN SERPL-MCNC: 2.7 G/DL (ref 3.5–5.2)
ALP BLD-CCNC: 87 U/L (ref 35–104)
ALT SERPL-CCNC: 14 U/L (ref 0–32)
ANION GAP SERPL CALCULATED.3IONS-SCNC: 4 MMOL/L (ref 7–16)
ANISOCYTOSIS: ABNORMAL
AST SERPL-CCNC: 30 U/L (ref 0–31)
BASOPHILS ABSOLUTE: 0 E9/L (ref 0–0.2)
BASOPHILS RELATIVE PERCENT: 0.1 % (ref 0–2)
BILIRUB SERPL-MCNC: 0.3 MG/DL (ref 0–1.2)
BUN BLDV-MCNC: 30 MG/DL (ref 6–23)
CALCIUM SERPL-MCNC: 8.3 MG/DL (ref 8.6–10.2)
CHLORIDE BLD-SCNC: 95 MMOL/L (ref 98–107)
CO2: 38 MMOL/L (ref 22–29)
CREAT SERPL-MCNC: 0.6 MG/DL (ref 0.5–1)
EOSINOPHILS ABSOLUTE: 0 E9/L (ref 0.05–0.5)
EOSINOPHILS RELATIVE PERCENT: 0 % (ref 0–6)
GFR SERPL CREATININE-BSD FRML MDRD: >60 ML/MIN/1.73
GLUCOSE BLD-MCNC: 206 MG/DL (ref 74–99)
HCT VFR BLD CALC: 33.5 % (ref 34–48)
HEMOGLOBIN: 8.8 G/DL (ref 11.5–15.5)
HYPOCHROMIA: ABNORMAL
LYMPHOCYTES ABSOLUTE: 0.48 E9/L (ref 1.5–4)
LYMPHOCYTES RELATIVE PERCENT: 3.4 % (ref 20–42)
MCH RBC QN AUTO: 21.3 PG (ref 26–35)
MCHC RBC AUTO-ENTMCNC: 26.3 % (ref 32–34.5)
MCV RBC AUTO: 81.1 FL (ref 80–99.9)
METER GLUCOSE: 193 MG/DL (ref 74–99)
METER GLUCOSE: 193 MG/DL (ref 74–99)
METER GLUCOSE: 353 MG/DL (ref 74–99)
MONOCYTES ABSOLUTE: 0.16 E9/L (ref 0.1–0.95)
MONOCYTES RELATIVE PERCENT: 0.9 % (ref 2–12)
MRSA CULTURE ONLY: NORMAL
NEUTROPHILS ABSOLUTE: 15.26 E9/L (ref 1.8–7.3)
NEUTROPHILS RELATIVE PERCENT: 95.7 % (ref 43–80)
OVALOCYTES: ABNORMAL
PDW BLD-RTO: 18.8 FL (ref 11.5–15)
PHOSPHORUS: 2 MG/DL (ref 2.5–4.5)
PLATELET # BLD: 331 E9/L (ref 130–450)
PMV BLD AUTO: 11.9 FL (ref 7–12)
POIKILOCYTES: ABNORMAL
POLYCHROMASIA: ABNORMAL
POTASSIUM REFLEX MAGNESIUM: 5.1 MMOL/L (ref 3.5–5)
RBC # BLD: 4.13 E12/L (ref 3.5–5.5)
SODIUM BLD-SCNC: 137 MMOL/L (ref 132–146)
TARGET CELLS: ABNORMAL
TOTAL PROTEIN: 5.8 G/DL (ref 6.4–8.3)
URINE CULTURE, ROUTINE: NORMAL
VACUOLATED NEUTROPHILS: ABNORMAL
WBC # BLD: 15.9 E9/L (ref 4.5–11.5)

## 2023-02-27 PROCEDURE — 6360000002 HC RX W HCPCS: Performed by: INTERNAL MEDICINE

## 2023-02-27 PROCEDURE — 94640 AIRWAY INHALATION TREATMENT: CPT

## 2023-02-27 PROCEDURE — 2580000003 HC RX 258: Performed by: INTERNAL MEDICINE

## 2023-02-27 PROCEDURE — 6370000000 HC RX 637 (ALT 250 FOR IP): Performed by: INTERNAL MEDICINE

## 2023-02-27 PROCEDURE — 36592 COLLECT BLOOD FROM PICC: CPT

## 2023-02-27 PROCEDURE — 97530 THERAPEUTIC ACTIVITIES: CPT | Performed by: PHYSICAL THERAPIST

## 2023-02-27 PROCEDURE — 84100 ASSAY OF PHOSPHORUS: CPT

## 2023-02-27 PROCEDURE — 80053 COMPREHEN METABOLIC PANEL: CPT

## 2023-02-27 PROCEDURE — 2700000000 HC OXYGEN THERAPY PER DAY

## 2023-02-27 PROCEDURE — 94660 CPAP INITIATION&MGMT: CPT

## 2023-02-27 PROCEDURE — 97535 SELF CARE MNGMENT TRAINING: CPT | Performed by: OCCUPATIONAL THERAPIST

## 2023-02-27 PROCEDURE — 82962 GLUCOSE BLOOD TEST: CPT

## 2023-02-27 PROCEDURE — 97165 OT EVAL LOW COMPLEX 30 MIN: CPT | Performed by: OCCUPATIONAL THERAPIST

## 2023-02-27 PROCEDURE — 6370000000 HC RX 637 (ALT 250 FOR IP): Performed by: FAMILY MEDICINE

## 2023-02-27 PROCEDURE — 2500000003 HC RX 250 WO HCPCS: Performed by: INTERNAL MEDICINE

## 2023-02-27 PROCEDURE — 85025 COMPLETE CBC W/AUTO DIFF WBC: CPT

## 2023-02-27 PROCEDURE — 99232 SBSQ HOSP IP/OBS MODERATE 35: CPT | Performed by: INTERNAL MEDICINE

## 2023-02-27 PROCEDURE — 97530 THERAPEUTIC ACTIVITIES: CPT | Performed by: OCCUPATIONAL THERAPIST

## 2023-02-27 PROCEDURE — 6370000000 HC RX 637 (ALT 250 FOR IP)

## 2023-02-27 PROCEDURE — 97110 THERAPEUTIC EXERCISES: CPT | Performed by: PHYSICAL THERAPIST

## 2023-02-27 PROCEDURE — 2060000000 HC ICU INTERMEDIATE R&B

## 2023-02-27 RX ORDER — INSULIN LISPRO 100 [IU]/ML
0-4 INJECTION, SOLUTION INTRAVENOUS; SUBCUTANEOUS NIGHTLY
Status: DISCONTINUED | OUTPATIENT
Start: 2023-02-27 | End: 2023-03-06 | Stop reason: HOSPADM

## 2023-02-27 RX ORDER — INSULIN LISPRO 100 [IU]/ML
0-8 INJECTION, SOLUTION INTRAVENOUS; SUBCUTANEOUS
Status: DISCONTINUED | OUTPATIENT
Start: 2023-02-27 | End: 2023-03-06 | Stop reason: HOSPADM

## 2023-02-27 RX ORDER — DEXTROSE MONOHYDRATE 100 MG/ML
INJECTION, SOLUTION INTRAVENOUS CONTINUOUS PRN
Status: DISCONTINUED | OUTPATIENT
Start: 2023-02-27 | End: 2023-03-06 | Stop reason: HOSPADM

## 2023-02-27 RX ADMIN — HYDROCORTISONE SODIUM SUCCINATE 50 MG: 100 INJECTION, POWDER, FOR SOLUTION INTRAMUSCULAR; INTRAVENOUS at 19:15

## 2023-02-27 RX ADMIN — PANTOPRAZOLE SODIUM 40 MG: 40 TABLET, DELAYED RELEASE ORAL at 06:37

## 2023-02-27 RX ADMIN — Medication 10 ML: at 11:00

## 2023-02-27 RX ADMIN — ARFORMOTEROL TARTRATE 15 MCG: 15 SOLUTION RESPIRATORY (INHALATION) at 05:30

## 2023-02-27 RX ADMIN — ENOXAPARIN SODIUM 30 MG: 100 INJECTION SUBCUTANEOUS at 10:49

## 2023-02-27 RX ADMIN — ALBUTEROL SULFATE 2.5 MG: 2.5 SOLUTION RESPIRATORY (INHALATION) at 05:29

## 2023-02-27 RX ADMIN — HEPARIN 300 UNITS: 100 SYRINGE at 20:44

## 2023-02-27 RX ADMIN — LATANOPROST 1 DROP: 50 SOLUTION OPHTHALMIC at 20:44

## 2023-02-27 RX ADMIN — ARFORMOTEROL TARTRATE 15 MCG: 15 SOLUTION RESPIRATORY (INHALATION) at 18:07

## 2023-02-27 RX ADMIN — ANTI-FUNGAL POWDER MICONAZOLE NITRATE TALC FREE: 1.42 POWDER TOPICAL at 10:35

## 2023-02-27 RX ADMIN — INSULIN LISPRO 8 UNITS: 100 INJECTION, SOLUTION INTRAVENOUS; SUBCUTANEOUS at 12:56

## 2023-02-27 RX ADMIN — IPRATROPIUM BROMIDE 0.5 MG: 0.5 SOLUTION RESPIRATORY (INHALATION) at 13:23

## 2023-02-27 RX ADMIN — TRIAMCINOLONE ACETONIDE: 1 CREAM TOPICAL at 20:46

## 2023-02-27 RX ADMIN — TRIAMCINOLONE ACETONIDE: 1 CREAM TOPICAL at 13:07

## 2023-02-27 RX ADMIN — ALBUTEROL SULFATE 2.5 MG: 2.5 SOLUTION RESPIRATORY (INHALATION) at 13:23

## 2023-02-27 RX ADMIN — IPRATROPIUM BROMIDE 0.5 MG: 0.5 SOLUTION RESPIRATORY (INHALATION) at 05:29

## 2023-02-27 RX ADMIN — WATER 1000 MG: 1 INJECTION INTRAMUSCULAR; INTRAVENOUS; SUBCUTANEOUS at 06:37

## 2023-02-27 RX ADMIN — HYDROCODONE BITARTRATE AND ACETAMINOPHEN 1 TABLET: 5; 325 TABLET ORAL at 21:01

## 2023-02-27 RX ADMIN — ATORVASTATIN CALCIUM 10 MG: 10 TABLET, FILM COATED ORAL at 10:48

## 2023-02-27 RX ADMIN — HEPARIN 300 UNITS: 100 SYRINGE at 10:50

## 2023-02-27 RX ADMIN — IPRATROPIUM BROMIDE 0.5 MG: 0.5 SOLUTION RESPIRATORY (INHALATION) at 18:07

## 2023-02-27 RX ADMIN — ANTI-FUNGAL POWDER MICONAZOLE NITRATE TALC FREE: 1.42 POWDER TOPICAL at 20:44

## 2023-02-27 RX ADMIN — Medication 10 ML: at 20:47

## 2023-02-27 RX ADMIN — AZITHROMYCIN MONOHYDRATE 500 MG: 250 TABLET ORAL at 10:48

## 2023-02-27 RX ADMIN — HYDROCORTISONE SODIUM SUCCINATE 50 MG: 100 INJECTION, POWDER, FOR SOLUTION INTRAMUSCULAR; INTRAVENOUS at 10:48

## 2023-02-27 RX ADMIN — HYDROCORTISONE SODIUM SUCCINATE 50 MG: 100 INJECTION, POWDER, FOR SOLUTION INTRAMUSCULAR; INTRAVENOUS at 03:16

## 2023-02-27 RX ADMIN — ALBUTEROL SULFATE 2.5 MG: 2.5 SOLUTION RESPIRATORY (INHALATION) at 18:07

## 2023-02-27 RX ADMIN — ENOXAPARIN SODIUM 30 MG: 100 INJECTION SUBCUTANEOUS at 20:45

## 2023-02-27 RX ADMIN — Medication 10 ML: at 10:49

## 2023-02-27 ASSESSMENT — PAIN DESCRIPTION - LOCATION: LOCATION: BACK;LEG

## 2023-02-27 ASSESSMENT — PAIN SCALES - GENERAL
PAINLEVEL_OUTOF10: 0
PAINLEVEL_OUTOF10: 10
PAINLEVEL_OUTOF10: 0

## 2023-02-27 ASSESSMENT — PAIN DESCRIPTION - ORIENTATION: ORIENTATION: LEFT;LOWER

## 2023-02-27 ASSESSMENT — PAIN DESCRIPTION - DESCRIPTORS: DESCRIPTORS: ACHING

## 2023-02-27 NOTE — PLAN OF CARE
Problem: Safety - Adult  Goal: Free from fall injury  2/27/2023 0621 by Janay Laurent RN  Outcome: Progressing

## 2023-02-27 NOTE — PROGRESS NOTES
Physical Therapy  Physical Therapy Treatment Note/Plan of Care    Room #:  4789/0982-75  Patient Name: Laz Chiu  YOB: 1940  MRN: [id number]    Date of Service: 2/27/2023     Tentative placement recommendation:  [facility] vs [facility]  Equipment recommendation: To be determined      Evaluating Physical Therapist: Daniel Calvo, PT, DPT #[id number]      Specific Provider Orders/Date/Referring Provider :     02/24/23 0700    PT evaluation and treat  Start:  02/24/23 0700,   End:  02/24/23 0700,   ONE TIME,   Standing Count:  1 Occurrences,   R         Dru Ignacio MD Acknowledge New     Admitting Diagnosis:   Elevated troponin [R77.8]  Closed fracture of right ankle, initial encounter [S82.891A]  Acute respiratory failure with hypoxia and hypercapnia (HCC) [J96.01, J96.02]  Acute hypercapnic respiratory failure (HCC) [J96.02]  Anemia, unspecified type [D64.9]  Pain of right hip joint [M25.551]      Surgery: none  Visit Diagnoses         Codes    Acute respiratory failure with hypoxia and hypercapnia (HCC)    -  Primary J96.01, J96.02    Closed fracture of right ankle, initial encounter     S82.891A    Anemia, unspecified type     D64.9    Pain of right hip joint     M25.551            Patient Active Problem List   Diagnosis    Acute on chronic respiratory failure with hypoxia and hypercapnia (HCC)    Elevated troponin    Iron deficiency anemia    KIMBERLY (acute kidney injury) ([facility] [address]    Primary hypertension    Pemphigus foliaceous    Acute respiratory failure with hypoxia and hypercapnia (HCC)    Closed fracture of right ankle    Community acquired pneumonia of right upper lobe of lung    Type 2 diabetes mellitus with hyperglycemia, without long-term current use of insulin (HCC)        ASSESSMENT of Current Deficits Patient exhibits decreased strength, balance, and endurance impairing functional mobility, transfers, gait , gait distance, and tolerance to activity.  Pt required MaxA for bed mobility but declined standing sitting EOB but was agreeable to sit EOB and perform seated exercises. Pt standing was also deferred d/t fair- seated balance and random jerky movements. PHYSICAL THERAPY  PLAN OF CARE       Physical therapy plan of care is established based on physician order,  patient diagnosis and clinical assessment    Current Treatment Recommendations:    -Bed Mobility: Lower extremity exercises  and Trunk control activities   -Sitting Balance: Incorporate reaching activities to activate trunk muscles , Hands on support to maintain midline , Facilitate active trunk muscle engagement , Facilitate postural control in all planes , and Engage in core activities to allow for movement within base of support   -Standing Balance: Perform strengthening exercises in standing to promote motor control with or without upper extremity support , Instruct patient on adequate base of support to maintain balance, and Challenge balance utilizing reaching  activities beyond center of gravity    -Transfers: Provide instruction on proper hand and foot position for adequate transfer of weight onto lower extremities and use of gait device if needed, Cues for hand placement, technique and safety.  Provide stabilization to prevent fall , Facilitate weight shift forward on to lower extremities and provide necessary stabilization of bilateral lower extremities , Support transfer of weight on to lower extremities, and Assist with extension of knees trunk and hip to accept weight transfer   -Gait: Gait training, Standing activities to improve: base of support, weight shift, weight bearing , Exercises to improve trunk control, Exercises to improve hip and knee control, Performance of protected weight bearing activities, and Activities to increase weight bearing   -Endurance: Utilize Supervised activities to increase level of endurance to allow for safe functional mobility including transfers and gait  and Use graduated activities to promote good breathing techniques and provide support and education to maximize respiratory function    PT long term treatment goals are located in below grid    Patient and or family understand(s) diagnosis, prognosis, and plan of care. Frequency of treatments: Patient will be seen  daily. Prior Level of Function: Patient ambulated independently, with cane   Rehab Potential: fair + for baseline    Past medical history:   Past Medical History:   Diagnosis Date    Cancer [facility])     uterus  has hysterectomy    Fracture of right ankle     Hyperlipidemia     Hypertension     Obese      Past Surgical History:   Procedure Laterality Date    APPENDECTOMY      CARPAL TUNNEL RELEASE Right 12/05/2016    right trigger finger middle and ring    CHOLECYSTECTOMY      HYSTERECTOMY (CERVIX STATUS UNKNOWN)         SUBJECTIVE:    Precautions: Up with assistance, falls and AMS, NWB through RLE      Social history: Patient lives alone in a ranch home  with Ramp  to enter home. Walk in shower        Equipment owned: [address], and Bedside commode    AM-[facility] Basic Mobility       AM-PAC Basic Mobility - Inpatient   How much help is needed turning from your back to your side while in a flat bed without using bedrails?: A Lot  How much help is needed moving from lying on your back to sitting on the side of a flat bed without using bedrails?: A Lot  How much help is needed moving to and from a bed to a chair?: Total  How much help is needed standing up from a chair using your arms?: Total  How much help is needed walking in hospital room?: Total  How much help is needed climbing 3-5 steps with a railing?: Total  AM-PAC Inpatient Mobility Raw Score : 8  AM-PAC Inpatient T-Scale Score : 28.52  Mobility Inpatient CMS 0-100% Score: 86.62  Mobility Inpatient CMS G-Code Modifier : CM    Nursing cleared patient for PT treatment.       OBJECTIVE;   Initial Evaluation  Date: 2/25/2023 Treatment Date:  2/27/2023     Short Term/ Long Term   Goals   Was pt agreeable to Eval/treatment? Yes Y To be met in 3 days   Pain level   0/10   0/10    Bed Mobility    Rolling: Maximal assist of 1    Supine to sit: Maximal assist of 1    Sit to supine: Maximal assist of 1    Scooting: Maximal assist of 1   Rolling: Maximal assist of 1   Supine to sit: Maximal assist of 1   Sit to supine: Maximal assist of 1   Scooting: Maximal assist of 1    Rolling: Minimal assist of 1    Supine to sit: Minimal assist of 1    Sit to supine: Minimal assist of 1    Scooting: Minimal assist of 1     Transfers Sit to stand: Not assessed  pt declined Sit to stand: Not assessed  pt not safe to attempt standing   Sit to stand: Minimal assist of 1    Ambulation    not assessed     > 15  feet using  wheeled walker with Moderate assist of 1    ROM Within functional limits    Increase range of motion 10% of affected joints    Strength BUE:  refer to OT eval  RLE:  3-/5  LLE:  3-/5  Increase strength in affected mm groups by 1/3 grade   Balance Sitting EOB:  fair   Dynamic Standing:  not assessed  Sitting EOB: fair -  Dynamic Standing: not assessed    Sitting EOB:  fair +  Dynamic Standing: fair with [employer]     Patient is Alert & Oriented x person, place, time, and situation and follows directions    Sensation:  Patient  denies numbness/tingling   Edema:  no   Endurance: poor +    Vitals:  8 liters high flow nasal cannula   Blood Pressure at rest  Blood Pressure during session     Heart Rate at rest  Heart Rate during session    SPO2 at rest %  SPO2 during session %     Patient education  Patient educated on role of Physical Therapy, risks of immobility, safety and plan of care, energy conservation,  importance of mobility while in hospital , purse lip breathing, importance and purpose of adaptive device and adjusted to proper height for the patient. , safety , and weight bearing status      Patient response to education:   Pt verbalized understanding Pt demonstrated skill Pt requires further education in this area   Yes Partial Yes      Treatment:  Patient practiced and was instructed/facilitated in the following treatment: Patient Sat edge of bed 20 minutes with Min/ModA to increase dynamic sitting balance and activity tolerance. Pt performed bed mobility, sat EOB and performed seated exercises, scooted toward HOB.      Therapeutic Exercises:  ankle pumps, heel raises, hip abduction/adduction, long arc quad, and seated marching  x 10 reps x 2 sets.       At end of session, patient in bed with alarm call light and phone within reach,  all lines and tubes intact, nursing notified.      Patient would benefit from continued skilled Physical Therapy to improve functional independence and quality of life.         Patient's/ family goals   rehab    Time in  1532  Time out  1605    Total Treatment Time  33 minutes    CPT codes:    Therapeutic activities (98101)   18 minutes  1 unit(s)  Therapeutic exercises (18939)   15 minutes  1 unit(s)    Richie Leblanc, PT

## 2023-02-27 NOTE — PROGRESS NOTES
Occupational Therapy  OCCUPATIONAL THERAPY INITIAL EVALUATION     [facility]  [facility]         Date:2023                                                   Patient Name: Bobby Quach     MRN: [id number]     : 1940     Room: [place] [address]       Evaluating OT: Christina Cantor OTR/L; [id number]       Referring Provider and Orders/Date:   OT eval and treat  Start:  23 0700,   End:  23 0700,   ONE TIME,   Standing Count:  1 Occurrences,   R         Timoteo Bob MD        Diagnosis:   1. Acute respiratory failure with hypoxia and hypercapnia (HCC)    2. Closed fracture of right ankle, initial encounter    3. Anemia, unspecified type    4. Elevated troponin    5.  Pain of right hip joint         Surgery: none      Pertinent Medical History:        Past Medical History:   Diagnosis Date    Cancer [facility])     uterus  has hysterectomy    Fracture of right ankle     Hyperlipidemia     Hypertension     Obese           Past Surgical History:   Procedure Laterality Date    APPENDECTOMY      CARPAL TUNNEL RELEASE Right 2016    right trigger finger middle and ring    CHOLECYSTECTOMY      HYSTERECTOMY (CERVIX STATUS UNKNOWN)         Precautions:  Fall Risk, NWB through RLE due to ankle fx with splint and ace wrap, A x 2    Recommended placement: subacute rehab    Assessment of current deficits     [x] Functional mobility  [x]ADLs  [x] Strength               []Cognition     [x] Functional transfers   [x] IADLs         [x] Safety Awareness   [x]Endurance     [] Fine Coordination              [x] Balance      [] Vision/perception   []Sensation      [x]Gross Motor Coordination  [] ROM  [] Delirium                   [] Motor Control     OT PLAN OF CARE   OT POC based on physician orders, patient diagnosis and results of clinical assessment    Frequency/Duration 1-3 days/wk for 2 weeks PRN   Specific OT Treatment Interventions to include:   * Instruction/training on adapted ADL techniques and AE recommendations to increase functional independence within precautions       * Training on energy conservation strategies, correct breathing pattern and techniques to improve independence/tolerance for self-care routine  * Functional transfer/mobility training/DME recommendations for increased independence, safety, and fall prevention  * Patient/Family education to increase follow through with safety techniques and functional independence  * Recommendation of environmental modifications for increased safety with functional transfers/mobility and ADLs  * Therapeutic exercise to improve motor endurance, ROM, and functional strength for ADLs/functional transfers  * Therapeutic activities to facilitate/challenge dynamic balance, stand tolerance for increased safety and independence with ADLs  * Therapeutic activities to facilitate gross/fine motor skills for increased independence with ADLs  * Neuro-muscular re-education: facilitation of righting/equilibrium reactions, midline orientation, scapular stability/mobility, normalization of muscle tone, and facilitation of volitional active controled movement  * Positioning to improve skin integrity, interaction with environment and functional independence     Recommended Adaptive Equipment/DME:  TBD      Home Living: Pt lives alone in a ranch home with ramp to enter. Does not access basement. Can have family support at AL. Lift chair at home.       Bathroom setup: walk in shower     DME owned: cane, ww, rollator, 3:1     Prior Level of Function: indep with ADLs , indep with IADLs; ambulated indep with cane   Driving: no   Occupation: none   Enjoys: TV, playing on tablet, dog-tripped over    Pain Level: 6/10 with movement of L LE; positioned with pillow for comfort  Cognition: A&O: 4/4; Follows 3 step directions   Memory:  Intact   Sequencing:  fair-   Problem solving:  fair- with anxiety and pain   Judgement/safety:  fair-    AM-PAC Daily Activity - Inpatient   How much help is needed for putting on and taking off regular lower body clothing?: Total  How much help is needed for bathing (which includes washing, rinsing, drying)?: Total  How much help is needed for toileting (which includes using toilet, bedpan, or urinal)?: Total  How much help is needed for putting on and taking off regular upper body clothing?: A Lot  How much help is needed for taking care of personal grooming?: A Lot  How much help for eating meals?: A Little  AM-[facility] Inpatient Daily Activity Raw Score: 10  AM-PAC Inpatient ADL T-Scale Score : 27.31  ADL Inpatient CMS 0-100% Score: 74.7  ADL Inpatient CMS G-Code Modifier : CL    Functional Assessment:     Initial Eval Status  Date: 2/27/2023   Treatment Status  Date: STGs = LTGs  Time frame: 10-14 days   Feeding Stand by Assist and set up; Extended time, repositioning for safety/function and education on body mechanics with lunch from upright sitting in supine. Pt required set up of food items and prep of items on tray. All items placed within reach for increased indep and decreased spillage. Pt was able to use utensils and used right UE as dominate hand with self feeding. Good intake and 15% spillage overall. No coughing for choking hazard was present. Indep   Grooming Moderate Assist from supine level  Stand by Assist    UB Dressing Maximal Assist with gown management from supine level  Minimal Assist    LB Dressing Dependent with R LE in splint/wrap and L LE with heal protector  Moderate Assist    Bathing Maximal Assist x 2 recommended for rolling and buttocks with pain in side lying  Moderate Assist    Toileting Dependent with A x 2 from supine with bed pan  Moderate Assist    Bed Mobility  Max a x 2 for rolling and boost in bed. Frequent repositioning required for comfort and to maintain midline. Use of pillows.   Not appropriate to sit EOB  Supine to sit: Moderate Assist   Sit to supine: Moderate Assist    Functional Transfers  NT due to pt overall debility, decreased activity tolerance, balance deficits, safety and fall risk. Maximal Assist x 2   Functional Mobility  NT due to pt overall debility, decreased activity tolerance, balance deficits, safety and fall risk. Not appropriate at time of eval. To re-assess at a later time if appropriate. Balance Sitting:     Static:  NT    Dynamic:NT  Standing: NT  Sitting:     Static:  fair    Dynamic:fair-  Standing: poor   Activity Tolerance Vitals with activity: 3L with poor+ tolerance overall and O2 ranging from 87-92%  Increase standing tolerance for >3min with stable vital signs for carry over into toileting, functional tranfers and indep in ADLs   Visual/  Perceptual Glasses: Present; WFL    Reports change in vision since admission: No     NA     Hand Dominance  [x] Right  [] Left    AROM (PROM) Strength Additional Info:  Goal:   RUE  WFL 4-/5 good  and  FMC/dexterity noted during ADL tasks  Opposition [x] Intact [] Impaired  Finger to nose [x] Intact [] Impaired 5/5MMT generally for carry over into self care, functional transfers and functional mobility with AD. LUE WFL 4-/5 good  and  FMC/dexterity noted during ADL tasks  Opposition [x] Intact [] Impaired  Finger to nose [x] Intact [] Impaired 5/5MMT generally for carry over into self care, functional transfers and functional mobility with AD. Hearing: WFL   Sensation:  No c/o numbness or tingling   Tone: WFL   Edema: R LE    Comments: Upon arrival patient supine in bed with family present and poor positioning. Pt required max A for most UB ADLs and dep A LB ADLs tasks. The biggest barriers reflect that of functional transfers, functional mobility, UB/LB ADLs, cognition, activity tolerance, balance, safety and strengthening. At end of session, patient supine with call light and phone within reach, all lines and tubes intact.   Overall patient demonstrated decreased independence and safety during completion of ADL/functional transfer/mobility tasks compared to PLOF. Nursing updated on pt position and status following OT eval. Pt would benefit from continued skilled OT to increase safety and independence with completion of ADL/IADL tasks for functional independence and quality of life.    Treatment: OT treatment provided this date includes:  Instruction, education and training on safe facilitation and adapted techniques for completion of ADLs. These include proper positioning/alignment to improve interaction with environment and overall function and on adapted techniques/work simplification for completion of ADLs. Education provided on hand/feet placement with bed rails and body mechanics for fall prevention. Cues for energy conservation and safety for in the home at DC, including modifications and DME. Extended time to complete all tasks, including skilled monitoring of patient's response during treatment session and vital signs. Prior to and at the end of session, environmental modifications / line management completed for patients safety and efficiency of treatment session. See above for further details.    Rehab Potential: Good  for established goals     Patient / Family Goal: Pain management      Patient and/or family were instructed on functional diagnosis, prognosis/goals and OT plan of care. Demonstrated fair understanding.     Eval Complexity: Low  History: Brief review of medical records and additional review of physical, cognitive, or psychosocial history related to current functional performance  Exam: 3+ performance deficits  Assistance/Modification: Mod assistance or modifications required to perform tasks. May have comorbidities that affect occupational performance.    Time In: 1127  Time Out: 1214  Total Treatment Time: 27    Min Units   OT Eval Low 97165  x  1   OT Eval Medium 50110      OT Eval High 25347      OT Re-Eval 90704       Therapeutic Ex  40577       Therapeutic Activities 23965  12 1    ADL/Self Care 79513  15 1    Orthotic Management 47921       Manual [id number]     Neuro Re-Ed 48280       Non-Billable Time          Evaluation Time additionally includes thorough review of current medical information, gathering information on past medical history/social history and prior level of function, interpretation of standardized testing/informal observation of tasks, assessment of data and development of plan of care and goals.             Kathleen Avelar OTR/L; [id number]

## 2023-02-27 NOTE — PROGRESS NOTES
[facility]   ICU Progress Note    Admitting Date and Time: 2/24/2023  4:38 AM  Admit Dx: Elevated troponin [R77.8]  Closed fracture of right ankle, initial encounter [S82.074B]  Acute respiratory failure with hypoxia and hypercapnia (HCC) [J96.01, J96.02]  Acute hypercapnic respiratory failure (HCC) [J96.02]  Anemia, unspecified type [D64.9]  Pain of right hip joint [M25.551]    Subjective/interval history:    2/25: Patient was able to avoid intubation with Bipap. She wore most of yesterday and overnight. She states she is weak. She remembers trying to take dog out to bathroom and got tripped up with door opening and fell.  Per RN, Levophed weaned down to 4 mcg/min. Currently on 10 L HFNC. Seen by wound nurse yesterday.    2/26: Patient sitting up in bed. She is feeling better and asking    Per RN:  weaned off Levophed yesterday afternoon. Oxygen down to 6L today. Patient to be transferred to [facility].    2/27: Patient sitting up in bed.  Her daughter is present at bedside.  Has right ankle pain, but well controlled with as needed analgesics.  Still on stress dose IV steroids.     insulin lispro  0-8 Units SubCUTAneous TID WC    insulin lispro  0-4 Units SubCUTAneous Nightly    pantoprazole  40 mg Oral QAM AC    azithromycin  500 mg Oral Daily    hydrocortisone sodium succinate PF  50 mg IntraVENous Q8H    latanoprost  1 drop Both Eyes Nightly    triamcinolone   Topical BID    sodium chloride flush  5-40 mL IntraVENous 2 times per day    enoxaparin  30 mg SubCUTAneous BID    arformoterol tartrate  15 mcg Nebulization BID    albuterol  2.5 mg Nebulization Q4H WA    And    ipratropium  0.5 mg Nebulization Q4H WA    cefTRIAXone (ROCEPHIN) IV  1,000 mg IntraVENous Q24H    atorvastatin  10 mg Oral Daily    miconazole   Topical BID    sodium chloride flush  5-40 mL IntraVENous 2 times per day    heparin flush  3 mL IntraVENous 2 times per day     glucose, 4 tablet, PRN  dextrose bolus, 125 mL,  PRN   Or  dextrose bolus, 250 mL, PRN  glucagon (rDNA), 1 mg, PRN  dextrose, , Continuous PRN  HYDROcodone 5 mg - acetaminophen, 1 tablet, Q6H PRN  sodium chloride flush, 5-40 mL, PRN  sodium chloride, , PRN  ondansetron, 4 mg, Q8H PRN   Or  ondansetron, 4 mg, Q6H PRN  polyethylene glycol, 17 g, Daily PRN  acetaminophen, 650 mg, Q6H PRN   Or  acetaminophen, 650 mg, Q6H PRN  sodium chloride flush, 5-40 mL, PRN  heparin flush, 3 mL, PRN       Objective:    BP (!) 116/58   Pulse 94   Temp 98.4 °F (36.9 °C) (Oral)   Resp 20   Ht 5' 6\" (1.676 m)   Wt 272 lb 2 oz (123.4 kg)   SpO2 95%   BMI 43.92 kg/m²   General Appearance: alert and oriented to person, place and time and in no acute distress. Skin: multiple scabs off arms and legs as well as bod  Head: normocephalic and atraumatic  Eyes: pupils equal, round, and reactive to light, extraocular eye movements intact, conjunctivae normal  Neck: neck supple and non tender without mass   Pulmonary/Chest: Nonlabored on 3 L nasal cannula. Diminished but otherwise clear to auscultation bilaterally without crackles, rhonchi, or wheezes  Cardiovascular: normal rate, normal S1 and S2 and no carotid bruits  Abdomen: Obese, soft, obese, non-tender, non-distended, normal bowel sounds, no masses or organomegaly  Extremities: Right lower extremity in soft splint. Mild left lower extremity edema. No left calf tenderness. No cyanosis or clubbing.   Brisk capillary refill to toes bilaterally  Neurologic: no cranial nerve deficit and speech normal      Recent Labs     02/25/23  0403 02/26/23  0422 02/27/23  0442    134 137   K 3.6 4.7 5.1*   CL 91* 93* 95*   CO2 34* 37* 38*   BUN 37* 37* 30*   CREATININE 1.0 0.9 0.6   GLUCOSE 149* 188* 206*   CALCIUM 7.6* 7.5* 8.3*         Recent Labs     02/25/23  0403 02/26/23 0422 02/27/23 0442   ALKPHOS 68 63 87   PROT 5.9* 5.8* 5.8*   LABALBU 2.7* 2.7* 2.7*   BILITOT 0.7 0.4 0.3   AST 16 16 30   ALT 7 8 14         Recent Labs 02/25/23  0403 02/26/23  0422 02/27/23  0442   WBC 16.7* 16.9* 15.9*   RBC 4.53 4.01 4.13   HGB 9.7* 8.9* 8.8*   HCT 34.8 31.7* 33.5*   MCV 76.8* 79.1* 81.1   MCH 21.4* 22.2* 21.3*   MCHC 27.9* 28.1* 26.3*   RDW 19.1* 18.8* 18.8*    297 331   MPV 11.4 11.2 11.9        Latest Reference Range & Units 2/24/23 05:36 2/24/23 08:02   Pro-BNP 0 - 450 pg/mL 18,200 (H)    Troponin, High Sensitivity 0 - 9 ng/L 66 (H) 51 (H)   (H): Data is abnormally high     Latest Reference Range & Units 2/24/23 05:22 2/24/23 08:18 2/24/23 09:49 2/24/23 12:13 2/24/23 12:46   Source:   Blood Arterial Blood Arterial Blood Arterial Blood Arterial   pH, Blood Gas 7.350 - 7.450   7.168 (LL) 7.206 (LL) 7.193 (LL) 7. 456 (H)   PH (TEMPERATURE CORRECTED) 7.350 - 7.450  7. 182 (LL)       PCO2 35.0 - 45.0 mmHg  117.6 (HH) 98.4 (HH) 105.4 (HH) 52.2 (H)   PCO2 (TEMP CORRECTED) 35.0 - 45.0 mmHg 90.9 (HH)       pO2 75.0 - 100.0 mmHg  76.0 138.1 (H) 71.1 (L) 115.5 (H)   PO2 (TEMP CORRECTED) 60.0 - 80.0 mmHg 104.3 (H)       HCO3 22.0 - 26.0 mmol/L 34.1 (H) 41.7 (H) 38.1 (H) 39.6 (H) 36.0 (H)   Base Excess -3.0 - 3.0 mmol/L 2.4 9.2 (H) 7.0 (H) 8.3 (H) 10.5 (H)   O2 Sat 92.0 - 98.5 % 95.7 89.1 (L) 97.9 87.9 (L) 98.1   THB,THB 11.5 - 16.5 g/dL  12.2 12.1 11.4 (L) 11.3 (L)   O2Hb 94.0 - 97.0 %  88.0 (L) 96.6 87.0 (L) 97.3 (H)   Carboxy-Hgb 0.0 - 1.5 %  0.7 0.7 0.5 0.3   METHB,METHB 0.0 - 1.5 %  0.5 0.6 0.5 0.5   HHb 0.0 - 5.0 %  10.8 (H) 2.1 12.0 (H) 1.9   O2 Content mL/dL  15.2 16.7 14.0 15.6   AaDO2 mmHg  315.1 451. 5 368.6 380.1   RI(T)   4.15 3.27 5.18 3.29   FIO2 % 100.0 75.0 100.0 80.0 80.0   PO2/FIO2 mmHg/%  1.01 1.38 0.89 1.44   (LL): Data is critically low  (HH): Data is critically high  (H): Data is abnormally high  (L): Data is abnormally low    Radiology:   CT ANKLE RIGHT WO CONTRAST   Final Result   Medial and posterior malleoli mildly displaced fractures with anterior   subluxation of the tibiotalar joint.          XR HIP RIGHT (2-3 VIEWS)   Final Result   No acute displaced fractures         XR TIBIA FIBULA RIGHT (2 VIEWS)   Final Result   Right fibular neck fracture and questionable lateral tibial plateau fracture   for which noncontrast CT could be considered.  Fracture dislocation at the   ankle with restoration of ankle and Lucien E post reduction.  Fractures at the   posterior and medial malleoli.         XR KNEE RIGHT (1-2 VIEWS)   Final Result   Right fibular neck fracture and questionable lateral tibial plateau fracture   for which noncontrast CT could be considered.  Fracture dislocation at the   ankle with restoration of ankle and Lucien E post reduction.  Fractures at the   posterior and medial malleoli.         XR ANKLE RIGHT (MIN 3 VIEWS)   Final Result   Right fibular neck fracture and questionable lateral tibial plateau fracture   for which noncontrast CT could be considered.  Fracture dislocation at the   ankle with restoration of ankle and Lucien E post reduction.  Fractures at the   posterior and medial malleoli.         XR ANKLE RIGHT (MIN 3 VIEWS)   Final Result   Medial malleolar fracture with subluxation at the ankle manifest as anterior   widening of the ankle joint.  Questionable fracture at the posterior   malleolus.  See recommendations above.  Heel spurs.         XR CHEST 1 VIEW   Final Result   No active process allowing for rightward rotation.            TTE procedure:Echo Complete W/Doppler & Color Flow.     Procedure Date  Date: 02/24/2023 Start: 10:24 AM     Study Location: Portable  Technical Quality: Poor visualization due to body habitus.     Indications:Abnormal cardiac enzymes.     Patient Status: Routine     Contrast Medium: Definity.     Height: 66 inches Weight: 241 pounds BSA: 2.17 m^2 BMI: 38.9 kg/m^2     BP: 106/54 mmHg      Findings      Left Ventricle   Ejection fraction is visually estimated at 60%. No regional wall motion   abnormalities seen. Normal left ventricular wall thickness. Left ventricle   size is  normal. Indeterminate diastolic function. Assessment and Plan:  Principal Problem:    Acute on chronic respiratory failure with hypoxia and hypercapnia (HCC)  Active Problems:    Elevated troponin    Iron deficiency anemia    KIMBERLY (acute kidney injury) ([address]    Primary hypertension    Pemphigus foliaceous  Resolved Problems:    * No resolved hospital problems. *          Acute on chronic hypercapnic and hypoxic respiratory failure  -Pulmonary suggest suspects patient has baseline obesity hypoventilation syndrome.  -Intubation was able to be avoided with the use of BiPAP. Continued use of BiPAP at bedtime and as needed during daytime nap  -Now down to 3 L nasal cannula    2. Acute hypotension  -septic shock?versus adrenal insufficiency as on prolong outpatient steroid therapy. Placed on stress dose steroids on admission with hydrocortisone 100 mg IV q8 decreased to 50 mg q8 for now, plan to further taper tomorrow  -continue ceftriaxone and azithromycin to cover for CAP. Day #4 of treatment. -PICC line placed R basilic vein 9/34 as patient had poor access and needs pressors. -weaned off Levophed and now off of IV fluids. Blood pressure has been stable over the last 2 days    3. KIMBERLY (resolved)  -creatinine trend 1.2-->1.0-->0.9-->0.6    4. Elevated troponin/elevate BNP  -Echo done 2/24 shows EF 60% with indeterminate diastolic function  -elevated troponin likely demand ischemia from acute respiratory distress on admission. 5. Pemiphigus foliaceous/severe candidal skin disease  -topical  triamcinolone 0.1% cream to rash ( but not skin folds)  -miconazole 2% nitrate powder bid. 6. Primary hypertension  -home meds on hold     7. Right ankle fracture s/p fall  -reduced and splinted by orthopedics in ER on admission. Patient is NWB to RLE. She will need to follow-up with orthopedic surgery at least as an outpatient for reassessment.   We will touch base with orthopedic surgery prior to discharge for further recommendations    8. Type II diabetes mellitus  -Hemoglobin A1c 6.7, random blood glucose 353 this morning. Consistent with new diagnosis of type 2 diabetes mellitus. Blood glucose elevation in this acute setting is higher than would be expected given her A1c, likely reflection of steroid-induced hyperglycemia. We will use corrective scale insulin for now while on IV steroids, recommend diet and lifestyle modifications on discharge    9. Disposition  -transfer to [facility]  -will need SARAH placement now and again after surgical fixation.  consulted. -PT/OT evaluation in process. NOTE: This report was transcribed using voice recognition software. Every effort was made to ensure accuracy; however, inadvertent computerized transcription errors may be present.      Electronically signed by Martin Pelaez DO on 2/27/2023 at 11:54 AM

## 2023-02-27 NOTE — PLAN OF CARE
Problem: Pain  Goal: Verbalizes/displays adequate comfort level or baseline comfort level  2/26/2023 2012 by Lorrie Mix RN  Outcome: Progressing  2/26/2023 0745 by Claudene Hummer, RN  Outcome: Progressing     Problem: Skin/Tissue Integrity  Goal: Absence of new skin breakdown  Description: 1. Monitor for areas of redness and/or skin breakdown  2. Assess vascular access sites hourly  3. Every 4-6 hours minimum:  Change oxygen saturation probe site  4. Every 4-6 hours:  If on nasal continuous positive airway pressure, respiratory therapy assess nares and determine need for appliance change or resting period.   2/26/2023 2012 by Lorrie Mix RN  Outcome: Progressing  2/26/2023 0745 by Claudene Hummer, RN  Outcome: Progressing     Problem: Chronic Conditions and Co-morbidities  Goal: Patient's chronic conditions and co-morbidity symptoms are monitored and maintained or improved  2/26/2023 2012 by Lorrie Mix RN  Outcome: Progressing  2/26/2023 0745 by Claudene Hummer, RN  Outcome: Progressing     Problem: Skin/Tissue Integrity - Adult  Goal: Skin integrity remains intact  2/26/2023 2012 by Lorrie Mix RN  Outcome: Progressing  2/26/2023 0745 by Claudene Hummer, RN  Outcome: Progressing  Goal: Incisions, wounds, or drain sites healing without S/S of infection  2/26/2023 2012 by Lorrie Mix RN  Outcome: Progressing  2/26/2023 0745 by Claudene Hummer, RN  Outcome: Progressing     Problem: Infection - Adult  Goal: Absence of infection at discharge  2/26/2023 0745 by Claudene Hummer, RN  Outcome: Progressing  Goal: Absence of infection during hospitalization  2/26/2023 2012 by Lorrie Mix RN  Outcome: Progressing  2/26/2023 0745 by Claudene Hummer, RN  Outcome: Progressing     Problem: Musculoskeletal - Adult  Goal: Return mobility to safest level of function  2/26/2023 0745 by Claudene Hummer, RN  Outcome: Progressing  Goal: Maintain proper alignment of affected body part  2/26/2023 0745 by Noel Chowdary RN  Outcome: Progressing  Goal: Return ADL status to a safe level of function  2/26/2023 0745 by Noel Chowdary RN  Outcome: Progressing     Problem: Genitourinary - Adult  Goal: Urinary catheter remains patent  2/26/2023 2012 by Marixa Menjivar RN  Outcome: Progressing  2/26/2023 0745 by Noel Chowdary RN  Outcome: Progressing     Problem: Nutrition Deficit:  Goal: Optimize nutritional status  2/26/2023 2012 by Marixa Menjivar RN  Outcome: Progressing  2/26/2023 0745 by Noel Chowdary RN  Outcome: Progressing     Problem: Safety - Adult  Goal: Free from fall injury  2/26/2023 2012 by Marixa Menjivar RN  Outcome: Progressing  2/26/2023 0745 by Noel Chowdary RN  Outcome: Progressing     Problem: ABCDS Injury Assessment  Goal: Absence of physical injury  2/26/2023 2012 by Marixa Menjivar RN  Outcome: Progressing  2/26/2023 0745 by Noel Chowdary RN  Outcome: Progressing

## 2023-02-27 NOTE — PROGRESS NOTES
Pulmonary/Critical Care Progress Note    SUMMARY:  Jessie Yarbrough is a 79 y.o. female  with a history of HTN, HLD, uterine cancer s/p hysterectomy presents with fall. Developed altered mental status and Acute on Chronic Hypercapneic Respiratory Failure in ER and transferred to ICU    ISSUES:    Acute on chronic respiratory failure with hypoxia and hypercapnia (HCC)  Suspect baseline Obesity Hypoventilation Syndrome   Much better  Off BiPAP  Needs to use qhs and PRN    Steady improvement  Will need outpt sleep eval    Hypotension  Resolved   Off Levophed   D/c fluids  PICC line  On Azithro / Ceftriaxone  Solucortef Stress dose (hx of steroid use) - start to wean  Cultures negative so far    KIMBERLY (acute kidney injury) ([facility] [address]  Improving   Mildly hyperkalemic  Follow    Fall  R Ankle fracture  Splinted - seen by ortho  CT completed    Elevated troponin  Very elevated BNP  Echo done - ?  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)  Wound care team guidance appreciated   Mycostatin      Goals of care 2/24/23  With pt / daughter / grandtr   Pt indicates would agree to intubation if needed    ______________________________________________    SUBJECTIVE:  Alert    OBJECTIVE:  Steady improvement    MEDICATIONS:   insulin lispro  0-8 Units SubCUTAneous TID WC    insulin lispro  0-4 Units SubCUTAneous Nightly    pantoprazole  40 mg Oral QAM AC    azithromycin  500 mg Oral Daily    hydrocortisone sodium succinate PF  50 mg IntraVENous Q8H    latanoprost  1 drop Both Eyes Nightly    triamcinolone   Topical BID    sodium chloride flush  5-40 mL IntraVENous 2 times per day    enoxaparin  30 mg SubCUTAneous BID    arformoterol tartrate  15 mcg Nebulization BID    albuterol  2.5 mg Nebulization Q4H WA    And    ipratropium  0.5 mg Nebulization Q4H WA    cefTRIAXone (ROCEPHIN) IV  1,000 mg IntraVENous Q24H    atorvastatin  10 mg Oral Daily    miconazole   Topical BID    sodium chloride flush  5-40 mL IntraVENous 2 times per day    heparin flush  3 mL IntraVENous 2 times per day      dextrose      sodium chloride       glucose, dextrose bolus **OR** dextrose bolus, glucagon (rDNA), dextrose, HYDROcodone 5 mg - acetaminophen, sodium chloride flush, sodium chloride, ondansetron **OR** ondansetron, polyethylene glycol, acetaminophen **OR** acetaminophen, sodium chloride flush, heparin flush      REVIEW OF SYSTEMS:  Unable to provide, intubated    Vitals:    02/27/23 0532   BP:    Pulse: 89   Resp: 24   Temp:    SpO2: 92%     FiO2 : 60 %  O2 Flow Rate (L/min): 5 L/min  O2 Device: Nasal cannula    PHYSICAL EXAM:  General Appearance: alert, NAD  Cardiovascular: normal rate, regular rhythm, normal S1 and S2, no murmurs, rubs, clicks, or gallops, distal pulses intact,   Pulmonary/Chest: clear to auscultation bilaterally- no wheezes, rales or rhonchi, normal air movement, no respiratory distress  Abdomen: morbidly obese, soft, non-tender, non-distended, normal bowel sounds  Extremities: positive edema, R ankle splint  Skin: diffuse crusted lesions, ++ macerated skin in skin folds, foul smelling   Eyes: pupils equal, round, and reactive to light  Musculoskeletal / Neurological: grossly normal  no clear focal finding, moving 4 limbs         Additional Respiratory Assessments  Heart Rate: 89  Resp: 24  SpO2: 92 %     URINARY CATHETER OUTPUT ([place]):    Urinary Catheter 02/24/23-Output (mL): 650 mL    LABS:    WBC   Date Value Ref Range Status   02/27/2023 15.9 (H) 4.5 - 11.5 E9/L Final   02/26/2023 16.9 (H) 4.5 - 11.5 E9/L Final   02/25/2023 16.7 (H) 4.5 - 11.5 E9/L Final     Hemoglobin   Date Value Ref Range Status   02/27/2023 8.8 (L) 11.5 - 15.5 g/dL Final   02/26/2023 8.9 (L) 11.5 - 15.5 g/dL Final   02/25/2023 9.7 (L) 11.5 - 15.5 g/dL Final     Hematocrit   Date Value Ref Range Status   02/27/2023 33.5 (L) 34.0 - 48.0 % Final   02/26/2023 31.7 (L) 34.0 - 48.0 % Final   02/25/2023 34.8 34.0 - 48.0 % Final     MCV   Date Value Ref Range Status   02/27/2023 81.1 80.0 - 99.9 fL Final   02/26/2023 79.1 (L) 80.0 - 99.9 fL Final   02/25/2023 76.8 (L) 80.0 - 99.9 fL Final     Platelets   Date Value Ref Range Status   02/27/2023 331 130 - 450 E9/L Final   02/26/2023 297 130 - 450 E9/L Final   02/25/2023 352 130 - 450 E9/L Final     Sodium   Date Value Ref Range Status   02/27/2023 137 132 - 146 mmol/L Final   02/26/2023 134 132 - 146 mmol/L Final   02/25/2023 135 132 - 146 mmol/L Final     Potassium reflex Magnesium   Date Value Ref Range Status   02/27/2023 5.1 (H) 3.5 - 5.0 mmol/L Final   02/26/2023 4.7 3.5 - 5.0 mmol/L Final   02/25/2023 3.6 3.5 - 5.0 mmol/L Final     Chloride   Date Value Ref Range Status   02/27/2023 95 (L) 98 - 107 mmol/L Final   02/26/2023 93 (L) 98 - 107 mmol/L Final   02/25/2023 91 (L) 98 - 107 mmol/L Final     CO2   Date Value Ref Range Status   02/27/2023 38 (H) 22 - 29 mmol/L Final   02/26/2023 37 (H) 22 - 29 mmol/L Final   02/25/2023 34 (H) 22 - 29 mmol/L Final     BUN   Date Value Ref Range Status   02/27/2023 30 (H) 6 - 23 mg/dL Final   02/26/2023 37 (H) 6 - 23 mg/dL Final   02/25/2023 37 (H) 6 - 23 mg/dL Final     Creatinine   Date Value Ref Range Status   02/27/2023 0.6 0.5 - 1.0 mg/dL Final   02/26/2023 0.9 0.5 - 1.0 mg/dL Final   02/25/2023 1.0 0.5 - 1.0 mg/dL Final     Glucose   Date Value Ref Range Status   02/27/2023 206 (H) 74 - 99 mg/dL Final   02/26/2023 188 (H) 74 - 99 mg/dL Final   02/25/2023 149 (H) 74 - 99 mg/dL Final   03/02/2012 92 70 - 110 mg/dL Final     Calcium   Date Value Ref Range Status   02/27/2023 8.3 (L) 8.6 - 10.2 mg/dL Final   02/26/2023 7.5 (L) 8.6 - 10.2 mg/dL Final   02/25/2023 7.6 (L) 8.6 - 10.2 mg/dL Final     Total Protein   Date Value Ref Range Status   02/27/2023 5.8 (L) 6.4 - 8.3 g/dL Final   02/26/2023 5.8 (L) 6.4 - 8.3 g/dL Final   02/25/2023 5.9 (L) 6.4 - 8.3 g/dL Final     Albumin   Date Value Ref Range Status   02/27/2023 2.7 (L) 3.5 - 5.2 g/dL Final   02/26/2023 2.7 (L) 3.5 - 5.2 g/dL Final   02/25/2023 2.7 (L) 3.5 - 5.2 g/dL Final   03/02/2012 4.3 3.2 - 4.8 g/dL Final     Total Bilirubin   Date Value Ref Range Status   02/27/2023 0.3 0.0 - 1.2 mg/dL Final   02/26/2023 0.4 0.0 - 1.2 mg/dL Final   02/25/2023 0.7 0.0 - 1.2 mg/dL Final     Alkaline Phosphatase   Date Value Ref Range Status   02/27/2023 87 35 - 104 U/L Final   02/26/2023 63 35 - 104 U/L Final   02/25/2023 68 35 - 104 U/L Final     AST   Date Value Ref Range Status   02/27/2023 30 0 - 31 U/L Final   02/26/2023 16 0 - 31 U/L Final   02/25/2023 16 0 - 31 U/L Final     ALT   Date Value Ref Range Status   02/27/2023 14 0 - 32 U/L Final   02/26/2023 8 0 - 32 U/L Final   02/25/2023 7 0 - 32 U/L Final     Est, Glom Filt Rate   Date Value Ref Range Status   02/27/2023 >60 >=60 mL/min/1.73 Final     Comment:     Pediatric calculator link  [facility]. [email or web address]/kdoqi/gfr_calculatorped  Effective Oct 3, 2022  These results are not intended for use in patients  <25years of age. eGFR results are calculated without  a race factor using the 2021 CKD-EPI equation. Careful  clinical correlation is recommended, particularly when  comparing to results calculated using previous equations. The CKD-EPI equation is less accurate in patients with  extremes of muscle mass, extra-renal metabolism of  creatinine, excessive creatinine ingestion, or following  therapy that affects renal tubular secretion. 02/26/2023 >60 >=60 mL/min/1.73 Final     Comment:     Pediatric calculator link  [facility].at. [email or web address]/gfr_calculatorped  Effective Oct 3, 2022  These results are not intended for use in patients  <25years of age. eGFR results are calculated without  a race factor using the 2021 CKD-EPI equation.   Careful  clinical correlation is recommended, particularly when  comparing to results calculated using previous equations. The CKD-EPI equation is less accurate in patients with  extremes of muscle mass, extra-renal metabolism of  creatinine, excessive creatinine ingestion, or following  therapy that affects renal tubular secretion. 02/25/2023 56 >=60 mL/min/1.73 Final     Comment:     Pediatric calculator link  [facility].at. [email or web address]/kdoqi/gfr_calculatorped  Effective Oct 3, 2022  These results are not intended for use in patients  <25years of age. eGFR results are calculated without  a race factor using the 2021 CKD-EPI equation. Careful  clinical correlation is recommended, particularly when  comparing to results calculated using previous equations. The CKD-EPI equation is less accurate in patients with  extremes of muscle mass, extra-renal metabolism of  creatinine, excessive creatinine ingestion, or following  therapy that affects renal tubular secretion. GFR    Date Value Ref Range Status   04/22/2021 >60  Final   01/14/2021 >60  Final   09/03/2020 >60  Final     No results found for: MG  Phosphorus   Date Value Ref Range Status   02/27/2023 2.0 (L) 2.5 - 4.5 mg/dL Final   02/26/2023 3.3 2.5 - 4.5 mg/dL Final   02/25/2023 2.0 (L) 2.5 - 4.5 mg/dL Final     Recent Labs     02/24/23  1246 02/25/23  0507   PH 7.456*  --    PO2 115.5*  --    PCO2 52.2*  --    HCO3 36.0* 32.3*   BE 10.5* 8.6*   O2SAT 98.1 96.7       No results for input(s): CULTRESP in the last 72 hours.     Lab Results   Component Value Date/Time    PH 7.456 02/24/2023 12:46 PM    PH 7.193 02/24/2023 12:13 PM    PH 7.206 02/24/2023 09:49 AM    PH 7.168 02/24/2023 08:18 AM    PO2 115.5 02/24/2023 12:46 PM    PO2 71.1 02/24/2023 12:13 PM    PO2 138.1 02/24/2023 09:49 AM    PO2 76.0 02/24/2023 08:18 AM    PCO2 52.2 02/24/2023 12:46 PM    PCO2 105.4 02/24/2023 12:13 PM    PCO2 98.4 02/24/2023 09:49 AM    PCO2 117.6 02/24/2023 08:18 AM    HCO3 32.3 02/25/2023 05:07 AM    HCO3 36.0 02/24/2023 12:46 PM    HCO3 39.6 02/24/2023 12:13 PM    HCO3 38.1 02/24/2023 09:49 AM    O2SAT 96.7 02/25/2023 05:07 AM    O2SAT 98.1 02/24/2023 12:46 PM    O2SAT 87.9 02/24/2023 12:13 PM    O2SAT 97.9 02/24/2023 09:49 AM       RADIOLOGY:  CT ANKLE RIGHT WO CONTRAST   Final Result   Medial and posterior malleoli mildly displaced fractures with anterior   subluxation of the tibiotalar joint. XR HIP RIGHT (2-3 VIEWS)   Final Result   No acute displaced fractures         XR TIBIA FIBULA RIGHT (2 VIEWS)   Final Result   Right fibular neck fracture and questionable lateral tibial plateau fracture   for which noncontrast CT could be considered. Fracture dislocation at the   ankle with restoration of ankle and Lucien E post reduction. Fractures at the   posterior and medial malleoli. XR KNEE RIGHT (1-2 VIEWS)   Final Result   Right fibular neck fracture and questionable lateral tibial plateau fracture   for which noncontrast CT could be considered. Fracture dislocation at the   ankle with restoration of ankle and Lucien E post reduction. Fractures at the   posterior and medial malleoli. XR ANKLE RIGHT (MIN 3 VIEWS)   Final Result   Right fibular neck fracture and questionable lateral tibial plateau fracture   for which noncontrast CT could be considered. Fracture dislocation at the   ankle with restoration of ankle and Lucien E post reduction. Fractures at the   posterior and medial malleoli. XR ANKLE RIGHT (MIN 3 VIEWS)   Final Result   Medial malleolar fracture with subluxation at the ankle manifest as anterior   widening of the ankle joint. Questionable fracture at the posterior   malleolus. See recommendations above. Heel spurs. XR CHEST 1 VIEW   Final Result   No active process allowing for rightward rotation.            (Independently reviewed by me)      Ni Smith M.D  Pulmonary & Critical Care  2/27/2023 7:45 AM

## 2023-02-27 NOTE — PLAN OF CARE
Problem: Discharge Planning  Goal: Discharge to home or other facility with appropriate resources  Outcome: Progressing     Problem: Pain  Goal: Verbalizes/displays adequate comfort level or baseline comfort level  2/27/2023 1729 by Gila Duron RN  Outcome: Progressing     Problem: Skin/Tissue Integrity  Goal: Absence of new skin breakdown  Description: 1. Monitor for areas of redness and/or skin breakdown  2. Assess vascular access sites hourly  3. Every 4-6 hours minimum:  Change oxygen saturation probe site  4. Every 4-6 hours:  If on nasal continuous positive airway pressure, respiratory therapy assess nares and determine need for appliance change or resting period.   2/27/2023 1729 by Gila Duron RN  Outcome: Progressing

## 2023-02-27 NOTE — ACP (ADVANCE CARE PLANNING)
Advance Care Planning   Healthcare Decision Maker:    Primary Decision Maker: Jesus Reed Child - [phone]    Primary Decision Maker: Bethel González - Child - [phone]      Today we documented Decision Maker(s) consistent with Legal Next of Kin hierarchy.

## 2023-02-27 NOTE — CARE COORDINATION
Case Management Assessment  Initial Evaluation    Date/Time of Evaluation: 2/27/2023 8:55 AM  Assessment Completed by: Gem Blum RN    If patient is discharged prior to next notation, then this note serves as note for discharge by case management.    Patient Name: Tristan Monterroso                   YOB: 1940  Diagnosis: Elevated troponin [R77.8]  Closed fracture of right ankle, initial encounter [S82.891O]  Acute respiratory failure with hypoxia and hypercapnia (HCC) [J96.01, J96.02]  Acute hypercapnic respiratory failure (HCC) [J96.02]  Anemia, unspecified type [D64.9]  Pain of right hip joint [M25.551]                   Date / Time: 2/24/2023  4:38 AM    Patient Admission Status: Inpatient   Readmission Risk (Low < 19, Mod (19-27), High > 27): Readmission Risk Score: 15.6    Current PCP: Pineda Nogueira MD  PCP verified by CM? Yes    Chart Reviewed: Yes      History Provided by: Patient  Patient Orientation: Alert and Oriented    Patient Cognition: Alert    Hospitalization in the last 30 days (Readmission):  No      Advance Directives:      Code Status: Full Code   Patient's Primary Decision Maker is: Legal Next of Kin    Primary Decision Maker: KeenaFreda - Child - [phone]    Primary Decision Maker: Jericho Babb  - Child - [phone]    Discharge Planning:    Patient lives with: Alone Type of Home: House  Primary Care Giver: Self  Patient Support Systems include: Family Members   Current services prior to admission: None            Current DME:  wc, walker with arm sling attached.             Type of Home Care services:  None    ADLS  Prior functional level: Independent in ADLs/IADLs  Current functional level: Independent in ADLs/IADLs    PT AM-PAC: 8 /24      Family can provide assistance at DC: Yes  Would you like Case Management to discuss the discharge plan with any other family members/significant others, and if so, who? Yes (if needed i can all her children)  Plans to  Return to Present Housing: Yes  Other Identified Issues/Barriers to RETURNING to current housing: yes but could use SARAH- LTACH is not generally approved with her [place] coverage. Potential Assistance needed at discharge: Long Term Acute Care            Potential DME:  unsure- bipap after a sleep study after a SNF stay   Patient expects to discharge to: Roni Smiley  for transportation at discharge:  family if she goes home. Financial    Payor: Lucien Sosa / Plan: Romy Boucher ESSENTIAL/PLUS / Product Type: *No Product type* /     Potential assistance Purchasing Medications: No      [id number] [place] [facility] [address]  [address] [facility] [address]  Phone: [phone] Fax: [phone]      Notes:    Factors facilitating achievement of predicted outcomes: Family support, Cooperative, and Pleasant    Barriers to discharge: Decreased endurance    Additional Case Management Notes: 2/27/23 Transferred to . Recommendations for SARAH versus LTACH- provided pt with SARAH list to review with family. Pt with [place] coverage difficult for [facility] approval. Patient lives at home alone in single story home. She has two children legal nok. Pt has limited DME to wc, cane with arm sling/brace attached she uses if she wants to stand up straight. No oxygen at home. Informed charge nurse Piotr Luis pt is in the room and wants her food tray if she's allowed to eat. CM/SS assigned to follow. llk        The Plan for Transition of Care is related to the following treatment goals: SNF    The Patient was provided with a choice of provider and agrees   with the discharge plan. [x] Yes [] No    Freedom of choice list was provided with basic dialogue that supports the patient's individualized plan of care/goals, treatment preferences and shares the quality data associated with the providers.  [x] Yes [] No          Jahaira Rubio, ERICH-BC  Case Management Department  Ph: [phone]

## 2023-02-27 NOTE — PLAN OF CARE
Problem: Pain  Goal: Verbalizes/displays adequate comfort level or baseline comfort level  Outcome: Progressing     Problem: Skin/Tissue Integrity  Goal: Absence of new skin breakdown  Description: 1. Monitor for areas of redness and/or skin breakdown  2. Assess vascular access sites hourly  3. Every 4-6 hours minimum:  Change oxygen saturation probe site  4. Every 4-6 hours:  If on nasal continuous positive airway pressure, respiratory therapy assess nares and determine need for appliance change or resting period.   Outcome: Progressing     Problem: Chronic Conditions and Co-morbidities  Goal: Patient's chronic conditions and co-morbidity symptoms are monitored and maintained or improved  Outcome: Progressing     Problem: Skin/Tissue Integrity - Adult  Goal: Skin integrity remains intact  Outcome: Progressing     Problem: Skin/Tissue Integrity - Adult  Goal: Incisions, wounds, or drain sites healing without S/S of infection  Outcome: Progressing     Problem: Infection - Adult  Goal: Absence of infection at discharge  Outcome: Progressing     Problem: Infection - Adult  Goal: Absence of infection during hospitalization  Outcome: Progressing     Problem: Musculoskeletal - Adult  Goal: Maintain proper alignment of affected body part  Outcome: Progressing     Problem: Musculoskeletal - Adult  Goal: Return ADL status to a safe level of function  Outcome: Progressing     Problem: Genitourinary - Adult  Goal: Urinary catheter remains patent  Outcome: Progressing     Problem: Safety - Adult  Goal: Free from fall injury  2/27/2023 0621 by Aria Tierney RN  Outcome: Progressing

## 2023-02-28 LAB
ALBUMIN SERPL-MCNC: 2.9 G/DL (ref 3.5–5.2)
ALP BLD-CCNC: 62 U/L (ref 35–104)
ALT SERPL-CCNC: 16 U/L (ref 0–32)
ANION GAP SERPL CALCULATED.3IONS-SCNC: 3 MMOL/L (ref 7–16)
AST SERPL-CCNC: 27 U/L (ref 0–31)
BILIRUB SERPL-MCNC: 0.4 MG/DL (ref 0–1.2)
BUN BLDV-MCNC: 33 MG/DL (ref 6–23)
CALCIUM SERPL-MCNC: 8.7 MG/DL (ref 8.6–10.2)
CHLORIDE BLD-SCNC: 95 MMOL/L (ref 98–107)
CO2: 38 MMOL/L (ref 22–29)
CREAT SERPL-MCNC: 0.6 MG/DL (ref 0.5–1)
GFR SERPL CREATININE-BSD FRML MDRD: >60 ML/MIN/1.73
GLUCOSE BLD-MCNC: 190 MG/DL (ref 74–99)
HCT VFR BLD CALC: 32.8 % (ref 34–48)
HEMOGLOBIN: 8.6 G/DL (ref 11.5–15.5)
MCH RBC QN AUTO: 21.1 PG (ref 26–35)
MCHC RBC AUTO-ENTMCNC: 26.2 % (ref 32–34.5)
MCV RBC AUTO: 80.6 FL (ref 80–99.9)
METER GLUCOSE: 179 MG/DL (ref 74–99)
METER GLUCOSE: 199 MG/DL (ref 74–99)
METER GLUCOSE: 238 MG/DL (ref 74–99)
METER GLUCOSE: 300 MG/DL (ref 74–99)
PDW BLD-RTO: 18.9 FL (ref 11.5–15)
PLATELET # BLD: 314 E9/L (ref 130–450)
PMV BLD AUTO: 11.1 FL (ref 7–12)
POTASSIUM SERPL-SCNC: 5.9 MMOL/L (ref 3.5–5)
RBC # BLD: 4.07 E12/L (ref 3.5–5.5)
SODIUM BLD-SCNC: 136 MMOL/L (ref 132–146)
TOTAL PROTEIN: 5.6 G/DL (ref 6.4–8.3)
WBC # BLD: 11.8 E9/L (ref 4.5–11.5)

## 2023-02-28 PROCEDURE — 6360000002 HC RX W HCPCS: Performed by: INTERNAL MEDICINE

## 2023-02-28 PROCEDURE — 2580000003 HC RX 258: Performed by: INTERNAL MEDICINE

## 2023-02-28 PROCEDURE — 99232 SBSQ HOSP IP/OBS MODERATE 35: CPT | Performed by: INTERNAL MEDICINE

## 2023-02-28 PROCEDURE — 82962 GLUCOSE BLOOD TEST: CPT

## 2023-02-28 PROCEDURE — 94640 AIRWAY INHALATION TREATMENT: CPT

## 2023-02-28 PROCEDURE — 6370000000 HC RX 637 (ALT 250 FOR IP): Performed by: FAMILY MEDICINE

## 2023-02-28 PROCEDURE — 80053 COMPREHEN METABOLIC PANEL: CPT

## 2023-02-28 PROCEDURE — 2060000000 HC ICU INTERMEDIATE R&B

## 2023-02-28 PROCEDURE — 6370000000 HC RX 637 (ALT 250 FOR IP): Performed by: INTERNAL MEDICINE

## 2023-02-28 PROCEDURE — 2700000000 HC OXYGEN THERAPY PER DAY

## 2023-02-28 PROCEDURE — 94660 CPAP INITIATION&MGMT: CPT

## 2023-02-28 PROCEDURE — 6370000000 HC RX 637 (ALT 250 FOR IP)

## 2023-02-28 PROCEDURE — 85027 COMPLETE CBC AUTOMATED: CPT

## 2023-02-28 PROCEDURE — 36415 COLL VENOUS BLD VENIPUNCTURE: CPT

## 2023-02-28 RX ADMIN — Medication 10 ML: at 21:39

## 2023-02-28 RX ADMIN — LATANOPROST 1 DROP: 50 SOLUTION OPHTHALMIC at 21:40

## 2023-02-28 RX ADMIN — AZITHROMYCIN MONOHYDRATE 500 MG: 250 TABLET ORAL at 08:54

## 2023-02-28 RX ADMIN — HYDROCORTISONE SODIUM SUCCINATE 50 MG: 100 INJECTION, POWDER, FOR SOLUTION INTRAMUSCULAR; INTRAVENOUS at 13:29

## 2023-02-28 RX ADMIN — Medication 10 ML: at 09:00

## 2023-02-28 RX ADMIN — ANTI-FUNGAL POWDER MICONAZOLE NITRATE TALC FREE: 1.42 POWDER TOPICAL at 21:41

## 2023-02-28 RX ADMIN — PANTOPRAZOLE SODIUM 40 MG: 40 TABLET, DELAYED RELEASE ORAL at 05:21

## 2023-02-28 RX ADMIN — HEPARIN 300 UNITS: 100 SYRINGE at 21:40

## 2023-02-28 RX ADMIN — ALBUTEROL SULFATE 2.5 MG: 2.5 SOLUTION RESPIRATORY (INHALATION) at 11:01

## 2023-02-28 RX ADMIN — IPRATROPIUM BROMIDE 0.5 MG: 0.5 SOLUTION RESPIRATORY (INHALATION) at 13:56

## 2023-02-28 RX ADMIN — HYDROCODONE BITARTRATE AND ACETAMINOPHEN 1 TABLET: 5; 325 TABLET ORAL at 21:38

## 2023-02-28 RX ADMIN — IPRATROPIUM BROMIDE 0.5 MG: 0.5 SOLUTION RESPIRATORY (INHALATION) at 07:04

## 2023-02-28 RX ADMIN — ANTI-FUNGAL POWDER MICONAZOLE NITRATE TALC FREE: 1.42 POWDER TOPICAL at 09:11

## 2023-02-28 RX ADMIN — Medication 10 ML: at 21:41

## 2023-02-28 RX ADMIN — IPRATROPIUM BROMIDE 0.5 MG: 0.5 SOLUTION RESPIRATORY (INHALATION) at 11:01

## 2023-02-28 RX ADMIN — TRIAMCINOLONE ACETONIDE: 1 CREAM TOPICAL at 21:40

## 2023-02-28 RX ADMIN — ENOXAPARIN SODIUM 30 MG: 100 INJECTION SUBCUTANEOUS at 08:54

## 2023-02-28 RX ADMIN — IPRATROPIUM BROMIDE 0.5 MG: 0.5 SOLUTION RESPIRATORY (INHALATION) at 16:59

## 2023-02-28 RX ADMIN — ARFORMOTEROL TARTRATE 15 MCG: 15 SOLUTION RESPIRATORY (INHALATION) at 16:59

## 2023-02-28 RX ADMIN — ENOXAPARIN SODIUM 30 MG: 100 INJECTION SUBCUTANEOUS at 21:40

## 2023-02-28 RX ADMIN — ATORVASTATIN CALCIUM 10 MG: 10 TABLET, FILM COATED ORAL at 08:54

## 2023-02-28 RX ADMIN — INSULIN LISPRO 4 UNITS: 100 INJECTION, SOLUTION INTRAVENOUS; SUBCUTANEOUS at 21:39

## 2023-02-28 RX ADMIN — HEPARIN 300 UNITS: 100 SYRINGE at 08:55

## 2023-02-28 RX ADMIN — Medication 10 ML: at 08:55

## 2023-02-28 RX ADMIN — HYDROCORTISONE SODIUM SUCCINATE 50 MG: 100 INJECTION, POWDER, FOR SOLUTION INTRAMUSCULAR; INTRAVENOUS at 00:36

## 2023-02-28 RX ADMIN — INSULIN LISPRO 2 UNITS: 100 INJECTION, SOLUTION INTRAVENOUS; SUBCUTANEOUS at 12:53

## 2023-02-28 RX ADMIN — WATER 1000 MG: 1 INJECTION INTRAMUSCULAR; INTRAVENOUS; SUBCUTANEOUS at 08:54

## 2023-02-28 RX ADMIN — ALBUTEROL SULFATE 2.5 MG: 2.5 SOLUTION RESPIRATORY (INHALATION) at 07:04

## 2023-02-28 RX ADMIN — ALBUTEROL SULFATE 2.5 MG: 2.5 SOLUTION RESPIRATORY (INHALATION) at 13:55

## 2023-02-28 RX ADMIN — TRIAMCINOLONE ACETONIDE: 1 CREAM TOPICAL at 09:10

## 2023-02-28 RX ADMIN — ALBUTEROL SULFATE 2.5 MG: 2.5 SOLUTION RESPIRATORY (INHALATION) at 16:59

## 2023-02-28 RX ADMIN — ARFORMOTEROL TARTRATE 15 MCG: 15 SOLUTION RESPIRATORY (INHALATION) at 07:04

## 2023-02-28 ASSESSMENT — PAIN DESCRIPTION - LOCATION: LOCATION: ANKLE

## 2023-02-28 ASSESSMENT — PAIN - FUNCTIONAL ASSESSMENT: PAIN_FUNCTIONAL_ASSESSMENT: ACTIVITIES ARE NOT PREVENTED

## 2023-02-28 ASSESSMENT — PAIN SCALES - GENERAL
PAINLEVEL_OUTOF10: 0
PAINLEVEL_OUTOF10: 6

## 2023-02-28 ASSESSMENT — PAIN DESCRIPTION - ORIENTATION: ORIENTATION: RIGHT

## 2023-02-28 ASSESSMENT — PAIN SCALES - WONG BAKER: WONGBAKER_NUMERICALRESPONSE: 0

## 2023-02-28 ASSESSMENT — PAIN DESCRIPTION - DESCRIPTORS: DESCRIPTORS: ACHING

## 2023-02-28 NOTE — PLAN OF CARE
Problem: Discharge Planning  Goal: Discharge to home or other facility with appropriate resources  Outcome: Progressing     Problem: Pain  Goal: Verbalizes/displays adequate comfort level or baseline comfort level  Outcome: Progressing     Problem: Skin/Tissue Integrity  Goal: Absence of new skin breakdown  Description: 1. Monitor for areas of redness and/or skin breakdown  2. Assess vascular access sites hourly  3. Every 4-6 hours minimum:  Change oxygen saturation probe site  4. Every 4-6 hours:  If on nasal continuous positive airway pressure, respiratory therapy assess nares and determine need for appliance change or resting period.   Outcome: Progressing     Problem: Chronic Conditions and Co-morbidities  Goal: Patient's chronic conditions and co-morbidity symptoms are monitored and maintained or improved  Outcome: Progressing     Problem: Skin/Tissue Integrity - Adult  Goal: Skin integrity remains intact  Outcome: Progressing  Goal: Incisions, wounds, or drain sites healing without S/S of infection  Outcome: Progressing

## 2023-02-28 NOTE — CARE COORDINATION
2/28/23 1214 CM note: COVID (-) 2/24/23. 3L vs FIO2 60% , IV solucortef & rocephin, po zithromax. Pt was a tx from ICU yesterday. Soft cast to R ankle. Met with patient and her daughter Jigna Ceron at the bedside today to discuss discharge planning. Discussed SNF options and provided SNF list for pt/family to review. CM will follow for SNF choices. Patient lives at home alone in single story home. She has two children legal nok. Pt has limited DME to wc, cane with arm sling/brace attached she uses if she wants to stand up straight. Electronically signed by Oscar Alfaro RN on 2/28/2023 at 12:20 PM    Update: 2/28/23 1346 Pts daughter Jigna Ceron provides the following SNF choices: 1) [facility]- referral [facility] to [facility] to review. Notified her that pt is stable to start precert if she can accept  2) [facility]- per [facility] no beds availlable at this time. Updated Christine on above and asked her to review list for back up choices.  Electronically signed by Oscar Alfaro RN on 2/28/2023 at 1:48 PM

## 2023-02-28 NOTE — FLOWSHEET NOTE
Pt was on Bipap when this nurse arrived in the room she wasn't sure why she was on it or who put it on her. Pt stated that she wanted to eat so tried to take biapap off pt and placed on 3L. While this nurse was administering medications pt stated that she was SOB checked pulse ox and pt was only at 85% reapplied bipap and pt is now at 98%. She states that she feels better and she doesn't feel SOB at this time. Will continue to monitor and advise doctor.

## 2023-02-28 NOTE — PROGRESS NOTES
[address] [place], [place] [address]ist   ICU Progress Note    Admitting Date and Time: 2/24/2023  4:38 AM  Admit Dx: Elevated troponin [R77.8]  Closed fracture of right ankle, initial encounter [S82.389F]  Acute respiratory failure with hypoxia and hypercapnia (HCC) [J96.01, J96.02]  Acute hypercapnic respiratory failure (HCC) [J96.02]  Anemia, unspecified type [D64.9]  Pain of right hip joint [M25.551]    Subjective/interval history:    2/25: Patient was able to avoid intubation with Bipap. She wore most of yesterday and overnight. She states she is weak. She remembers trying to take dog out to bathroom and got tripped up with door opening and fell. Per RN, Levophed weaned down to 4 mcg/min. Currently on 10 L HFNC. Seen by wound nurse yesterday. 2/26: Patient sitting up in bed. She is feeling better and asking    Per RN:  weaned off Levophed yesterday afternoon. Oxygen down to 6L today. Patient to be transferred to [facility].    2/27: Patient sitting up in bed. Her daughter is present at bedside. Has right ankle pain, but well controlled with as needed analgesics. Still on stress dose IV steroids. 2/28: Patient sitting up in bed, her daughter is at bedside. States she had a rough morning today. RN note reviewed and she had an episode of desaturation and was placed back on BiPAP. Feels better now, breathing comfortably. During encounter was on 5.5 L nasal cannula, I decrease this to 5 L as oxygen saturation in the high 90s.      insulin lispro  0-8 Units SubCUTAneous TID WC    insulin lispro  0-4 Units SubCUTAneous Nightly    pantoprazole  40 mg Oral QAM AC    azithromycin  500 mg Oral Daily    hydrocortisone sodium succinate PF  50 mg IntraVENous Q8H    latanoprost  1 drop Both Eyes Nightly    triamcinolone   Topical BID    sodium chloride flush  5-40 mL IntraVENous 2 times per day    enoxaparin  30 mg SubCUTAneous BID    arformoterol tartrate  15 mcg Nebulization BID    albuterol  2.5 mg Nebulization Q4H WA    And    ipratropium  0.5 mg Nebulization Q4H WA    cefTRIAXone (ROCEPHIN) IV  1,000 mg IntraVENous Q24H    atorvastatin  10 mg Oral Daily    miconazole   Topical BID    sodium chloride flush  5-40 mL IntraVENous 2 times per day    heparin flush  3 mL IntraVENous 2 times per day     glucose, 4 tablet, PRN  dextrose bolus, 125 mL, PRN   Or  dextrose bolus, 250 mL, PRN  glucagon (rDNA), 1 mg, PRN  dextrose, , Continuous PRN  HYDROcodone 5 mg - acetaminophen, 1 tablet, Q6H PRN  sodium chloride flush, 5-40 mL, PRN  sodium chloride, , PRN  ondansetron, 4 mg, Q8H PRN   Or  ondansetron, 4 mg, Q6H PRN  polyethylene glycol, 17 g, Daily PRN  acetaminophen, 650 mg, Q6H PRN   Or  acetaminophen, 650 mg, Q6H PRN  sodium chloride flush, 5-40 mL, PRN  heparin flush, 3 mL, PRN       Objective:    /61   Pulse 72   Temp 97.7 °F (36.5 °C) (Oral)   Resp 20   Ht 5' 6\" (1.676 m)   Wt 272 lb 2 oz (123.4 kg)   SpO2 100%   BMI 43.92 kg/m²   General Appearance: alert and oriented to person, place and time and in no acute distress. Skin: multiple scabs off arms and legs as well as bod  Head: normocephalic and atraumatic  Eyes: pupils equal, round, and reactive to light, extraocular eye movements intact, conjunctivae normal  Neck: neck supple and non tender without mass   Pulmonary/Chest: Nonlabored on 5 L nasal cannula. Diminished but otherwise clear to auscultation bilaterally without crackles, rhonchi, or wheezes  Cardiovascular: normal rate, normal S1 and S2 and no carotid bruits  Abdomen: Obese, soft, obese, non-tender, non-distended, normal bowel sounds, no masses or organomegaly  Extremities: Right lower extremity in soft splint. Mild left lower extremity edema. No left calf tenderness. No cyanosis or clubbing.   Brisk capillary refill to toes bilaterally  Neurologic: no cranial nerve deficit and speech normal      Recent Labs     02/26/23  0422 02/27/23  0442 02/28/23  0530    137 136   K 4.7 5.1* 5.9*   CL 93* 95* 95*   CO2 37* 38* 38*   BUN 37* 30* 33*   CREATININE 0.9 0.6 0.6   GLUCOSE 188* 206* 190*   CALCIUM 7.5* 8.3* 8.7         Recent Labs     02/26/23 0422 02/27/23 0442 02/28/23  0530   ALKPHOS 63 87 62   PROT 5.8* 5.8* 5.6*   LABALBU 2.7* 2.7* 2.9*   BILITOT 0.4 0.3 0.4   AST 16 30 27   ALT 8 14 16         Recent Labs     02/26/23 0422 02/27/23 0442 02/28/23  0530   WBC 16.9* 15.9* 11.8*   RBC 4.01 4.13 4.07   HGB 8.9* 8.8* 8.6*   HCT 31.7* 33.5* 32.8*   MCV 79.1* 81.1 80.6   MCH 22.2* 21.3* 21.1*   MCHC 28.1* 26.3* 26.2*   RDW 18.8* 18.8* 18.9*    331 314   MPV 11.2 11.9 11.1        Latest Reference Range & Units 2/24/23 05:36 2/24/23 08:02   Pro-BNP 0 - 450 pg/mL 18,200 (H)    Troponin, High Sensitivity 0 - 9 ng/L 66 (H) 51 (H)   (H): Data is abnormally high     Latest Reference Range & Units 2/24/23 05:22 2/24/23 08:18 2/24/23 09:49 2/24/23 12:13 2/24/23 12:46   Source:   Blood Arterial Blood Arterial Blood Arterial Blood Arterial   pH, Blood Gas 7.350 - 7.450   7.168 (LL) 7.206 (LL) 7.193 (LL) 7. 456 (H)   PH (TEMPERATURE CORRECTED) 7.350 - 7.450  7. 182 (LL)       PCO2 35.0 - 45.0 mmHg  117.6 (HH) 98.4 (HH) 105.4 (HH) 52.2 (H)   PCO2 (TEMP CORRECTED) 35.0 - 45.0 mmHg 90.9 (HH)       pO2 75.0 - 100.0 mmHg  76.0 138.1 (H) 71.1 (L) 115.5 (H)   PO2 (TEMP CORRECTED) 60.0 - 80.0 mmHg 104.3 (H)       HCO3 22.0 - 26.0 mmol/L 34.1 (H) 41.7 (H) 38.1 (H) 39.6 (H) 36.0 (H)   Base Excess -3.0 - 3.0 mmol/L 2.4 9.2 (H) 7.0 (H) 8.3 (H) 10.5 (H)   O2 Sat 92.0 - 98.5 % 95.7 89.1 (L) 97.9 87.9 (L) 98.1   THB,THB 11.5 - 16.5 g/dL  12.2 12.1 11.4 (L) 11.3 (L)   O2Hb 94.0 - 97.0 %  88.0 (L) 96.6 87.0 (L) 97.3 (H)   Carboxy-Hgb 0.0 - 1.5 %  0.7 0.7 0.5 0.3   METHB,METHB 0.0 - 1.5 %  0.5 0.6 0.5 0.5   HHb 0.0 - 5.0 %  10.8 (H) 2.1 12.0 (H) 1.9   O2 Content mL/dL  15.2 16.7 14.0 15.6   AaDO2 mmHg  315.1 451. 5 368.6 380.1   RI(T)   4.15 3.27 5.18 3.29   FIO2 % 100.0 75.0 100.0 80.0 80.0   PO2/FIO2 mmHg/%  1.01 1.38 0.89 1.44   (LL): Data is critically low  [facility]): Data is critically high  (H): Data is abnormally high  (L): Data is abnormally low    Radiology:   CT ANKLE RIGHT WO CONTRAST   Final Result   Medial and posterior malleoli mildly displaced fractures with anterior   subluxation of the tibiotalar joint. XR HIP RIGHT (2-3 VIEWS)   Final Result   No acute displaced fractures         XR TIBIA FIBULA RIGHT (2 VIEWS)   Final Result   Right fibular neck fracture and questionable lateral tibial plateau fracture   for which noncontrast CT could be considered. Fracture dislocation at the   ankle with restoration of ankle and Lucien E post reduction. Fractures at the   posterior and medial malleoli. XR KNEE RIGHT (1-2 VIEWS)   Final Result   Right fibular neck fracture and questionable lateral tibial plateau fracture   for which noncontrast CT could be considered. Fracture dislocation at the   ankle with restoration of ankle and Lucien E post reduction. Fractures at the   posterior and medial malleoli. XR ANKLE RIGHT (MIN 3 VIEWS)   Final Result   Right fibular neck fracture and questionable lateral tibial plateau fracture   for which noncontrast CT could be considered. Fracture dislocation at the   ankle with restoration of ankle and Lucien E post reduction. Fractures at the   posterior and medial malleoli. XR ANKLE RIGHT (MIN 3 VIEWS)   Final Result   Medial malleolar fracture with subluxation at the ankle manifest as anterior   widening of the ankle joint. Questionable fracture at the posterior   malleolus. See recommendations above. Heel spurs. XR CHEST 1 VIEW   Final Result   No active process allowing for rightward rotation. TTE procedure:Echo Complete W/Doppler & Color Flow. Procedure Date  Date: 02/24/2023 Start: 10:24 AM     Study Location: Portable  Technical Quality: Poor visualization due to body habitus. Indications:Abnormal cardiac enzymes.      Patient Status: Routine     Contrast Medium: Definity. Height: 66 inches Weight: 241 pounds BSA: 2.17 m^2 BMI: 38.9 kg/m^2     BP: 106/54 mmHg      Findings      Left Ventricle   Ejection fraction is visually estimated at 60%. No regional wall motion   abnormalities seen. Normal left ventricular wall thickness. Left ventricle   size is normal. Indeterminate diastolic function. Assessment and Plan:  Principal Problem:    Acute on chronic respiratory failure with hypoxia and hypercapnia (HCC)  Active Problems:    Elevated troponin    Iron deficiency anemia    KIMBERLY (acute kidney injury) ([facility] [address]    Primary hypertension    Pemphigus foliaceous    Acute respiratory failure with hypoxia and hypercapnia (HCC)    Closed fracture of right ankle    Community acquired pneumonia of right upper lobe of lung    Type 2 diabetes mellitus with hyperglycemia, without long-term current use of insulin (HCC)  Resolved Problems:    * No resolved hospital problems. *          Acute on chronic hypercapnic and hypoxic respiratory failure  -Pulmonary suggest suspects patient has baseline obesity hypoventilation syndrome.  -Intubation was able to be avoided with the use of BiPAP. Continued use of BiPAP at bedtime and as needed during daytime nap  -Now down to 3 L nasal cannula    2. Acute hypotension  -septic shock?versus adrenal insufficiency as on prolong outpatient steroid therapy. Placed on stress dose steroids on admission with hydrocortisone 100 mg IV q8. This is being weaned down. Decrease to 50 mg every 12 hours for now, will likely stop tomorrow  -continue ceftriaxone and azithromycin to cover for CAP. Day #5 of treatment. -PICC line placed R basilic vein 8/42 as patient had poor access and needs pressors. -weaned off Levophed and now off of IV fluids. Blood pressure has been stable over the last 2 days    3. KIMBERLY (resolved)  -creatinine trend 1.2-->1.0-->0.9-->0.6-->0.6    4.  Elevated troponin/elevate BNP  -Echo done 2/24 shows EF 60% with indeterminate diastolic function  -elevated troponin likely demand ischemia from acute respiratory distress on admission. 5. Pemiphigus foliaceous/severe candidal skin disease  -topical  triamcinolone 0.1% cream to rash ( but not skin folds)  -miconazole 2% nitrate powder bid. 6. Primary hypertension  -home meds on hold     7. Right ankle fracture s/p fall  -reduced and splinted by orthopedics in ER on admission. Patient is [facility] to [facility]. She will need to follow-up with orthopedic surgery at least as an outpatient for reassessment. We will touch base with orthopedic surgery prior to discharge for further recommendations    8. Type II diabetes mellitus  -Hemoglobin A1c 6.7, random blood glucose as high as 353. Consistent with new diagnosis of type 2 diabetes mellitus. Blood glucose elevation in this acute setting is higher than would be expected given her A1c, likely reflection of steroid-induced hyperglycemia. We will use corrective scale insulin for now while on IV steroids, recommended diet and lifestyle modifications on discharge    9. Disposition  -transfer to [facility]  -will need SARAH placement now and again after surgical fixation.  consulted. -PT/OT evaluation in process. NOTE: This report was transcribed using voice recognition software. Every effort was made to ensure accuracy; however, inadvertent computerized transcription errors may be present.      Electronically signed by Nimisha Spence DO on 2/28/2023 at 8:46 AM

## 2023-03-01 ENCOUNTER — ANESTHESIA EVENT (OUTPATIENT)
Dept: OPERATING ROOM | Age: 83
End: 2023-03-01
Payer: MEDICARE

## 2023-03-01 LAB
ANION GAP SERPL CALCULATED.3IONS-SCNC: 1 MMOL/L (ref 7–16)
BUN BLDV-MCNC: 31 MG/DL (ref 6–23)
CALCIUM SERPL-MCNC: 9 MG/DL (ref 8.6–10.2)
CHLORIDE BLD-SCNC: 95 MMOL/L (ref 98–107)
CO2: 39 MMOL/L (ref 22–29)
CREAT SERPL-MCNC: 0.5 MG/DL (ref 0.5–1)
GFR SERPL CREATININE-BSD FRML MDRD: >60 ML/MIN/1.73
GLUCOSE BLD-MCNC: 146 MG/DL (ref 74–99)
METER GLUCOSE: 147 MG/DL (ref 74–99)
METER GLUCOSE: 172 MG/DL (ref 74–99)
METER GLUCOSE: 243 MG/DL (ref 74–99)
POTASSIUM SERPL-SCNC: 5.4 MMOL/L (ref 3.5–5)
SODIUM BLD-SCNC: 135 MMOL/L (ref 132–146)

## 2023-03-01 PROCEDURE — 2580000003 HC RX 258: Performed by: INTERNAL MEDICINE

## 2023-03-01 PROCEDURE — 6360000002 HC RX W HCPCS: Performed by: INTERNAL MEDICINE

## 2023-03-01 PROCEDURE — 80048 BASIC METABOLIC PNL TOTAL CA: CPT

## 2023-03-01 PROCEDURE — 6370000000 HC RX 637 (ALT 250 FOR IP): Performed by: FAMILY MEDICINE

## 2023-03-01 PROCEDURE — 97530 THERAPEUTIC ACTIVITIES: CPT | Performed by: PHYSICAL THERAPIST

## 2023-03-01 PROCEDURE — 6370000000 HC RX 637 (ALT 250 FOR IP): Performed by: INTERNAL MEDICINE

## 2023-03-01 PROCEDURE — 99232 SBSQ HOSP IP/OBS MODERATE 35: CPT | Performed by: INTERNAL MEDICINE

## 2023-03-01 PROCEDURE — 97110 THERAPEUTIC EXERCISES: CPT | Performed by: PHYSICAL THERAPIST

## 2023-03-01 PROCEDURE — 36415 COLL VENOUS BLD VENIPUNCTURE: CPT

## 2023-03-01 PROCEDURE — 82962 GLUCOSE BLOOD TEST: CPT

## 2023-03-01 PROCEDURE — 94640 AIRWAY INHALATION TREATMENT: CPT

## 2023-03-01 PROCEDURE — 2060000000 HC ICU INTERMEDIATE R&B

## 2023-03-01 PROCEDURE — 2700000000 HC OXYGEN THERAPY PER DAY

## 2023-03-01 PROCEDURE — 6370000000 HC RX 637 (ALT 250 FOR IP)

## 2023-03-01 PROCEDURE — 94660 CPAP INITIATION&MGMT: CPT

## 2023-03-01 RX ADMIN — SODIUM ZIRCONIUM CYCLOSILICATE 10 G: 10 POWDER, FOR SUSPENSION ORAL at 17:22

## 2023-03-01 RX ADMIN — ANTI-FUNGAL POWDER MICONAZOLE NITRATE TALC FREE: 1.42 POWDER TOPICAL at 09:32

## 2023-03-01 RX ADMIN — Medication 10 ML: at 09:31

## 2023-03-01 RX ADMIN — HEPARIN 300 UNITS: 100 SYRINGE at 09:37

## 2023-03-01 RX ADMIN — ANTI-FUNGAL POWDER MICONAZOLE NITRATE TALC FREE: 1.42 POWDER TOPICAL at 21:46

## 2023-03-01 RX ADMIN — AZITHROMYCIN MONOHYDRATE 500 MG: 250 TABLET ORAL at 09:31

## 2023-03-01 RX ADMIN — PANTOPRAZOLE SODIUM 40 MG: 40 TABLET, DELAYED RELEASE ORAL at 06:11

## 2023-03-01 RX ADMIN — HEPARIN 300 UNITS: 100 SYRINGE at 21:45

## 2023-03-01 RX ADMIN — ALBUTEROL SULFATE 2.5 MG: 2.5 SOLUTION RESPIRATORY (INHALATION) at 14:14

## 2023-03-01 RX ADMIN — LATANOPROST 1 DROP: 50 SOLUTION OPHTHALMIC at 21:46

## 2023-03-01 RX ADMIN — ARFORMOTEROL TARTRATE 15 MCG: 15 SOLUTION RESPIRATORY (INHALATION) at 18:48

## 2023-03-01 RX ADMIN — IPRATROPIUM BROMIDE 0.5 MG: 0.5 SOLUTION RESPIRATORY (INHALATION) at 09:34

## 2023-03-01 RX ADMIN — ALBUTEROL SULFATE 2.5 MG: 2.5 SOLUTION RESPIRATORY (INHALATION) at 09:34

## 2023-03-01 RX ADMIN — ARFORMOTEROL TARTRATE 15 MCG: 15 SOLUTION RESPIRATORY (INHALATION) at 06:30

## 2023-03-01 RX ADMIN — IPRATROPIUM BROMIDE 0.5 MG: 0.5 SOLUTION RESPIRATORY (INHALATION) at 14:14

## 2023-03-01 RX ADMIN — WATER 1000 MG: 1 INJECTION INTRAMUSCULAR; INTRAVENOUS; SUBCUTANEOUS at 09:36

## 2023-03-01 RX ADMIN — HYDROCODONE BITARTRATE AND ACETAMINOPHEN 1 TABLET: 5; 325 TABLET ORAL at 09:36

## 2023-03-01 RX ADMIN — TRIAMCINOLONE ACETONIDE: 1 CREAM TOPICAL at 21:46

## 2023-03-01 RX ADMIN — ALBUTEROL SULFATE 2.5 MG: 2.5 SOLUTION RESPIRATORY (INHALATION) at 06:29

## 2023-03-01 RX ADMIN — Medication 10 ML: at 21:46

## 2023-03-01 RX ADMIN — Medication 10 ML: at 21:47

## 2023-03-01 RX ADMIN — ALBUTEROL SULFATE 2.5 MG: 2.5 SOLUTION RESPIRATORY (INHALATION) at 18:48

## 2023-03-01 RX ADMIN — TRIAMCINOLONE ACETONIDE: 1 CREAM TOPICAL at 09:32

## 2023-03-01 RX ADMIN — IPRATROPIUM BROMIDE 0.5 MG: 0.5 SOLUTION RESPIRATORY (INHALATION) at 06:30

## 2023-03-01 RX ADMIN — HYDROCODONE BITARTRATE AND ACETAMINOPHEN 1 TABLET: 5; 325 TABLET ORAL at 15:36

## 2023-03-01 RX ADMIN — HYDROCORTISONE SODIUM SUCCINATE 50 MG: 100 INJECTION, POWDER, FOR SOLUTION INTRAMUSCULAR; INTRAVENOUS at 00:12

## 2023-03-01 RX ADMIN — IPRATROPIUM BROMIDE 0.5 MG: 0.5 SOLUTION RESPIRATORY (INHALATION) at 18:48

## 2023-03-01 RX ADMIN — ENOXAPARIN SODIUM 30 MG: 100 INJECTION SUBCUTANEOUS at 09:31

## 2023-03-01 RX ADMIN — ATORVASTATIN CALCIUM 10 MG: 10 TABLET, FILM COATED ORAL at 09:31

## 2023-03-01 RX ADMIN — INSULIN LISPRO 2 UNITS: 100 INJECTION, SOLUTION INTRAVENOUS; SUBCUTANEOUS at 13:05

## 2023-03-01 ASSESSMENT — PAIN SCALES - GENERAL: PAINLEVEL_OUTOF10: 8

## 2023-03-01 ASSESSMENT — PAIN SCALES - WONG BAKER: WONGBAKER_NUMERICALRESPONSE: 0

## 2023-03-01 ASSESSMENT — PAIN DESCRIPTION - LOCATION: LOCATION: FOOT

## 2023-03-01 NOTE — CARE COORDINATION
3/1/23 1536 CM note: COVID (-) 2/24/23. 4L vs FIO2 60% , IV solucortef & rocephin, po zithromax. Soft cast to R ankle. Plan for R ankle ORIF vs external fixation with Dr Dwayne Gotti tomorrow. Per [facility]ust[facility]oz liaison, they will follow to see how pt does after surgery. Updated pts daughter Madison Webster on above. Provided SNF list for back up choices yesterday,Freda states she hasn't reviewed list for other choices yet; encouraged her to do so. She was also interested in [facility] but no beds available at this time. Patient lives at home alone in single story home. She has two children legal nok. Pt has limited DME to wc, cane with arm sling/brace attached she uses if she wants to stand up straight. CM will follow.  Electronically signed by Oziel Robbins RN on 3/1/2023 at 3:54 PM

## 2023-03-01 NOTE — PROGRESS NOTES
Turning patient Q2 hours per \"Save our Skin\" protocol with pillow support, TAP, and wedges. Heels are elevated, left foot has heel teresa placed, right heel currently has soft cast placed.

## 2023-03-01 NOTE — PROGRESS NOTES
Comprehensive Nutrition Assessment    Type and Reason for Visit:  Reassess    Nutrition Recommendations/Plan:   Continue current diet & ONS. Continue to monitor. Malnutrition Assessment:  Malnutrition Status: At risk for malnutrition (Comment) (02/25/23 0907)    Context:  Acute Illness     Findings of the 6 clinical characteristics of malnutrition:  Energy Intake:  No significant decrease in energy intake  Weight Loss:  Unable to assess (d/t lack of wt hx in EMR)     Body Fat Loss:  No significant body fat loss     Muscle Mass Loss:  No significant muscle mass loss    Fluid Accumulation:  No significant fluid accumulation     Strength:  Not Performed    Nutrition Assessment:    Pt admits s/p fall,  Dx; Acute on chronic respiratory failure w/ hypoxia & hypercapnia & R Ankle fracture, note Pemphigus foliaceous & 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 Hx: HTN, Uterine cancer s/p hysterectomy. Pt meal intake is >76 & ONS is ~ 50%. Pt has a wound. Continue current diet & ONS. Continue to monitor. Nutrition Related Findings:    A/O x4, obese/rounded abd +BS, I/Os +2L, no edema noted. Wound Type: Pressure Injury, Multiple, Stage II, Wound Consult Pending, Skin Tears (buttock, rt dorsal)       Current Nutrition Intake & Therapies:    Average Meal Intake: %  Average Supplements Intake: 1-25%, %  ADULT DIET; Regular; Low Fat/Low Chol/High Fiber/2 gm Na  ADULT ORAL NUTRITION SUPPLEMENT; Breakfast, Dinner; Wound Healing Oral Supplement  ADULT ORAL NUTRITION SUPPLEMENT; Lunch, Dinner; Low Calorie/High Protein Oral Supplement  Diet NPO    Anthropometric Measures:  Height: 5' 6\" (167.6 cm)  Ideal Body Weight (IBW): 130 lbs (59 kg)    Admission Body Weight: 241 lb (109.3 kg) (stated 2/24)  Current Body Weight: 272 lb 2 oz (123.4 kg) (2/27 bed), 185.4 % IBW.     Current BMI (kg/m2): 43.9  Usual Body Weight:  (no wt hx in EMR)     Weight Adjustment For: No Adjustment     BMI Categories: Obese Class 2 (BMI 35.0 -39.9)    Estimated Daily Nutrient Needs:  Energy Requirements Based On: Formula  Weight Used for Energy Requirements: Current  Energy (kcal/day): 1944-4956  Weight Used for Protein Requirements: Ideal  Protein (g/day): 100-110 (1.7-1.9 gm/kg)  Method Used for Fluid Requirements: 1 ml/kcal  Fluid (ml/day): 2059-9561    Nutrition Diagnosis:   Increased nutrient needs related to increase demand for energy/nutrients as evidenced by wounds    Nutrition Interventions:   Food and/or Nutrient Delivery: Continue Current Diet, Continue Oral Nutrition Supplement  Nutrition Education/Counseling: No recommendation at this time  Coordination of Nutrition Care: Continue to monitor while inpatient     Goals:     Goals: PO intake 75% or greater     Nutrition Monitoring and Evaluation:   Behavioral-Environmental Outcomes: None Identified  Food/Nutrient Intake Outcomes: Food and Nutrient Intake, Supplement Intake  Physical Signs/Symptoms Outcomes: Biochemical Data, Chewing or Swallowing, GI Status, Fluid Status or Edema, Weight, Skin, Nutrition Focused Physical Findings    Discharge Planning:     Too soon to determine     Saumya Webb RD  Contact: [phone]

## 2023-03-01 NOTE — PROGRESS NOTES
Physical Therapy  Physical Therapy Treatment Note/Plan of Care    Room #:  1829/0890-92  Patient Name: Johan Marie  YOB: 1940  MRN: [id number]    Date of Service: 3/1/2023     Tentative placement recommendation:  [facility] vs [facility]  Equipment recommendation: To be determined      Evaluating Physical Therapist: Valery Kaye PT, DPT #[id number]      Specific Provider Orders/Date/Referring Provider :     02/24/23 0700    PT evaluation and treat  Start:  02/24/23 0700,   End:  02/24/23 0700,   ONE TIME,   Standing Count:  1 Occurrences,   R         Charly Prado MD Acknowledge New     Admitting Diagnosis:   Elevated troponin [R77.8]  Closed fracture of right ankle, initial encounter [S82.891A]  Acute respiratory failure with hypoxia and hypercapnia (HCC) [J96.01, J96.02]  Acute hypercapnic respiratory failure (HCC) [J96.02]  Anemia, unspecified type [D64.9]  Pain of right hip joint [M25.551]      Surgery: none  Visit Diagnoses         Codes    Acute respiratory failure with hypoxia and hypercapnia (HCC)    -  Primary J96.01, J96.02    Closed fracture of right ankle, initial encounter     S82.891A    Anemia, unspecified type     D64.9    Pain of right hip joint     M25.551            Patient Active Problem List   Diagnosis    Acute on chronic respiratory failure with hypoxia and hypercapnia (HCC)    Elevated troponin    Iron deficiency anemia    KIMBERLY (acute kidney injury) ([facility] [address]    Primary hypertension    Pemphigus foliaceous    Acute respiratory failure with hypoxia and hypercapnia (HCC)    Closed fracture of right ankle    Community acquired pneumonia of right upper lobe of lung    Type 2 diabetes mellitus with hyperglycemia, without long-term current use of insulin (HCC)        ASSESSMENT of Current Deficits Patient exhibits decreased strength, balance, and endurance impairing functional mobility, transfers, gait , gait distance, and tolerance to activity.  Pt required Curly for bed mobility but declined standing sitting EOB but was agreeable to sit EOB and perform seated exercises. Pt attempted standing with 2 person assist and was unable to do so with assistance. PHYSICAL THERAPY  PLAN OF CARE       Physical therapy plan of care is established based on physician order,  patient diagnosis and clinical assessment    Current Treatment Recommendations:    -Bed Mobility: Lower extremity exercises  and Trunk control activities   -Sitting Balance: Incorporate reaching activities to activate trunk muscles , Hands on support to maintain midline , Facilitate active trunk muscle engagement , Facilitate postural control in all planes , and Engage in core activities to allow for movement within base of support   -Standing Balance: Perform strengthening exercises in standing to promote motor control with or without upper extremity support , Instruct patient on adequate base of support to maintain balance, and Challenge balance utilizing reaching  activities beyond center of gravity    -Transfers: Provide instruction on proper hand and foot position for adequate transfer of weight onto lower extremities and use of gait device if needed, Cues for hand placement, technique and safety.  Provide stabilization to prevent fall , Facilitate weight shift forward on to lower extremities and provide necessary stabilization of bilateral lower extremities , Support transfer of weight on to lower extremities, and Assist with extension of knees trunk and hip to accept weight transfer   -Gait: Gait training, Standing activities to improve: base of support, weight shift, weight bearing , Exercises to improve trunk control, Exercises to improve hip and knee control, Performance of protected weight bearing activities, and Activities to increase weight bearing   -Endurance: Utilize Supervised activities to increase level of endurance to allow for safe functional mobility including transfers and gait  and Use graduated activities to promote good breathing techniques and provide support and education to maximize respiratory function    PT long term treatment goals are located in below grid    Patient and or family understand(s) diagnosis, prognosis, and plan of care. Frequency of treatments: Patient will be seen  daily. Prior Level of Function: Patient ambulated independently, with cane   Rehab Potential: fair + for baseline    Past medical history:   Past Medical History:   Diagnosis Date    Cancer [facility])     uterus  has hysterectomy    Fracture of right ankle     Hyperlipidemia     Hypertension     Obese      Past Surgical History:   Procedure Laterality Date    APPENDECTOMY      CARPAL TUNNEL RELEASE Right 12/05/2016    right trigger finger middle and ring    CHOLECYSTECTOMY      HYSTERECTOMY (CERVIX STATUS UNKNOWN)         SUBJECTIVE:    Precautions: Up with assistance, falls and AMS, NWB through RLE      Social history: Patient lives alone in a ranch home  with Ramp  to enter home. Walk in shower        Equipment owned: [address] 25, and Bedside commode    AM-[facility] Basic Mobility       AM-PAC Basic Mobility - Inpatient   How much help is needed turning from your back to your side while in a flat bed without using bedrails?: A Little  How much help is needed moving from lying on your back to sitting on the side of a flat bed without using bedrails?: A Little  How much help is needed moving to and from a bed to a chair?: Total  How much help is needed standing up from a chair using your arms?: Total  How much help is needed walking in hospital room?: Total  How much help is needed climbing 3-5 steps with a railing?: Total  AM-PAC Inpatient Mobility Raw Score : 10  AM-PAC Inpatient T-Scale Score : 32.29  Mobility Inpatient CMS 0-100% Score: 76.75  Mobility Inpatient CMS G-Code Modifier : CL    Nursing cleared patient for PT treatment.       OBJECTIVE;   Initial Evaluation  Date: 2/25/2023 Treatment Date:  3/1/2023     Short Term/ Long Term   Goals   Was pt agreeable to Eval/treatment? Yes Y To be met in 3 days   Pain level   0/10   0/10    Bed Mobility    Rolling: Maximal assist of 1    Supine to sit: Maximal assist of 1    Sit to supine: Maximal assist of 1    Scooting: Maximal assist of 1   Rolling: Minimal assist of 1   Supine to sit: Minimal assist of 1   Sit to supine: Minimal assist of 1   Scooting: Minimal assist of 1    Rolling: Minimal assist of 1    Supine to sit: Minimal assist of 1    Sit to supine: Minimal assist of 1    Scooting: Minimal assist of 1     Transfers Sit to stand: Not assessed  pt declined Sit to stand: Not assessed  unable on 2 attempts   Sit to stand: Minimal assist of 1    Ambulation    not assessed     > 15  feet using  wheeled walker with Moderate assist of 1    ROM Within functional limits    Increase range of motion 10% of affected joints    Strength BUE:  refer to OT eval  RLE:  3-/5  LLE:  3-/5  Increase strength in affected mm groups by 1/3 grade   Balance Sitting EOB:  fair   Dynamic Standing:  not assessed  Sitting EOB: fair -  Dynamic Standing: not assessed    Sitting EOB:  fair +  Dynamic Standing: fair with [employer]     Patient is Alert & Oriented x person, place, time, and situation and follows directions    Sensation:  Patient  denies numbness/tingling   Edema:  no   Endurance: poor +    Vitals:  8 liters high flow nasal cannula   Blood Pressure at rest  Blood Pressure during session     Heart Rate at rest  Heart Rate during session    SPO2 at rest %  SPO2 during session 95-98%     Patient education  Patient educated on role of Physical Therapy, risks of immobility, safety and plan of care, energy conservation,  importance of mobility while in hospital , purse lip breathing, importance and purpose of adaptive device and adjusted to proper height for the patient. , safety , and weight bearing status      Patient response to education:   Pt verbalized understanding Pt demonstrated skill Pt requires further education in this area   Yes Partial Yes      Treatment:  Patient practiced and was instructed/facilitated in the following treatment: Patient Sat edge of bed 20 minutes with Curly to increase dynamic sitting balance and activity tolerance. Pt performed bed mobility, sat EOB and performed seated exercises, scooted toward HOB. Therapeutic Exercises:  ankle pumps, heel raises, hip abduction/adduction, long arc quad, and seated marching  x 15 reps     At end of session, patient in bed with alarm call light and phone within reach,  all lines and tubes intact, nursing notified. Patient would benefit from continued skilled Physical Therapy to improve functional independence and quality of life.          Patient's/ family goals   rehab    Time in  65  Time out  1020    Total Treatment Time  41 minutes    CPT codes:    Therapeutic activities (29625)   24 minutes  2 unit(s)  Therapeutic exercises (35176)   17 minutes  1 unit(s)    Estevan Swenson, PT

## 2023-03-01 NOTE — PROGRESS NOTES
Department of Orthopedic Surgery  Resident Progress Note    Patient seen and examined. Pain controlled. No new complaints. Patient is confused this morning she believes she was in a acute rehab facility. Denies chest pain, shortness of breath, dizziness/lightheadedness. VITALS:  BP (!) 143/67   Pulse 82   Temp 98.6 °F (37 °C) (Oral)   Resp 18   Ht 5' 6\" (1.676 m)   Wt 272 lb 2 oz (123.4 kg)   SpO2 95%   BMI 43.92 kg/m²     General: Awake resting comfortably in bed    MUSCULOSKELETAL:   right lower extremity:  Dressing C/D/I, splint intact in place  Palpable compartments soft and compressible  Unable to demonstrate plantarflexion dorsiflexion secondary to splint patient does have great toe extension, this is weak when compared to contralateral side.   +2/4 DP pulses, Brisk Cap refill, Toes warm and perfused  Distal sensation grossly intact to Peroneals, Sural, Saphenous, and tibial nrs    CBC:   Lab Results   Component Value Date/Time    WBC 11.8 02/28/2023 05:30 AM    HGB 8.6 02/28/2023 05:30 AM    HCT 32.8 02/28/2023 05:30 AM     02/28/2023 05:30 AM     PT/INR:  No results found for: PROTIME, INR    ASSESSMENT  Closed, right bimalleolar fracture subluxation  Closed, fibular head fracture    PLAN    Nonweightbearing right lower extremity  Maintain well-padded 3 sided splint    Elevate and ice to reduce swelling and pain  Pain control and DVT prophylaxis per primary team  Discussed with patient the possibility of surgery tomorrow, external fixation versus open reduction internal fixation  will discussed with attending    Zeinab Iglesias DO

## 2023-03-01 NOTE — PROGRESS NOTES
Pulmonary/Critical Care Progress Note    SUMMARY:  Danita Carbajal is a 79 y.o. female  with a history of HTN, HLD, uterine cancer s/p hysterectomy presents with fall. Developed altered mental status and Acute on Chronic Hypercapneic Respiratory Failure in ER and transferred to ICU    ISSUES:    Acute on chronic respiratory failure with hypoxia and hypercapnia (HCC)  Suspect baseline Obesity Hypoventilation Syndrome   Much improved  On NC during day     Encourage BiPAP   Needs to use qhs and PRN    Would likely qualify at this time (note bicarb of 40 consistent with with CO2 retention)  Could have auto-titrating BiPAP +/- outpt sleep eval if possible    Going to the OR for her ankle  Needs to wear BiPAP post-op to ensure does not develop recurrent respiratory failure    Hypotension  Resolved     KIMBERLY (acute kidney injury) ([address]  Resolved     Fall  R Ankle fracture  Splinted - seen by ortho  CT completed    Elevated troponin  Very elevated BNP  Echo done - ?  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)  Wound care team guidance appreciated   Mycostatin      Goals of care 2/24/23  With pt / daughter / grandtr   Pt indicates would agree to intubation if needed    3/1/23  Updated pt and daughter at bedside     ______________________________________________    SUBJECTIVE:  Alert      OBJECTIVE:  Steady improvement  Unclear if actually using BiPAP at night    MEDICATIONS:   insulin lispro  0-8 Units SubCUTAneous TID WC    insulin lispro  0-4 Units SubCUTAneous Nightly    pantoprazole  40 mg Oral QAM AC    azithromycin  500 mg Oral Daily    latanoprost  1 drop Both Eyes Nightly    triamcinolone   Topical BID    sodium chloride flush  5-40 mL IntraVENous 2 times per day    enoxaparin  30 mg SubCUTAneous BID    arformoterol tartrate  15 mcg Nebulization BID    albuterol  2.5 mg Nebulization Q4H WA    And    ipratropium  0.5 mg Nebulization Q4H WA    cefTRIAXone (ROCEPHIN) IV  1,000 mg IntraVENous Q24H    atorvastatin  10 mg Oral Daily    miconazole   Topical BID    sodium chloride flush  5-40 mL IntraVENous 2 times per day    heparin flush  3 mL IntraVENous 2 times per day      dextrose      sodium chloride       glucose, dextrose bolus **OR** dextrose bolus, glucagon (rDNA), dextrose, HYDROcodone 5 mg - acetaminophen, sodium chloride flush, sodium chloride, ondansetron **OR** ondansetron, polyethylene glycol, acetaminophen **OR** acetaminophen, sodium chloride flush, heparin flush      Vitals:    03/01/23 0830   BP:    Pulse:    Resp:    Temp: 98.1 °F (36.7 °C)   SpO2:      FiO2 : 55 %  O2 Flow Rate (L/min): 4 L/min  O2 Device: High flow nasal cannula    PHYSICAL EXAM:  General Appearance: alert, NAD  Cardiovascular: normal rate, regular rhythm, normal S1 and S2, no murmurs, rubs, clicks, or gallops, distal pulses intact,   Pulmonary/Chest: clear to auscultation bilaterally- no wheezes, rales or rhonchi, normal air movement, no respiratory distress  Abdomen: morbidly obese, soft, non-tender, non-distended, normal bowel sounds  Extremities: positive edema, R ankle splint  Skin: diffuse crusted lesions, ++ macerated skin in skin folds, foul smelling   Eyes: pupils equal, round, and reactive to light  Musculoskeletal / Neurological: grossly normal  no clear focal finding, moving 4 limbs         Additional Respiratory Assessments  Heart Rate: 83  Resp: 20  SpO2: 94 %     URINARY CATHETER OUTPUT ([place]):    Urinary Catheter 02/24/23-Output (mL): 625 mL    LABS:    WBC   Date Value Ref Range Status   02/28/2023 11.8 (H) 4.5 - 11.5 E9/L Final   02/27/2023 15.9 (H) 4.5 - 11.5 E9/L Final   02/26/2023 16.9 (H) 4.5 - 11.5 E9/L Final     Hemoglobin   Date Value Ref Range Status   02/28/2023 8.6 (L) 11.5 - 15.5 g/dL Final   02/27/2023 8.8 (L) 11.5 - 15.5 g/dL Final   02/26/2023 8.9 (L) 11.5 - 15.5 g/dL Final     Hematocrit   Date Value Ref Range Status   02/28/2023 32.8 (L) 34.0 - 48.0 % Final   02/27/2023 33.5 (L) 34.0 - 48.0 % Final   02/26/2023 31.7 (L) 34.0 - 48.0 % Final     MCV   Date Value Ref Range Status   02/28/2023 80.6 80.0 - 99.9 fL Final   02/27/2023 81.1 80.0 - 99.9 fL Final   02/26/2023 79.1 (L) 80.0 - 99.9 fL Final     Platelets   Date Value Ref Range Status   02/28/2023 314 130 - 450 E9/L Final   02/27/2023 331 130 - 450 E9/L Final   02/26/2023 297 130 - 450 E9/L Final     Sodium   Date Value Ref Range Status   03/01/2023 135 132 - 146 mmol/L Final   02/28/2023 136 132 - 146 mmol/L Final   02/27/2023 137 132 - 146 mmol/L Final     Potassium   Date Value Ref Range Status   03/01/2023 5.4 (H) 3.5 - 5.0 mmol/L Final   02/28/2023 5.9 (H) 3.5 - 5.0 mmol/L Final     Potassium reflex Magnesium   Date Value Ref Range Status   02/27/2023 5.1 (H) 3.5 - 5.0 mmol/L Final   02/26/2023 4.7 3.5 - 5.0 mmol/L Final   02/25/2023 3.6 3.5 - 5.0 mmol/L Final     Chloride   Date Value Ref Range Status   03/01/2023 95 (L) 98 - 107 mmol/L Final   02/28/2023 95 (L) 98 - 107 mmol/L Final   02/27/2023 95 (L) 98 - 107 mmol/L Final     CO2   Date Value Ref Range Status   03/01/2023 39 (H) 22 - 29 mmol/L Final   02/28/2023 38 (H) 22 - 29 mmol/L Final   02/27/2023 38 (H) 22 - 29 mmol/L Final     BUN   Date Value Ref Range Status   03/01/2023 31 (H) 6 - 23 mg/dL Final   02/28/2023 33 (H) 6 - 23 mg/dL Final   02/27/2023 30 (H) 6 - 23 mg/dL Final     Creatinine   Date Value Ref Range Status   03/01/2023 0.5 0.5 - 1.0 mg/dL Final   02/28/2023 0.6 0.5 - 1.0 mg/dL Final   02/27/2023 0.6 0.5 - 1.0 mg/dL Final     Glucose   Date Value Ref Range Status   03/01/2023 146 (H) 74 - 99 mg/dL Final   02/28/2023 190 (H) 74 - 99 mg/dL Final   02/27/2023 206 (H) 74 - 99 mg/dL Final   03/02/2012 92 70 - 110 mg/dL Final     Calcium   Date Value Ref Range Status   03/01/2023 9.0 8.6 - 10.2 mg/dL Final 02/28/2023 8.7 8.6 - 10.2 mg/dL Final   02/27/2023 8.3 (L) 8.6 - 10.2 mg/dL Final     Total Protein   Date Value Ref Range Status   02/28/2023 5.6 (L) 6.4 - 8.3 g/dL Final   02/27/2023 5.8 (L) 6.4 - 8.3 g/dL Final   02/26/2023 5.8 (L) 6.4 - 8.3 g/dL Final     Albumin   Date Value Ref Range Status   02/28/2023 2.9 (L) 3.5 - 5.2 g/dL Final   02/27/2023 2.7 (L) 3.5 - 5.2 g/dL Final   02/26/2023 2.7 (L) 3.5 - 5.2 g/dL Final   03/02/2012 4.3 3.2 - 4.8 g/dL Final     Total Bilirubin   Date Value Ref Range Status   02/28/2023 0.4 0.0 - 1.2 mg/dL Final   02/27/2023 0.3 0.0 - 1.2 mg/dL Final   02/26/2023 0.4 0.0 - 1.2 mg/dL Final     Alkaline Phosphatase   Date Value Ref Range Status   02/28/2023 62 35 - 104 U/L Final   02/27/2023 87 35 - 104 U/L Final   02/26/2023 63 35 - 104 U/L Final     AST   Date Value Ref Range Status   02/28/2023 27 0 - 31 U/L Final   02/27/2023 30 0 - 31 U/L Final   02/26/2023 16 0 - 31 U/L Final     ALT   Date Value Ref Range Status   02/28/2023 16 0 - 32 U/L Final   02/27/2023 14 0 - 32 U/L Final   02/26/2023 8 0 - 32 U/L Final     Est, Glom Filt Rate   Date Value Ref Range Status   03/01/2023 >60 >=60 mL/min/1.73 Final     Comment:     Pediatric calculator link  [email or web address]. [email or web address]/gfr_calculatorped  Effective Oct 3, 2022  These results are not intended for use in patients  <25years of age. eGFR results are calculated without  a race factor using the 2021 CKD-EPI equation. Careful  clinical correlation is recommended, particularly when  comparing to results calculated using previous equations. The CKD-EPI equation is less accurate in patients with  extremes of muscle mass, extra-renal metabolism of  creatinine, excessive creatinine ingestion, or following  therapy that affects renal tubular secretion. 02/28/2023 >60 >=60 mL/min/1.73 Final     Comment:     Pediatric calculator link  [email or web address]. [email or web address]/gfr_calculatorped  Effective Oct 3, 2022  These results are not intended for use in patients  <25years of age. eGFR results are calculated without  a race factor using the 2021 CKD-EPI equation. Careful  clinical correlation is recommended, particularly when  comparing to results calculated using previous equations. The CKD-EPI equation is less accurate in patients with  extremes of muscle mass, extra-renal metabolism of  creatinine, excessive creatinine ingestion, or following  therapy that affects renal tubular secretion. 02/27/2023 >60 >=60 mL/min/1.73 Final     Comment:     Pediatric calculator link  [email or web address]. [email or web address]/gfr_calculatorped  Effective Oct 3, 2022  These results are not intended for use in patients  <25years of age. eGFR results are calculated without  a race factor using the 2021 CKD-EPI equation. Careful  clinical correlation is recommended, particularly when  comparing to results calculated using previous equations. The CKD-EPI equation is less accurate in patients with  extremes of muscle mass, extra-renal metabolism of  creatinine, excessive creatinine ingestion, or following  therapy that affects renal tubular secretion. GFR    Date Value Ref Range Status   04/22/2021 >60  Final   01/14/2021 >60  Final   09/03/2020 >60  Final     No results found for: MG  Phosphorus   Date Value Ref Range Status   02/27/2023 2.0 (L) 2.5 - 4.5 mg/dL Final   02/26/2023 3.3 2.5 - 4.5 mg/dL Final   02/25/2023 2.0 (L) 2.5 - 4.5 mg/dL Final     No results for input(s): PH, PO2, PCO2, HCO3, BE, O2SAT in the last 72 hours. No results for input(s): CULTRESP in the last 72 hours.     Lab Results   Component Value Date/Time    PH 7.456 02/24/2023 12:46 PM    PH 7.193 02/24/2023 12:13 PM    PH 7.206 02/24/2023 09:49 AM    PH 7.168 02/24/2023 08:18 AM    PO2 115.5 02/24/2023 12:46 PM    PO2 71.1 02/24/2023 12:13 PM    PO2 138.1 02/24/2023 09:49 AM    PO2 76.0 02/24/2023 08:18 AM    PCO2 52.2 02/24/2023 12:46 PM    PCO2 105.4 02/24/2023 12:13 PM    PCO2 98.4 02/24/2023 09:49 AM    PCO2 117.6 02/24/2023 08:18 AM    HCO3 32.3 02/25/2023 05:07 AM    HCO3 36.0 02/24/2023 12:46 PM    HCO3 39.6 02/24/2023 12:13 PM    HCO3 38.1 02/24/2023 09:49 AM    O2SAT 96.7 02/25/2023 05:07 AM    O2SAT 98.1 02/24/2023 12:46 PM    O2SAT 87.9 02/24/2023 12:13 PM    O2SAT 97.9 02/24/2023 09:49 AM       RADIOLOGY:  CT ANKLE RIGHT WO CONTRAST   Final Result   Medial and posterior malleoli mildly displaced fractures with anterior   subluxation of the tibiotalar joint. XR HIP RIGHT (2-3 VIEWS)   Final Result   No acute displaced fractures         XR TIBIA FIBULA RIGHT (2 VIEWS)   Final Result   Right fibular neck fracture and questionable lateral tibial plateau fracture   for which noncontrast CT could be considered. Fracture dislocation at the   ankle with restoration of ankle and Lucien E post reduction. Fractures at the   posterior and medial malleoli. XR KNEE RIGHT (1-2 VIEWS)   Final Result   Right fibular neck fracture and questionable lateral tibial plateau fracture   for which noncontrast CT could be considered. Fracture dislocation at the   ankle with restoration of ankle and Lucien E post reduction. Fractures at the   posterior and medial malleoli. XR ANKLE RIGHT (MIN 3 VIEWS)   Final Result   Right fibular neck fracture and questionable lateral tibial plateau fracture   for which noncontrast CT could be considered. Fracture dislocation at the   ankle with restoration of ankle and Lucien E post reduction. Fractures at the   posterior and medial malleoli. XR ANKLE RIGHT (MIN 3 VIEWS)   Final Result   Medial malleolar fracture with subluxation at the ankle manifest as anterior   widening of the ankle joint. Questionable fracture at the posterior   malleolus. See recommendations above. Heel spurs. XR CHEST 1 VIEW   Final Result   No active process allowing for rightward rotation. (Independently reviewed by me)      Snehal Curiel M.D  Pulmonary & Critical Care  3/1/2023 11:04 AM

## 2023-03-01 NOTE — PROGRESS NOTES
attempted to meet with patient who was with DR. Stringer met with child and offered a listening presence, empathy,  and prayer.   Spiritual care is ongoing and as needed

## 2023-03-01 NOTE — PROGRESS NOTES
[facility] Hospitalist   ICU Progress Note    Admitting Date and Time: 2/24/2023  4:38 AM  Admit Dx: Elevated troponin [R77.8]  Closed fracture of right ankle, initial encounter [S82.043S]  Acute respiratory failure with hypoxia and hypercapnia (HCC) [J96.01, J96.02]  Acute hypercapnic respiratory failure (HCC) [J96.02]  Anemia, unspecified type [D64.9]  Pain of right hip joint [M25.551]    Subjective/interval history:    2/25: Patient was able to avoid intubation with Bipap. She wore most of yesterday and overnight. She states she is weak. She remembers trying to take dog out to bathroom and got tripped up with door opening and fell. Per RN, Levophed weaned down to 4 mcg/min. Currently on 10 L HFNC. Seen by wound nurse yesterday. 2/26: Patient sitting up in bed. She is feeling better and asking    Per RN:  weaned off Levophed yesterday afternoon. Oxygen down to 6L today. [facility]    2/27: Patient sitting up in bed. Her daughter is present at bedside. Has right ankle pain, but well controlled with as needed analgesics. Still on stress dose IV steroids. 2/28: Patient sitting up in bed, her daughter is at bedside. States she had a rough morning today. RN note reviewed and she had an episode of desaturation and was placed back on BiPAP. Feels better now, breathing comfortably. During encounter was on 5.5 L nasal cannula, I decrease this to 5 L as oxygen saturation in the high 90s. 3/1: Patient sitting up in bed, sleeping but awakens easily to voice. Pain well controlled. Tentatively scheduled for surgical repair of right ankle fracture tomorrow.         insulin lispro  0-8 Units SubCUTAneous TID WC    insulin lispro  0-4 Units SubCUTAneous Nightly    pantoprazole  40 mg Oral QAM AC    azithromycin  500 mg Oral Daily    latanoprost  1 drop Both Eyes Nightly    triamcinolone   Topical BID    sodium chloride flush  5-40 mL IntraVENous 2 times per day enoxaparin  30 mg SubCUTAneous BID    arformoterol tartrate  15 mcg Nebulization BID    albuterol  2.5 mg Nebulization Q4H WA    And    ipratropium  0.5 mg Nebulization Q4H WA    cefTRIAXone (ROCEPHIN) IV  1,000 mg IntraVENous Q24H    atorvastatin  10 mg Oral Daily    miconazole   Topical BID    sodium chloride flush  5-40 mL IntraVENous 2 times per day    heparin flush  3 mL IntraVENous 2 times per day     glucose, 4 tablet, PRN  dextrose bolus, 125 mL, PRN   Or  dextrose bolus, 250 mL, PRN  glucagon (rDNA), 1 mg, PRN  dextrose, , Continuous PRN  HYDROcodone 5 mg - acetaminophen, 1 tablet, Q6H PRN  sodium chloride flush, 5-40 mL, PRN  sodium chloride, , PRN  ondansetron, 4 mg, Q8H PRN   Or  ondansetron, 4 mg, Q6H PRN  polyethylene glycol, 17 g, Daily PRN  acetaminophen, 650 mg, Q6H PRN   Or  acetaminophen, 650 mg, Q6H PRN  sodium chloride flush, 5-40 mL, PRN  heparin flush, 3 mL, PRN       Objective:    BP (!) 141/65   Pulse 83   Temp 98.6 °F (37 °C) (Oral)   Resp 20   Ht 5' 6\" (1.676 m)   Wt 272 lb 2 oz (123.4 kg)   SpO2 94%   BMI 43.92 kg/m²   General Appearance: alert and oriented to person, place and time and in no acute distress. Skin: multiple scabs off arms and legs as well as bod  Head: normocephalic and atraumatic  Eyes: pupils equal, round, and reactive to light, extraocular eye movements intact, conjunctivae normal  Neck: neck supple and non tender without mass   Pulmonary/Chest: Nonlabored on 4 L nasal cannula. Diminished but otherwise clear to auscultation bilaterally without crackles, rhonchi, or wheezes  Cardiovascular: normal rate, normal S1 and S2 and no carotid bruits  Abdomen: Obese, soft, obese, non-tender, non-distended, normal bowel sounds, no masses or organomegaly  Extremities: Right lower extremity in soft splint. Mild left lower extremity edema. No left calf tenderness. No cyanosis or clubbing.   Brisk capillary refill to toes bilaterally  Neurologic: no cranial nerve deficit and speech normal      Recent Labs     02/27/23 0442 02/28/23  0530 03/01/23  0618    136 135   K 5.1* 5.9* 5.4*   CL 95* 95* 95*   CO2 38* 38* 39*   BUN 30* 33* 31*   CREATININE 0.6 0.6 0.5   GLUCOSE 206* 190* 146*   CALCIUM 8.3* 8.7 9.0         Recent Labs     02/27/23 0442 02/28/23  0530   ALKPHOS 87 62   PROT 5.8* 5.6*   LABALBU 2.7* 2.9*   BILITOT 0.3 0.4   AST 30 27   ALT 14 16         Recent Labs     02/27/23 0442 02/28/23  0530   WBC 15.9* 11.8*   RBC 4.13 4.07   HGB 8.8* 8.6*   HCT 33.5* 32.8*   MCV 81.1 80.6   MCH 21.3* 21.1*   MCHC 26.3* 26.2*   RDW 18.8* 18.9*    314   MPV 11.9 11.1        Latest Reference Range & Units 2/24/23 05:36 2/24/23 08:02   Pro-BNP 0 - 450 pg/mL 18,200 (H)    Troponin, High Sensitivity 0 - 9 ng/L 66 (H) 51 (H)   (H): Data is abnormally high     Latest Reference Range & Units 2/24/23 05:22 2/24/23 08:18 2/24/23 09:49 2/24/23 12:13 2/24/23 12:46   Source:   Blood Arterial Blood Arterial Blood Arterial Blood Arterial   pH, Blood Gas 7.350 - 7.450   7.168 (LL) 7.206 (LL) 7.193 (LL) 7. 456 (H)   PH (TEMPERATURE CORRECTED) 7.350 - 7.450  7. 182 (LL)       PCO2 35.0 - 45.0 mmHg  117.6 (HH) 98.4 (HH) 105.4 (HH) 52.2 (H)   PCO2 (TEMP CORRECTED) 35.0 - 45.0 mmHg 90.9 (HH)       pO2 75.0 - 100.0 mmHg  76.0 138.1 (H) 71.1 (L) 115.5 (H)   PO2 (TEMP CORRECTED) 60.0 - 80.0 mmHg 104.3 (H)       HCO3 22.0 - 26.0 mmol/L 34.1 (H) 41.7 (H) 38.1 (H) 39.6 (H) 36.0 (H)   Base Excess -3.0 - 3.0 mmol/L 2.4 9.2 (H) 7.0 (H) 8.3 (H) 10.5 (H)   O2 Sat 92.0 - 98.5 % 95.7 89.1 (L) 97.9 87.9 (L) 98.1   THB,THB 11.5 - 16.5 g/dL  12.2 12.1 11.4 (L) 11.3 (L)   O2Hb 94.0 - 97.0 %  88.0 (L) 96.6 87.0 (L) 97.3 (H)   Carboxy-Hgb 0.0 - 1.5 %  0.7 0.7 0.5 0.3   METHB,METHB 0.0 - 1.5 %  0.5 0.6 0.5 0.5   HHb 0.0 - 5.0 %  10.8 (H) 2.1 12.0 (H) 1.9   O2 Content mL/dL  15.2 16.7 14.0 15.6   AaDO2 mmHg  315.1 451. 5 368.6 380.1   RI(T)   4.15 3.27 5.18 3.29   FIO2 % 100.0 75.0 100.0 80.0 80.0   PO2/FIO2 mmHg/% 1. 01 1.38 0.89 1.44   (LL): Data is critically low  [facility]): Data is critically high  (H): Data is abnormally high  (L): Data is abnormally low    Radiology:   CT ANKLE RIGHT WO CONTRAST   Final Result   Medial and posterior malleoli mildly displaced fractures with anterior   subluxation of the tibiotalar joint. XR HIP RIGHT (2-3 VIEWS)   Final Result   No acute displaced fractures         XR TIBIA FIBULA RIGHT (2 VIEWS)   Final Result   Right fibular neck fracture and questionable lateral tibial plateau fracture   for which noncontrast CT could be considered. Fracture dislocation at the   ankle with restoration of ankle and Lucien E post reduction. Fractures at the   posterior and medial malleoli. XR KNEE RIGHT (1-2 VIEWS)   Final Result   Right fibular neck fracture and questionable lateral tibial plateau fracture   for which noncontrast CT could be considered. Fracture dislocation at the   ankle with restoration of ankle and Lucien E post reduction. Fractures at the   posterior and medial malleoli. XR ANKLE RIGHT (MIN 3 VIEWS)   Final Result   Right fibular neck fracture and questionable lateral tibial plateau fracture   for which noncontrast CT could be considered. Fracture dislocation at the   ankle with restoration of ankle and Lucien E post reduction. Fractures at the   posterior and medial malleoli. XR ANKLE RIGHT (MIN 3 VIEWS)   Final Result   Medial malleolar fracture with subluxation at the ankle manifest as anterior   widening of the ankle joint. Questionable fracture at the posterior   malleolus. See recommendations above. Heel spurs. XR CHEST 1 VIEW   Final Result   No active process allowing for rightward rotation. TTE procedure:Echo Complete W/Doppler & Color Flow. Procedure Date  Date: 02/24/2023 Start: 10:24 AM     Study Location: Portable  Technical Quality: Poor visualization due to body habitus. Indications:Abnormal cardiac enzymes. Patient Status: Routine     Contrast Medium: Definity. Height: 66 inches Weight: 241 pounds BSA: 2.17 m^2 BMI: 38.9 kg/m^2     BP: 106/54 mmHg      Findings      Left Ventricle   Ejection fraction is visually estimated at 60%. No regional wall motion   abnormalities seen. Normal left ventricular wall thickness. Left ventricle   size is normal. Indeterminate diastolic function. Assessment and Plan:  Principal Problem:    Acute on chronic respiratory failure with hypoxia and hypercapnia (HCC)  Active Problems:    Elevated troponin    Iron deficiency anemia    KIMBERLY (acute kidney injury) ([facility] [address]    Primary hypertension    Pemphigus foliaceous    Acute respiratory failure with hypoxia and hypercapnia (HCC)    Closed fracture of right ankle    Community acquired pneumonia of right upper lobe of lung    Type 2 diabetes mellitus with hyperglycemia, without long-term current use of insulin (HCC)  Resolved Problems:    * No resolved hospital problems. *          Acute on chronic hypercapnic and hypoxic respiratory failure   -Pulmonary highly suspects patient has baseline obesity hypoventilation syndrome.  -Intubation was able to be avoided with the use of BiPAP. Continued use of BiPAP at bedtime and as needed during daytime nap  -Now down to 4 L nasal cannula  -Case discussed with pulmonary Dr. Shelley Sherman. Patient will need to be on continuous BiPAP for least a few hours postoperatively tomorrow 3/2    2. Acute hypotension  -septic shock versus adrenal insufficiency as on prolong outpatient steroid therapy. Hydrocortisone being weaned down, and will discontinue today  -We will complete 7 days of ceftriaxone today 3/1, will complete 7 days of oral azithromycin on 3/4  -PICC line placed R basilic vein 5/30 as patient had poor access and needs pressors. -weaned off Levophed and now off of IV fluids. Blood pressure has been stable over the last several days    3.  KIMBERLY (resolved)  -creatinine trend 1.2-->1.0-->0.9-->0.6-->0.6-->0.5    4. Elevated troponin/elevate BNP  -Echo done 2/24 shows EF 60% with indeterminate diastolic function  -elevated troponin likely demand ischemia from acute respiratory distress on admission. 5. Pemiphigus foliaceous/severe candidal skin disease  -topical  triamcinolone 0.1% cream to rash ( but not skin folds)  -miconazole 2% nitrate powder bid. 6. Primary hypertension  -home meds on hold     7. Right ankle fracture s/p fall  -Tentatively scheduled for surgical repair 3/2    8. Type II diabetes mellitus  -Hemoglobin A1c 6.7, random blood glucose as high as 353. Consistent with new diagnosis of type 2 diabetes mellitus. Blood glucose elevation in this acute setting is higher than would be expected given her A1c, likely reflection of steroid-induced hyperglycemia. We will use corrective scale insulin for now while inpatient, recommended diet and lifestyle modifications on discharge    9. Disposition  -Plan for subacute rehab after surgical fixation      NOTE: This report was transcribed using voice recognition software. Every effort was made to ensure accuracy; however, inadvertent computerized transcription errors may be present.      Electronically signed by Al Lee DO on 3/1/2023 at 8:23 AM

## 2023-03-02 ENCOUNTER — APPOINTMENT (OUTPATIENT)
Dept: GENERAL RADIOLOGY | Age: 83
DRG: 981 | End: 2023-03-02
Payer: MEDICARE

## 2023-03-02 ENCOUNTER — ANESTHESIA (OUTPATIENT)
Dept: OPERATING ROOM | Age: 83
End: 2023-03-02
Payer: MEDICARE

## 2023-03-02 LAB
AADO2: 227.2 MMHG
ANION GAP SERPL CALCULATED.3IONS-SCNC: 2 MMOL/L (ref 7–16)
ANION GAP SERPL CALCULATED.3IONS-SCNC: 3 MMOL/L (ref 7–16)
ANISOCYTOSIS: ABNORMAL
B.E.: 9.7 MMOL/L (ref -3–3)
BASOPHILS ABSOLUTE: 0.01 E9/L (ref 0–0.2)
BASOPHILS RELATIVE PERCENT: 0.1 % (ref 0–2)
BLOOD CULTURE, ROUTINE: NORMAL
BUN BLDV-MCNC: 21 MG/DL (ref 6–23)
BUN BLDV-MCNC: 22 MG/DL (ref 6–23)
CALCIUM SERPL-MCNC: 7.3 MG/DL (ref 8.6–10.2)
CALCIUM SERPL-MCNC: 8.7 MG/DL (ref 8.6–10.2)
CHLORIDE BLD-SCNC: 94 MMOL/L (ref 98–107)
CHLORIDE BLD-SCNC: 95 MMOL/L (ref 98–107)
CO2: 39 MMOL/L (ref 22–29)
CO2: 41 MMOL/L (ref 22–29)
COHB: 0.5 % (ref 0–1.5)
CREAT SERPL-MCNC: 0.4 MG/DL (ref 0.5–1)
CREAT SERPL-MCNC: 0.4 MG/DL (ref 0.5–1)
CRITICAL: ABNORMAL
CULTURE, BLOOD 2: NORMAL
DATE ANALYZED: ABNORMAL
DATE OF COLLECTION: ABNORMAL
EKG ATRIAL RATE: 86 BPM
EKG P AXIS: 40 DEGREES
EKG P-R INTERVAL: 146 MS
EKG Q-T INTERVAL: 358 MS
EKG QRS DURATION: 94 MS
EKG QTC CALCULATION (BAZETT): 428 MS
EKG R AXIS: 40 DEGREES
EKG T AXIS: 37 DEGREES
EKG VENTRICULAR RATE: 86 BPM
EOSINOPHILS ABSOLUTE: 0.22 E9/L (ref 0.05–0.5)
EOSINOPHILS RELATIVE PERCENT: 2.2 % (ref 0–6)
FIO2: 55 %
GFR SERPL CREATININE-BSD FRML MDRD: >60 ML/MIN/1.73
GFR SERPL CREATININE-BSD FRML MDRD: >60 ML/MIN/1.73
GLUCOSE BLD-MCNC: 106 MG/DL (ref 74–99)
GLUCOSE BLD-MCNC: 111 MG/DL (ref 74–99)
HCO3: 36.4 MMOL/L (ref 22–26)
HCT VFR BLD CALC: 32.8 % (ref 34–48)
HEMOGLOBIN: 8.7 G/DL (ref 11.5–15.5)
HHB: 4.6 % (ref 0–5)
HYPOCHROMIA: ABNORMAL
IMMATURE GRANULOCYTES #: 0.06 E9/L
IMMATURE GRANULOCYTES %: 0.6 % (ref 0–5)
LAB: ABNORMAL
LYMPHOCYTES ABSOLUTE: 2.35 E9/L (ref 1.5–4)
LYMPHOCYTES RELATIVE PERCENT: 23 % (ref 20–42)
Lab: ABNORMAL
MAGNESIUM: 1.4 MG/DL (ref 1.6–2.6)
MCH RBC QN AUTO: 21.4 PG (ref 26–35)
MCHC RBC AUTO-ENTMCNC: 26.5 % (ref 32–34.5)
MCV RBC AUTO: 80.6 FL (ref 80–99.9)
METER GLUCOSE: 103 MG/DL (ref 74–99)
METER GLUCOSE: 114 MG/DL (ref 74–99)
METER GLUCOSE: 93 MG/DL (ref 74–99)
METER GLUCOSE: 96 MG/DL (ref 74–99)
METHB: 0.5 % (ref 0–1.5)
MODE: ABNORMAL
MONOCYTES ABSOLUTE: 0.84 E9/L (ref 0.1–0.95)
MONOCYTES RELATIVE PERCENT: 8.2 % (ref 2–12)
NEUTROPHILS ABSOLUTE: 6.72 E9/L (ref 1.8–7.3)
NEUTROPHILS RELATIVE PERCENT: 65.9 % (ref 43–80)
O2 CONTENT: 14.2 ML/DL
O2 SATURATION: 95.4 % (ref 92–98.5)
O2HB: 94.4 % (ref 94–97)
OPERATOR ID: ABNORMAL
OVALOCYTES: ABNORMAL
PATIENT TEMP: 37 C
PCO2: 62 MMHG (ref 35–45)
PDW BLD-RTO: 19.4 FL (ref 11.5–15)
PEEP/CPAP: 6 CMH2O
PFO2: 1.49 MMHG/%
PH BLOOD GAS: 7.39 (ref 7.35–7.45)
PIP: 20 CMH2O
PLATELET # BLD: 353 E9/L (ref 130–450)
PMV BLD AUTO: 11.2 FL (ref 7–12)
PO2: 82.2 MMHG (ref 75–100)
POIKILOCYTES: ABNORMAL
POLYCHROMASIA: ABNORMAL
POTASSIUM SERPL-SCNC: 5 MMOL/L (ref 3.5–5)
POTASSIUM SERPL-SCNC: 6.8 MMOL/L (ref 3.5–5)
RBC # BLD: 4.07 E12/L (ref 3.5–5.5)
RI(T): 2.76
SODIUM BLD-SCNC: 136 MMOL/L (ref 132–146)
SODIUM BLD-SCNC: 138 MMOL/L (ref 132–146)
SOURCE, BLOOD GAS: ABNORMAL
SPHEROCYTES: ABNORMAL
STOMATOCYTES: ABNORMAL
TARGET CELLS: ABNORMAL
THB: 10.6 G/DL (ref 11.5–16.5)
TIME ANALYZED: 2325
WBC # BLD: 10.2 E9/L (ref 4.5–11.5)

## 2023-03-02 PROCEDURE — 94640 AIRWAY INHALATION TREATMENT: CPT

## 2023-03-02 PROCEDURE — 6360000002 HC RX W HCPCS: Performed by: INTERNAL MEDICINE

## 2023-03-02 PROCEDURE — C1713 ANCHOR/SCREW BN/BN,TIS/BN: HCPCS | Performed by: ORTHOPAEDIC SURGERY

## 2023-03-02 PROCEDURE — 2580000003 HC RX 258: Performed by: INTERNAL MEDICINE

## 2023-03-02 PROCEDURE — 93005 ELECTROCARDIOGRAM TRACING: CPT | Performed by: INTERNAL MEDICINE

## 2023-03-02 PROCEDURE — 99232 SBSQ HOSP IP/OBS MODERATE 35: CPT | Performed by: INTERNAL MEDICINE

## 2023-03-02 PROCEDURE — 6370000000 HC RX 637 (ALT 250 FOR IP): Performed by: INTERNAL MEDICINE

## 2023-03-02 PROCEDURE — 80048 BASIC METABOLIC PNL TOTAL CA: CPT

## 2023-03-02 PROCEDURE — 82962 GLUCOSE BLOOD TEST: CPT

## 2023-03-02 PROCEDURE — 2580000003 HC RX 258: Performed by: NURSE ANESTHETIST, CERTIFIED REGISTERED

## 2023-03-02 PROCEDURE — 7100000001 HC PACU RECOVERY - ADDTL 15 MIN: Performed by: ORTHOPAEDIC SURGERY

## 2023-03-02 PROCEDURE — 82805 BLOOD GASES W/O2 SATURATION: CPT

## 2023-03-02 PROCEDURE — 0QSG04Z REPOSITION RIGHT TIBIA WITH INTERNAL FIXATION DEVICE, OPEN APPROACH: ICD-10-PCS | Performed by: ORTHOPAEDIC SURGERY

## 2023-03-02 PROCEDURE — 6360000002 HC RX W HCPCS: Performed by: NURSE ANESTHETIST, CERTIFIED REGISTERED

## 2023-03-02 PROCEDURE — 73610 X-RAY EXAM OF ANKLE: CPT

## 2023-03-02 PROCEDURE — 2060000000 HC ICU INTERMEDIATE R&B

## 2023-03-02 PROCEDURE — 6370000000 HC RX 637 (ALT 250 FOR IP)

## 2023-03-02 PROCEDURE — 36415 COLL VENOUS BLD VENIPUNCTURE: CPT

## 2023-03-02 PROCEDURE — 93010 ELECTROCARDIOGRAM REPORT: CPT | Performed by: INTERNAL MEDICINE

## 2023-03-02 PROCEDURE — 2720000010 HC SURG SUPPLY STERILE: Performed by: ORTHOPAEDIC SURGERY

## 2023-03-02 PROCEDURE — 3700000000 HC ANESTHESIA ATTENDED CARE: Performed by: ORTHOPAEDIC SURGERY

## 2023-03-02 PROCEDURE — 6360000002 HC RX W HCPCS

## 2023-03-02 PROCEDURE — 7100000000 HC PACU RECOVERY - FIRST 15 MIN: Performed by: ORTHOPAEDIC SURGERY

## 2023-03-02 PROCEDURE — 85025 COMPLETE CBC W/AUTO DIFF WBC: CPT

## 2023-03-02 PROCEDURE — C1769 GUIDE WIRE: HCPCS | Performed by: ORTHOPAEDIC SURGERY

## 2023-03-02 PROCEDURE — 3600000004 HC SURGERY LEVEL 4 BASE: Performed by: ORTHOPAEDIC SURGERY

## 2023-03-02 PROCEDURE — 2709999900 HC NON-CHARGEABLE SUPPLY: Performed by: ORTHOPAEDIC SURGERY

## 2023-03-02 PROCEDURE — 3700000001 HC ADD 15 MINUTES (ANESTHESIA): Performed by: ORTHOPAEDIC SURGERY

## 2023-03-02 PROCEDURE — 2500000003 HC RX 250 WO HCPCS: Performed by: NURSE ANESTHETIST, CERTIFIED REGISTERED

## 2023-03-02 PROCEDURE — 6370000000 HC RX 637 (ALT 250 FOR IP): Performed by: NURSE ANESTHETIST, CERTIFIED REGISTERED

## 2023-03-02 PROCEDURE — 3600000014 HC SURGERY LEVEL 4 ADDTL 15MIN: Performed by: ORTHOPAEDIC SURGERY

## 2023-03-02 PROCEDURE — 83735 ASSAY OF MAGNESIUM: CPT

## 2023-03-02 PROCEDURE — 2700000000 HC OXYGEN THERAPY PER DAY

## 2023-03-02 PROCEDURE — 94660 CPAP INITIATION&MGMT: CPT

## 2023-03-02 PROCEDURE — 3209999900 FLUORO FOR SURGICAL PROCEDURES

## 2023-03-02 DEVICE — IMPLANTABLE DEVICE: Type: IMPLANTABLE DEVICE | Site: ANKLE | Status: FUNCTIONAL

## 2023-03-02 DEVICE — SCREW BNE L50MM DIA3.5MM CORT S STL ST NONCANNULATED LOK: Type: IMPLANTABLE DEVICE | Site: ANKLE | Status: FUNCTIONAL

## 2023-03-02 DEVICE — SCREW BNE L44MM DIA4MM S STL CANN LNG HALF THRD SM HEX SOCK: Type: IMPLANTABLE DEVICE | Site: ANKLE | Status: FUNCTIONAL

## 2023-03-02 DEVICE — SCREW BNE L60MM DIA3.5MM CORT S STL ST NONCANNULATED LOK: Type: IMPLANTABLE DEVICE | Site: ANKLE | Status: FUNCTIONAL

## 2023-03-02 RX ORDER — ROCURONIUM BROMIDE 10 MG/ML
INJECTION, SOLUTION INTRAVENOUS PRN
Status: DISCONTINUED | OUTPATIENT
Start: 2023-03-02 | End: 2023-03-02 | Stop reason: SDUPTHER

## 2023-03-02 RX ORDER — LIDOCAINE HYDROCHLORIDE 20 MG/ML
INJECTION, SOLUTION INTRAVENOUS PRN
Status: DISCONTINUED | OUTPATIENT
Start: 2023-03-02 | End: 2023-03-02 | Stop reason: SDUPTHER

## 2023-03-02 RX ORDER — KETOROLAC TROMETHAMINE 15 MG/ML
15 INJECTION, SOLUTION INTRAMUSCULAR; INTRAVENOUS
Status: COMPLETED | OUTPATIENT
Start: 2023-03-02 | End: 2023-03-02

## 2023-03-02 RX ORDER — CEFAZOLIN SODIUM 1 G/3ML
INJECTION, POWDER, FOR SOLUTION INTRAMUSCULAR; INTRAVENOUS PRN
Status: DISCONTINUED | OUTPATIENT
Start: 2023-03-02 | End: 2023-03-02 | Stop reason: SDUPTHER

## 2023-03-02 RX ORDER — IPRATROPIUM BROMIDE AND ALBUTEROL SULFATE 2.5; .5 MG/3ML; MG/3ML
1 SOLUTION RESPIRATORY (INHALATION)
Status: DISCONTINUED | OUTPATIENT
Start: 2023-03-02 | End: 2023-03-02 | Stop reason: HOSPADM

## 2023-03-02 RX ORDER — ENOXAPARIN SODIUM 100 MG/ML
30 INJECTION SUBCUTANEOUS 2 TIMES DAILY
Status: DISCONTINUED | OUTPATIENT
Start: 2023-03-03 | End: 2023-03-06 | Stop reason: HOSPADM

## 2023-03-02 RX ORDER — MEPERIDINE HYDROCHLORIDE 25 MG/ML
12.5 INJECTION INTRAMUSCULAR; INTRAVENOUS; SUBCUTANEOUS EVERY 5 MIN PRN
Status: DISCONTINUED | OUTPATIENT
Start: 2023-03-02 | End: 2023-03-02 | Stop reason: HOSPADM

## 2023-03-02 RX ORDER — PROPOFOL 10 MG/ML
INJECTION, EMULSION INTRAVENOUS PRN
Status: DISCONTINUED | OUTPATIENT
Start: 2023-03-02 | End: 2023-03-02 | Stop reason: SDUPTHER

## 2023-03-02 RX ORDER — HYDRALAZINE HYDROCHLORIDE 20 MG/ML
10 INJECTION INTRAMUSCULAR; INTRAVENOUS
Status: DISCONTINUED | OUTPATIENT
Start: 2023-03-02 | End: 2023-03-02 | Stop reason: HOSPADM

## 2023-03-02 RX ORDER — MIDAZOLAM HYDROCHLORIDE 1 MG/ML
2 INJECTION INTRAMUSCULAR; INTRAVENOUS
Status: DISCONTINUED | OUTPATIENT
Start: 2023-03-02 | End: 2023-03-02 | Stop reason: HOSPADM

## 2023-03-02 RX ORDER — WATER 1000 ML/1000ML
INJECTION, SOLUTION INTRAVENOUS
Status: DISPENSED
Start: 2023-03-02 | End: 2023-03-03

## 2023-03-02 RX ORDER — HYDROMORPHONE HYDROCHLORIDE 1 MG/ML
0.5 INJECTION, SOLUTION INTRAMUSCULAR; INTRAVENOUS; SUBCUTANEOUS EVERY 5 MIN PRN
Status: DISCONTINUED | OUTPATIENT
Start: 2023-03-02 | End: 2023-03-02 | Stop reason: HOSPADM

## 2023-03-02 RX ORDER — FENTANYL CITRATE 50 UG/ML
INJECTION, SOLUTION INTRAMUSCULAR; INTRAVENOUS PRN
Status: DISCONTINUED | OUTPATIENT
Start: 2023-03-02 | End: 2023-03-02 | Stop reason: SDUPTHER

## 2023-03-02 RX ORDER — ALBUTEROL SULFATE 90 UG/1
AEROSOL, METERED RESPIRATORY (INHALATION) PRN
Status: DISCONTINUED | OUTPATIENT
Start: 2023-03-02 | End: 2023-03-02 | Stop reason: SDUPTHER

## 2023-03-02 RX ORDER — PROCHLORPERAZINE EDISYLATE 5 MG/ML
5 INJECTION INTRAMUSCULAR; INTRAVENOUS
Status: DISCONTINUED | OUTPATIENT
Start: 2023-03-02 | End: 2023-03-02 | Stop reason: HOSPADM

## 2023-03-02 RX ORDER — CEFAZOLIN 2 G/1
INJECTION, POWDER, FOR SOLUTION INTRAMUSCULAR; INTRAVENOUS
Status: COMPLETED
Start: 2023-03-02 | End: 2023-03-02

## 2023-03-02 RX ORDER — ASPIRIN 81 MG/1
81 TABLET ORAL 2 TIMES DAILY
Qty: 30 TABLET | Refills: 0 | Status: SHIPPED | OUTPATIENT
Start: 2023-03-02

## 2023-03-02 RX ORDER — ONDANSETRON 2 MG/ML
INJECTION INTRAMUSCULAR; INTRAVENOUS PRN
Status: DISCONTINUED | OUTPATIENT
Start: 2023-03-02 | End: 2023-03-02 | Stop reason: SDUPTHER

## 2023-03-02 RX ORDER — MORPHINE SULFATE 2 MG/ML
2 INJECTION, SOLUTION INTRAMUSCULAR; INTRAVENOUS EVERY 5 MIN PRN
Status: DISCONTINUED | OUTPATIENT
Start: 2023-03-02 | End: 2023-03-02 | Stop reason: HOSPADM

## 2023-03-02 RX ORDER — LABETALOL HYDROCHLORIDE 5 MG/ML
10 INJECTION, SOLUTION INTRAVENOUS
Status: DISCONTINUED | OUTPATIENT
Start: 2023-03-02 | End: 2023-03-02 | Stop reason: HOSPADM

## 2023-03-02 RX ORDER — METHYLPREDNISOLONE SODIUM SUCCINATE 40 MG/ML
40 INJECTION, POWDER, LYOPHILIZED, FOR SOLUTION INTRAMUSCULAR; INTRAVENOUS ONCE
Status: COMPLETED | OUTPATIENT
Start: 2023-03-02 | End: 2023-03-02

## 2023-03-02 RX ORDER — HYDROCODONE BITARTRATE AND ACETAMINOPHEN 5; 325 MG/1; MG/1
1 TABLET ORAL EVERY 6 HOURS PRN
Qty: 12 TABLET | Refills: 0 | Status: SHIPPED | OUTPATIENT
Start: 2023-03-02 | End: 2023-03-06 | Stop reason: HOSPADM

## 2023-03-02 RX ORDER — KETOROLAC TROMETHAMINE 15 MG/ML
INJECTION, SOLUTION INTRAMUSCULAR; INTRAVENOUS
Status: COMPLETED
Start: 2023-03-02 | End: 2023-03-02

## 2023-03-02 RX ORDER — SODIUM CHLORIDE 9 MG/ML
INJECTION, SOLUTION INTRAVENOUS CONTINUOUS PRN
Status: DISCONTINUED | OUTPATIENT
Start: 2023-03-02 | End: 2023-03-02 | Stop reason: SDUPTHER

## 2023-03-02 RX ORDER — ONDANSETRON 2 MG/ML
4 INJECTION INTRAMUSCULAR; INTRAVENOUS
Status: DISCONTINUED | OUTPATIENT
Start: 2023-03-02 | End: 2023-03-02 | Stop reason: HOSPADM

## 2023-03-02 RX ORDER — DIPHENHYDRAMINE HYDROCHLORIDE 50 MG/ML
12.5 INJECTION INTRAMUSCULAR; INTRAVENOUS
Status: DISCONTINUED | OUTPATIENT
Start: 2023-03-02 | End: 2023-03-02 | Stop reason: HOSPADM

## 2023-03-02 RX ADMIN — TRIAMCINOLONE ACETONIDE: 1 CREAM TOPICAL at 08:11

## 2023-03-02 RX ADMIN — ALBUTEROL SULFATE 2.5 MG: 2.5 SOLUTION RESPIRATORY (INHALATION) at 18:38

## 2023-03-02 RX ADMIN — IPRATROPIUM BROMIDE 0.5 MG: 0.5 SOLUTION RESPIRATORY (INHALATION) at 06:47

## 2023-03-02 RX ADMIN — FENTANYL CITRATE 25 MCG: 50 INJECTION, SOLUTION INTRAMUSCULAR; INTRAVENOUS at 22:38

## 2023-03-02 RX ADMIN — KETOROLAC TROMETHAMINE 15 MG: 15 INJECTION, SOLUTION INTRAMUSCULAR; INTRAVENOUS at 23:27

## 2023-03-02 RX ADMIN — ALBUTEROL SULFATE 2.5 MG: 2.5 SOLUTION RESPIRATORY (INHALATION) at 06:47

## 2023-03-02 RX ADMIN — AZITHROMYCIN MONOHYDRATE 500 MG: 250 TABLET ORAL at 08:01

## 2023-03-02 RX ADMIN — CEFAZOLIN 2 G: 1 INJECTION, POWDER, FOR SOLUTION INTRAMUSCULAR; INTRAVENOUS at 20:46

## 2023-03-02 RX ADMIN — ARFORMOTEROL TARTRATE 15 MCG: 15 SOLUTION RESPIRATORY (INHALATION) at 18:38

## 2023-03-02 RX ADMIN — Medication 10 ML: at 08:03

## 2023-03-02 RX ADMIN — ALBUTEROL SULFATE 2.5 MG: 2.5 SOLUTION RESPIRATORY (INHALATION) at 15:09

## 2023-03-02 RX ADMIN — IPRATROPIUM BROMIDE 0.5 MG: 0.5 SOLUTION RESPIRATORY (INHALATION) at 15:09

## 2023-03-02 RX ADMIN — METHYLPREDNISOLONE SODIUM SUCCINATE 40 MG: 40 INJECTION, POWDER, FOR SOLUTION INTRAMUSCULAR; INTRAVENOUS at 16:19

## 2023-03-02 RX ADMIN — WATER 1000 MG: 1 INJECTION INTRAMUSCULAR; INTRAVENOUS; SUBCUTANEOUS at 08:01

## 2023-03-02 RX ADMIN — HEPARIN 300 UNITS: 100 SYRINGE at 08:02

## 2023-03-02 RX ADMIN — ALBUTEROL SULFATE 2 PUFF: 90 AEROSOL, METERED RESPIRATORY (INHALATION) at 22:17

## 2023-03-02 RX ADMIN — SODIUM CHLORIDE: 900 INJECTION, SOLUTION INTRAVENOUS at 20:40

## 2023-03-02 RX ADMIN — ROCURONIUM BROMIDE 40 MG: 10 SOLUTION INTRAVENOUS at 20:56

## 2023-03-02 RX ADMIN — ALBUTEROL SULFATE 2.5 MG: 2.5 SOLUTION RESPIRATORY (INHALATION) at 10:06

## 2023-03-02 RX ADMIN — IPRATROPIUM BROMIDE 0.5 MG: 0.5 SOLUTION RESPIRATORY (INHALATION) at 10:06

## 2023-03-02 RX ADMIN — FENTANYL CITRATE 100 MCG: 50 INJECTION, SOLUTION INTRAMUSCULAR; INTRAVENOUS at 20:56

## 2023-03-02 RX ADMIN — SUGAMMADEX 494 MG: 100 INJECTION, SOLUTION INTRAVENOUS at 22:17

## 2023-03-02 RX ADMIN — PROPOFOL 50 MG: 10 INJECTION, EMULSION INTRAVENOUS at 20:56

## 2023-03-02 RX ADMIN — ARFORMOTEROL TARTRATE 15 MCG: 15 SOLUTION RESPIRATORY (INHALATION) at 06:47

## 2023-03-02 RX ADMIN — ONDANSETRON 4 MG: 2 INJECTION INTRAMUSCULAR; INTRAVENOUS at 22:13

## 2023-03-02 RX ADMIN — HYDROMORPHONE HYDROCHLORIDE 0.5 MG: 1 INJECTION, SOLUTION INTRAMUSCULAR; INTRAVENOUS; SUBCUTANEOUS at 23:08

## 2023-03-02 RX ADMIN — ANTI-FUNGAL POWDER MICONAZOLE NITRATE TALC FREE: 1.42 POWDER TOPICAL at 08:03

## 2023-03-02 RX ADMIN — HYDROCODONE BITARTRATE AND ACETAMINOPHEN 1 TABLET: 5; 325 TABLET ORAL at 14:06

## 2023-03-02 RX ADMIN — HYDROCODONE BITARTRATE AND ACETAMINOPHEN 1 TABLET: 5; 325 TABLET ORAL at 08:00

## 2023-03-02 RX ADMIN — IPRATROPIUM BROMIDE 0.5 MG: 0.5 SOLUTION RESPIRATORY (INHALATION) at 18:38

## 2023-03-02 RX ADMIN — LIDOCAINE HYDROCHLORIDE 50 MG: 20 INJECTION, SOLUTION INTRAVENOUS at 20:56

## 2023-03-02 ASSESSMENT — PAIN SCALES - GENERAL
PAINLEVEL_OUTOF10: 5
PAINLEVEL_OUTOF10: 10
PAINLEVEL_OUTOF10: 7
PAINLEVEL_OUTOF10: 0
PAINLEVEL_OUTOF10: 8
PAINLEVEL_OUTOF10: 8
PAINLEVEL_OUTOF10: 0

## 2023-03-02 ASSESSMENT — PAIN DESCRIPTION - PAIN TYPE
TYPE: SURGICAL PAIN

## 2023-03-02 ASSESSMENT — PAIN DESCRIPTION - ORIENTATION
ORIENTATION: RIGHT
ORIENTATION: RIGHT;LEFT
ORIENTATION: RIGHT

## 2023-03-02 ASSESSMENT — PAIN DESCRIPTION - DESCRIPTORS
DESCRIPTORS: ACHING;DISCOMFORT
DESCRIPTORS: ACHING;DISCOMFORT
DESCRIPTORS: ACHING
DESCRIPTORS: ACHING
DESCRIPTORS: ACHING;DISCOMFORT

## 2023-03-02 ASSESSMENT — PAIN DESCRIPTION - LOCATION
LOCATION: ANKLE
LOCATION: BACK;FOOT
LOCATION: ANKLE
LOCATION: BACK;LEG
LOCATION: ANKLE

## 2023-03-02 ASSESSMENT — PAIN - FUNCTIONAL ASSESSMENT
PAIN_FUNCTIONAL_ASSESSMENT: PREVENTS OR INTERFERES WITH MANY ACTIVE NOT PASSIVE ACTIVITIES
PAIN_FUNCTIONAL_ASSESSMENT: PREVENTS OR INTERFERES WITH ALL ACTIVE AND SOME PASSIVE ACTIVITIES

## 2023-03-02 ASSESSMENT — COPD QUESTIONNAIRES: CAT_SEVERITY: SEVERE

## 2023-03-02 ASSESSMENT — PAIN SCALES - WONG BAKER
WONGBAKER_NUMERICALRESPONSE: 0
WONGBAKER_NUMERICALRESPONSE: 0

## 2023-03-02 NOTE — ANESTHESIA PRE PROCEDURE
Department of Anesthesiology  Preprocedure Note       Name:  Hansa Lyn   Age:  79 y.o.  :  1940                                          MRN:  [id number]         Date:  3/2/2023      Surgeon: Cynthia Salcedo):  Francoise Foreman DO    Procedure: Procedure(s):  RIGHT ANKLE OPEN REDUCTION INTERNAL FIXATION VS APPLICATION EXTERNAL FIXATION    Medications prior to admission:   Prior to Admission medications    Medication Sig Start Date End Date Taking? Authorizing Provider   PREDNISONE PO Take 10 mg by mouth daily Tapering dose   Yes Historical Provider, MD   furosemide (LASIX) 20 MG tablet Take 20 mg by mouth daily   Yes Historical Provider, MD   triamcinolone (KENALOG) 0.1 % cream Apply 1 each topically 2 times daily Apply topically 2 times daily.    Yes Historical Provider, MD   latanoprost (XALATAN) 0.005 % ophthalmic solution Place 1 drop into both eyes nightly   Yes Historical Provider, MD       Current medications:    Current Facility-Administered Medications   Medication Dose Route Frequency Provider Last Rate Last Admin    ceFAZolin (ANCEF) 2 g injection             sterile water injection             insulin lispro (HUMALOG) injection vial 0-8 Units  0-8 Units SubCUTAneous TID WC Elen Kohli, APRN - CNP   2 Units at 23 1305    insulin lispro (HUMALOG) injection vial 0-4 Units  0-4 Units SubCUTAneous Nightly Delpha Seun, APRN - CNP   4 Units at 23 2139    glucose chewable tablet 16 g  4 tablet Oral PRN Delpha Seun, APRN - CNP        dextrose bolus 10% 125 mL  125 mL IntraVENous PRN Delpha Seun, APRN - CNP        Or    dextrose bolus 10% 250 mL  250 mL IntraVENous PRN Delpha Seun, APRN - CNP        glucagon (rDNA) injection 1 mg  1 mg SubCUTAneous PRN Delpha Seun, APRN - CNP        dextrose 10 % infusion   IntraVENous Continuous PRN Delpha Seun, APRN - CNP        pantoprazole (PROTONIX) tablet 40 mg  40 mg Oral QATYLOR Foster DO   40 mg at 23 0790    azithromycin [facility]) tablet 500 mg  500 mg Oral Daily Cele Umana MD   500 mg at 03/02/23 0801    latanoprost (XALATAN) 0.005 % ophthalmic solution 1 drop  1 drop Both Eyes Nightly Porfirio Hammonds MD   1 drop at 03/01/23 2146    triamcinolone (KENALOG) 0.1 % cream   Topical BID Porfirio Hammonds MD   Given at 03/02/23 0811    HYDROcodone-acetaminophen (NORCO) 5-325 MG per tablet 1 tablet  1 tablet Oral Q6H PRN JR Bagley - CNP   1 tablet at 03/02/23 1406    sodium chloride flush 0.9 % injection 5-40 mL  5-40 mL IntraVENous 2 times per day Cele Umana MD   10 mL at 03/02/23 0803    sodium chloride flush 0.9 % injection 5-40 mL  5-40 mL IntraVENous PRN Cele Umana MD        0.9 % sodium chloride infusion   IntraVENous PRN Cele Umana MD        ondansetron (ZOFRAN-ODT) disintegrating tablet 4 mg  4 mg Oral Q8H PRN Cele Umana MD        Or    ondansetron [facility]) injection 4 mg  4 mg IntraVENous Q6H ADEN Umana MD   4 mg at 02/26/23 2150    polyethylene glycol (GLYCOLAX) packet 17 g  17 g Oral Daily PRN Cele Umana MD        enoxaparin Sodium (LOVENOX) injection 30 mg  30 mg SubCUTAneous BID Cele Umana MD   30 mg at 03/01/23 0931    acetaminophen (TYLENOL) tablet 650 mg  650 mg Oral Q6H ADEN Umana MD   650 mg at 02/25/23 1756    Or    acetaminophen (TYLENOL) suppository 650 mg  650 mg Rectal Q6H PRROMY Umana MD        arformoterol tartrate [facility]) nebulizer solution 15 mcg  15 mcg Nebulization BID Cele Umana MD   15 mcg at 03/02/23 1838    albuterol (PROVENTIL) nebulizer solution 2.5 mg  2.5 mg Nebulization Q4H WA Cele Umana MD   2.5 mg at 03/02/23 8219    And    ipratropium (ATROVENT) 0.02 % nebulizer solution 0.5 mg  0.5 mg Nebulization Q4H THERESA Umana MD   0.5 mg at 03/02/23 1838    atorvastatin (LIPITOR) tablet 10 mg  10 mg Oral Daily Kelsey Still DO   10 mg at 03/01/23 0931    miconazole (MICOTIN) 2 % powder   Topical BID Andrea Lerma MD   Given at 03/02/23 0803    sodium chloride flush 0.9 % injection 5-40 mL  5-40 mL IntraVENous 2 times per day Andrea Lerma MD   10 mL at 03/02/23 0803    sodium chloride flush 0.9 % injection 5-40 mL  5-40 mL IntraVENous PRN Andrea Lerma MD   10 mL at 02/26/23 2150    heparin flush 100 UNIT/ML injection 300 Units  3 mL IntraVENous 2 times per day Andrea Lerma MD   300 Units at 03/02/23 0802    heparin flush 100 UNIT/ML injection 300 Units  3 mL IntraCATHeter PRN Andrea Lerma MD   300 Units at 02/26/23 2046       Allergies:  No Known Allergies    Problem List:    Patient Active Problem List   Diagnosis Code    Acute on chronic respiratory failure with hypoxia and hypercapnia (HCC) J96.21, J96.22    Elevated troponin R77.8    Iron deficiency anemia D50.9    KIMBERLY (acute kidney injury) ([facility] [address] N17.9    Primary hypertension I10    Pemphigus foliaceous L10.2    Acute respiratory failure with hypoxia and hypercapnia (HCC) J96.01, J96.02    Closed fracture of right ankle S82.891A    Community acquired pneumonia of right upper lobe of lung J18.9    Type 2 diabetes mellitus with hyperglycemia, without long-term current use of insulin (HCC) E11.65       Past Medical History:        Diagnosis Date    Cancer [facility])     uterus  has hysterectomy    Fracture of right ankle     Hyperlipidemia     Hypertension     Obese        Past Surgical History:        Procedure Laterality Date    APPENDECTOMY      CARPAL TUNNEL RELEASE Right 12/05/2016    right trigger finger middle and ring    CHOLECYSTECTOMY      HYSTERECTOMY (CERVIX STATUS UNKNOWN)         Social History:    Social History     Tobacco Use    Smoking status: Never    Smokeless tobacco: Not on file   Substance Use Topics    Alcohol use: Not on file                                Counseling given: Not Answered      Vital Signs (Current):   Vitals:    03/02/23 1007 03/02/23 1357 03/02/23 1406 03/02/23 1614   BP:  (!) 124/49  (!) 129/59   Pulse: 83 89  88   Resp: 18 20 20 19   Temp:  98.6 °F (37 °C)  98.7 °F (37.1 °C)   TempSrc:  Oral  Oral   SpO2: 95% 96%  95%   Weight:       Height:                                                  BP Readings from Last 3 Encounters:   03/02/23 (!) 129/59   12/05/16 130/65       NPO Status: Time of last liquid consumption: 2345                        Time of last solid consumption: 1800                        Date of last liquid consumption: 03/01/23                        Date of last solid food consumption: 03/01/23    BMI:   Wt Readings from Last 3 Encounters:   02/27/23 272 lb 2 oz (123.4 kg)   12/05/16 241 lb (109.3 kg)   11/29/16 240 lb (108.9 kg)     Body mass index is 43.92 kg/m². CBC:   Lab Results   Component Value Date/Time    WBC 10.2 03/02/2023 06:12 AM    RBC 4.07 03/02/2023 06:12 AM    HGB 8.7 03/02/2023 06:12 AM    HCT 32.8 03/02/2023 06:12 AM    MCV 80.6 03/02/2023 06:12 AM    RDW 19.4 03/02/2023 06:12 AM     03/02/2023 06:12 AM       CMP:   Lab Results   Component Value Date/Time     03/02/2023 08:00 AM    K 5.0 03/02/2023 08:00 AM    K 5.1 02/27/2023 04:42 AM    CL 95 03/02/2023 08:00 AM    CO2 39 03/02/2023 08:00 AM    BUN 21 03/02/2023 08:00 AM    CREATININE 0.4 03/02/2023 08:00 AM    GFRAA >60 04/22/2021 01:15 PM    LABGLOM >60 03/02/2023 08:00 AM    GLUCOSE 111 03/02/2023 08:00 AM    GLUCOSE 92 03/02/2012 12:31 PM    PROT 5.6 02/28/2023 05:30 AM    CALCIUM 8.7 03/02/2023 08:00 AM    BILITOT 0.4 02/28/2023 05:30 AM    ALKPHOS 62 02/28/2023 05:30 AM    AST 27 02/28/2023 05:30 AM    ALT 16 02/28/2023 05:30 AM       POC Tests: No results for input(s): POCGLU, POCNA, POCK, POCCL, POCBUN, POCHEMO, POCHCT in the last 72 hours.     Coags: No results found for: PROTIME, INR, APTT    HCG (If Applicable): No results found for: PREGTESTUR, PREGSERUM, HCG, HCGQUANT     ABGs: No results found for: PHART, PO2ART, XPF4HPP, DMM1LRC, BEART, Q2QQZIVJ     Type & Screen (If Applicable):  No results found for: LABABO, LABRH    Drug/Infectious Status (If Applicable):  No results found for: HIV, HEPCAB    COVID-19 Screening (If Applicable):   Lab Results   Component Value Date/Time    COVID19 Not Detected 02/24/2023 05:36 AM           Anesthesia Evaluation  Patient summary reviewed no history of anesthetic complications:   Airway: Mallampati: III  TM distance: >3 FB     Mouth opening: > = 3 FB   Dental:    (+) edentulous      Pulmonary:   (+) COPD: severe,  decreased breath sounds: bilateral                           ROS comment: Acute on chronic respiratory failure with hypoxia and hypercapnia    Cardiovascular:    (+) hypertension:, hyperlipidemia        Rhythm: regular  Rate: normal                    Neuro/Psych:   Negative Neuro/Psych ROS              GI/Hepatic/Renal:   (+) morbid obesity          Endo/Other:    (+) DiabetesType II DM, , malignancy/cancer (hx. uterine cancer s/p hysterectomy). ROS comment: Closed fracture of right ankle Abdominal:             Vascular: negative vascular ROS. Other Findings:           Anesthesia Plan      general     ASA 4       Induction: intravenous. MIPS: Postoperative opioids intended and Prophylactic antiemetics administered. Anesthetic plan and risks discussed with patient and child/children. Plan discussed with CRNA. DOS STAFF ADDENDUM:    Pt seen and examined, chart reviewed (including anesthesia, drug and allergy history). Anesthetic plan, risks, benefits, alternatives, and personnel involved discussed with patient. Patient verbalized an understanding and agrees to proceed. Plan discussed with care team members and agreed upon.     Severo Way MD  Staff Anesthesiologist  7:45 PM    Severo Way MD   3/2/2023       JR Chacko - CRNA

## 2023-03-02 NOTE — PROGRESS NOTES
[address] [place], [place] [address]   ICU Progress Note    Admitting Date and Time: 2/24/2023  4:38 AM  Admit Dx: Elevated troponin [R77.8]  Closed fracture of right ankle, initial encounter [S82.288U]  Acute respiratory failure with hypoxia and hypercapnia (HCC) [J96.01, J96.02]  Acute hypercapnic respiratory failure (HCC) [J96.02]  Anemia, unspecified type [D64.9]  Pain of right hip joint [M25.551]    Subjective/interval history:    2/25: Patient was able to avoid intubation with Bipap. She wore most of yesterday and overnight. She states she is weak. She remembers trying to take dog out to bathroom and got tripped up with door opening and fell. Per RN, Levophed weaned down to 4 mcg/min. Currently on 10 L HFNC. Seen by wound nurse yesterday. 2/26: Patient sitting up in bed. She is feeling better and asking    Per RN:  weaned off Levophed yesterday afternoon. Oxygen down to 6L today. Patient to be transferred to [facility].    2/27: Patient sitting up in bed. Her daughter is present at bedside. Has right ankle pain, but well controlled with as needed analgesics. Still on stress dose IV steroids. 2/28: Patient sitting up in bed, her daughter is at bedside. States she had a rough morning today. RN note reviewed and she had an episode of desaturation and was placed back on BiPAP. Feels better now, breathing comfortably. During encounter was on 5.5 L nasal cannula, I decrease this to 5 L as oxygen saturation in the high 90s. 3/1: Patient sitting up in bed, sleeping but awakens easily to voice. Pain well controlled. Tentatively scheduled for surgical repair of right ankle fracture tomorrow. 3/2: Patient sitting up in bed, sleeping but awakens easily to voice. Pain well controlled. Denies any complaints. Scheduled for surgical repair of ankle fracture today.      insulin lispro  0-8 Units SubCUTAneous TID WC    insulin lispro  0-4 Units SubCUTAneous Nightly    pantoprazole  40 mg Oral QAM AC azithromycin  500 mg Oral Daily    latanoprost  1 drop Both Eyes Nightly    triamcinolone   Topical BID    sodium chloride flush  5-40 mL IntraVENous 2 times per day    enoxaparin  30 mg SubCUTAneous BID    arformoterol tartrate  15 mcg Nebulization BID    albuterol  2.5 mg Nebulization Q4H WA    And    ipratropium  0.5 mg Nebulization Q4H WA    atorvastatin  10 mg Oral Daily    miconazole   Topical BID    sodium chloride flush  5-40 mL IntraVENous 2 times per day    heparin flush  3 mL IntraVENous 2 times per day     glucose, 4 tablet, PRN  dextrose bolus, 125 mL, PRN   Or  dextrose bolus, 250 mL, PRN  glucagon (rDNA), 1 mg, PRN  dextrose, , Continuous PRN  HYDROcodone 5 mg - acetaminophen, 1 tablet, Q6H PRN  sodium chloride flush, 5-40 mL, PRN  sodium chloride, , PRN  ondansetron, 4 mg, Q8H PRN   Or  ondansetron, 4 mg, Q6H PRN  polyethylene glycol, 17 g, Daily PRN  acetaminophen, 650 mg, Q6H PRN   Or  acetaminophen, 650 mg, Q6H PRN  sodium chloride flush, 5-40 mL, PRN  heparin flush, 3 mL, PRN       Objective:    BP (!) 131/57   Pulse 88   Temp 97.9 °F (36.6 °C) (Oral)   Resp 20   Ht 5' 6\" (1.676 m)   Wt 272 lb 2 oz (123.4 kg)   SpO2 95%   BMI 43.92 kg/m²   General Appearance: alert and oriented to person, place and time and in no acute distress. Skin: multiple scabs off arms and legs as well as bod  Head: normocephalic and atraumatic  Eyes: pupils equal, round, and reactive to light, extraocular eye movements intact, conjunctivae normal  Neck: neck supple and non tender without mass   Pulmonary/Chest: Nonlabored on 4 L nasal cannula. Diminished but otherwise clear to auscultation bilaterally without crackles, rhonchi, or wheezes  Cardiovascular: normal rate, normal S1 and S2 and no carotid bruits  Abdomen: Obese, soft, obese, non-tender, non-distended, normal bowel sounds, no masses or organomegaly  Extremities: Right lower extremity in soft splint. Mild left lower extremity edema.   No left calf tenderness. No cyanosis or clubbing. Brisk capillary refill to toes bilaterally  Neurologic: no cranial nerve deficit and speech normal      Recent Labs     03/01/23  0618 03/02/23  0612 03/02/23  0800    138 136   K 5.4* 6.8* 5.0   CL 95* 94* 95*   CO2 39* 41* 39*   BUN 31* 22 21   CREATININE 0.5 0.4* 0.4*   GLUCOSE 146* 106* 111*   CALCIUM 9.0 7.3* 8.7         Recent Labs     02/28/23  0530   ALKPHOS 62   PROT 5.6*   LABALBU 2.9*   BILITOT 0.4   AST 27   ALT 16         Recent Labs     02/28/23  0530 03/02/23  0612   WBC 11.8* 10.2   RBC 4.07 4.07   HGB 8.6* 8.7*   HCT 32.8* 32.8*   MCV 80.6 80.6   MCH 21.1* 21.4*   MCHC 26.2* 26.5*   RDW 18.9* 19.4*    353   MPV 11.1 11.2        Latest Reference Range & Units 2/24/23 05:36 2/24/23 08:02   Pro-BNP 0 - 450 pg/mL 18,200 (H)    Troponin, High Sensitivity 0 - 9 ng/L 66 (H) 51 (H)   (H): Data is abnormally high     Latest Reference Range & Units 2/24/23 05:22 2/24/23 08:18 2/24/23 09:49 2/24/23 12:13 2/24/23 12:46   Source:   Blood Arterial Blood Arterial Blood Arterial Blood Arterial   pH, Blood Gas 7.350 - 7.450   7.168 (LL) 7.206 (LL) 7.193 (LL) 7. 456 (H)   PH (TEMPERATURE CORRECTED) 7.350 - 7.450  7. 182 (LL)       PCO2 35.0 - 45.0 mmHg  117.6 (HH) 98.4 (HH) 105.4 (HH) 52.2 (H)   PCO2 (TEMP CORRECTED) 35.0 - 45.0 mmHg 90.9 (HH)       pO2 75.0 - 100.0 mmHg  76.0 138.1 (H) 71.1 (L) 115.5 (H)   PO2 (TEMP CORRECTED) 60.0 - 80.0 mmHg 104.3 (H)       HCO3 22.0 - 26.0 mmol/L 34.1 (H) 41.7 (H) 38.1 (H) 39.6 (H) 36.0 (H)   Base Excess -3.0 - 3.0 mmol/L 2.4 9.2 (H) 7.0 (H) 8.3 (H) 10.5 (H)   O2 Sat 92.0 - 98.5 % 95.7 89.1 (L) 97.9 87.9 (L) 98.1   THB,THB 11.5 - 16.5 g/dL  12.2 12.1 11.4 (L) 11.3 (L)   O2Hb 94.0 - 97.0 %  88.0 (L) 96.6 87.0 (L) 97.3 (H)   Carboxy-Hgb 0.0 - 1.5 %  0.7 0.7 0.5 0.3   METHB,METHB 0.0 - 1.5 %  0.5 0.6 0.5 0.5   HHb 0.0 - 5.0 %  10.8 (H) 2.1 12.0 (H) 1.9   O2 Content mL/dL  15.2 16.7 14.0 15.6   AaDO2 mmHg  315.1 451. 5 368.6 380.1 RI(T)   4.15 3.27 5.18 3.29   FIO2 % 100.0 75.0 100.0 80.0 80.0   PO2/FIO2 mmHg/%  1.01 1.38 0.89 1.44   (LL): Data is critically low  (HH): Data is critically high  (H): Data is abnormally high  (L): Data is abnormally low    Radiology:   CT ANKLE RIGHT WO CONTRAST   Final Result   Medial and posterior malleoli mildly displaced fractures with anterior   subluxation of the tibiotalar joint. XR HIP RIGHT (2-3 VIEWS)   Final Result   No acute displaced fractures         XR TIBIA FIBULA RIGHT (2 VIEWS)   Final Result   Right fibular neck fracture and questionable lateral tibial plateau fracture   for which noncontrast CT could be considered. Fracture dislocation at the   ankle with restoration of ankle and Lucien E post reduction. Fractures at the   posterior and medial malleoli. XR KNEE RIGHT (1-2 VIEWS)   Final Result   Right fibular neck fracture and questionable lateral tibial plateau fracture   for which noncontrast CT could be considered. Fracture dislocation at the   ankle with restoration of ankle and Lucien E post reduction. Fractures at the   posterior and medial malleoli. XR ANKLE RIGHT (MIN 3 VIEWS)   Final Result   Right fibular neck fracture and questionable lateral tibial plateau fracture   for which noncontrast CT could be considered. Fracture dislocation at the   ankle with restoration of ankle and Lucien E post reduction. Fractures at the   posterior and medial malleoli. XR ANKLE RIGHT (MIN 3 VIEWS)   Final Result   Medial malleolar fracture with subluxation at the ankle manifest as anterior   widening of the ankle joint. Questionable fracture at the posterior   malleolus. See recommendations above. Heel spurs. XR CHEST 1 VIEW   Final Result   No active process allowing for rightward rotation. Fluoro For Surgical Procedures    (Results Pending)      TTE procedure:Echo Complete W/Doppler & Color Flow.      Procedure Date  Date: 02/24/2023 Start: 10:24 AM     Study Location: Portable  Technical Quality: Poor visualization due to body habitus. Indications:Abnormal cardiac enzymes. Patient Status: Routine     Contrast Medium: Definity. Height: 66 inches Weight: 241 pounds BSA: 2.17 m^2 BMI: 38.9 kg/m^2     BP: 106/54 mmHg      Findings      Left Ventricle   Ejection fraction is visually estimated at 60%. No regional wall motion   abnormalities seen. Normal left ventricular wall thickness. Left ventricle   size is normal. Indeterminate diastolic function. Assessment and Plan:  Principal Problem:    Acute on chronic respiratory failure with hypoxia and hypercapnia (HCC)  Active Problems:    Elevated troponin    Iron deficiency anemia    KIMBERLY (acute kidney injury) ([facility] [address]    Primary hypertension    Pemphigus foliaceous    Acute respiratory failure with hypoxia and hypercapnia (HCC)    Closed fracture of right ankle    Community acquired pneumonia of right upper lobe of lung    Type 2 diabetes mellitus with hyperglycemia, without long-term current use of insulin (HCC)  Resolved Problems:    * No resolved hospital problems. *          Acute on chronic hypercapnic and hypoxic respiratory failure   -Pulmonary highly suspects patient has baseline obesity hypoventilation syndrome.  -Intubation was able to be avoided with the use of BiPAP. Continued use of BiPAP at bedtime and as needed during daytime nap  -Now down to 4 L nasal cannula  -Case discussed with pulmonary Dr. Gisselle Castle 3/1. Patient will need to be on continuous BiPAP for least a few hours postoperatively. I called PACU to notify Maddy Glynn is aware. 2. Acute hypotension  -septic shock versus adrenal insufficiency as on prolong outpatient steroid therapy. Hydrocortisone was weaned and then discontinued on 3/1.   Blood pressure stable.  -Completed 7 days of IV ceftriaxone, will complete 7 days of oral azithromycin on 3/4  -PICC line placed R basilic vein 2/20 as patient had poor access and needs pressors. -weaned off Levophed and now off of IV fluids. Blood pressure has been stable over the last several days    3. KIMBERLY (resolved)  -creatinine trend 1.2-->1.0-->0.9-->0.6-->0.6-->0.5-->0.4    4. Elevated troponin/elevate BNP  -Echo done 2/24 shows EF 60% with indeterminate diastolic function  -elevated troponin likely demand ischemia from acute respiratory distress on admission. 5. Pemiphigus foliaceous/severe candidal skin disease  -topical  triamcinolone 0.1% cream to rash ( but not skin folds)  -miconazole 2% nitrate powder bid. 6. Primary hypertension  -home meds on hold     7. Right ankle fracture s/p fall  -Tentatively scheduled for surgical repair today 3/2     8. Type II diabetes mellitus  -Hemoglobin A1c 6.7, random blood glucose as high as 353. Consistent with new diagnosis of type 2 diabetes mellitus. Blood glucose elevation in this acute setting is higher than would be expected given her A1c, likely reflection of steroid-induced hyperglycemia. We will use corrective scale insulin for now while inpatient, recommended diet and lifestyle modifications on discharge    9. Disposition  -Plan for subacute rehab after surgical fixation    NOTE: This report was transcribed using voice recognition software. Every effort was made to ensure accuracy; however, inadvertent computerized transcription errors may be present.      Electronically signed by Al Lee DO on 3/2/2023 at 9:11 AM

## 2023-03-02 NOTE — OR NURSING
Dr. Radha Santana called, notified nursing that the patient will need BIPAP after surgery. Anesthesia notified.

## 2023-03-02 NOTE — H&P
Department of Orthopedic Surgery  H&P    Plan for open reduction internal fixation versus external fixation to the right ankle on 3-2-2023    HISTORY OF PRESENT ILLNESS:       Patient is a 79 y.o. female who presents with right ankle pain after mechanical fall on 2-. Patient states she tripped while walking her cat this morning colder family then called EMS. She was unable to ambulate following the injury she had right ankle pain. Ambulates with a walker at baseline. Denies numbness/tingling/paresthesias. Denies any other orthopedic complaints at this time.    Patient has having difficulty compensating due to a dry mouth    Past Medical History:        Diagnosis Date    Cancer [facility])     uterus  has hysterectomy    Fracture of right ankle     Hyperlipidemia     Hypertension     Obese      Past Surgical History:        Procedure Laterality Date    APPENDECTOMY      CARPAL TUNNEL RELEASE Right 12/05/2016    right trigger finger middle and ring    CHOLECYSTECTOMY      HYSTERECTOMY (CERVIX STATUS UNKNOWN)       Current Medications:   Current Facility-Administered Medications: insulin lispro (HUMALOG) injection vial 0-8 Units, 0-8 Units, SubCUTAneous, TID WC  insulin lispro (HUMALOG) injection vial 0-4 Units, 0-4 Units, SubCUTAneous, Nightly  glucose chewable tablet 16 g, 4 tablet, Oral, PRN  dextrose bolus 10% 125 mL, 125 mL, IntraVENous, PRN **OR** dextrose bolus 10% 250 mL, 250 mL, IntraVENous, PRN  glucagon (rDNA) injection 1 mg, 1 mg, SubCUTAneous, PRN  dextrose 10 % infusion, , IntraVENous, Continuous PRN  pantoprazole (PROTONIX) tablet 40 mg, 40 mg, Oral, QAM AC  azithromycin (ZITHROMAX) tablet 500 mg, 500 mg, Oral, Daily  latanoprost (XALATAN) 0.005 % ophthalmic solution 1 drop, 1 drop, Both Eyes, Nightly  triamcinolone (KENALOG) 0.1 % cream, , Topical, BID  HYDROcodone-acetaminophen (NORCO) 5-325 MG per tablet 1 tablet, 1 tablet, Oral, Q6H PRN  sodium chloride flush 0.9 % injection 5-40 mL, 5-40 mL, IntraVENous, 2 times per day  sodium chloride flush 0.9 % injection 5-40 mL, 5-40 mL, IntraVENous, PRN  0.9 % sodium chloride infusion, , IntraVENous, PRN  ondansetron (ZOFRAN-ODT) disintegrating tablet 4 mg, 4 mg, Oral, Q8H PRN **OR** ondansetron (ZOFRAN) injection 4 mg, 4 mg, IntraVENous, Q6H PRN  polyethylene glycol (GLYCOLAX) packet 17 g, 17 g, Oral, Daily PRN  enoxaparin Sodium (LOVENOX) injection 30 mg, 30 mg, SubCUTAneous, BID  acetaminophen (TYLENOL) tablet 650 mg, 650 mg, Oral, Q6H PRN **OR** acetaminophen (TYLENOL) suppository 650 mg, 650 mg, Rectal, Q6H PRN  arformoterol tartrate (BROVANA) nebulizer solution 15 mcg, 15 mcg, Nebulization, BID  albuterol (PROVENTIL) nebulizer solution 2.5 mg, 2.5 mg, Nebulization, Q4H WA **AND** ipratropium (ATROVENT) 0.02 % nebulizer solution 0.5 mg, 0.5 mg, Nebulization, Q4H WA  cefTRIAXone (ROCEPHIN) 1,000 mg in sterile water 10 mL IV syringe, 1,000 mg, IntraVENous, Q24H  atorvastatin (LIPITOR) tablet 10 mg, 10 mg, Oral, Daily  miconazole (MICOTIN) 2 % powder, , Topical, BID  sodium chloride flush 0.9 % injection 5-40 mL, 5-40 mL, IntraVENous, 2 times per day  sodium chloride flush 0.9 % injection 5-40 mL, 5-40 mL, IntraVENous, PRN  heparin flush 100 UNIT/ML injection 300 Units, 3 mL, IntraVENous, 2 times per day  heparin flush 100 UNIT/ML injection 300 Units, 3 mL, IntraCATHeter, PRN  Allergies:  Patient has no known allergies. Social History:   TOBACCO:   reports that she has never smoked. She does not have any smokeless tobacco history on file. ETOH:   has no history on file for alcohol use. DRUGS:   has no history on file for drug use. ACTIVITIES OF DAILY LIVING:    OCCUPATION:    Family History:   History reviewed. No pertinent family history.     REVIEW OF SYSTEMS:  CONSTITUTIONAL:  negative for  fevers, chills  EYES:  negative for blurred vision, visual disturbance  HEENT:  negative for  hearing loss, voice change  RESPIRATORY:  negative for  dyspnea, wheezing  CARDIOVASCULAR:  negative for  chest pain, palpitations  GASTROINTESTINAL:  negative for nausea, vomiting  GENITOURINARY:  negative for frequency, urinary incontinence  HEMATOLOGIC/LYMPHATIC:  negative for bleeding and petechiae  MUSCULOSKELETAL:  positive for  pain right ankle  NEUROLOGICAL:  negative for headaches, dizziness  BEHAVIOR/PSYCH:  negative for increased agitation and anxiety    PHYSICAL EXAM:    VITALS:  BP (!) 119/55   Pulse 78   Temp 97.5 °F (36.4 °C) (Axillary)   Resp 18   Ht 5' 6\" (1.676 m)   Wt 272 lb 2 oz (123.4 kg)   SpO2 100%   BMI 43.92 kg/m²   CONSTITUTIONAL:  awake, alert, cooperative, no apparent distress, and appears stated age  General appearance:  No apparent distress, appears stated age. HEENT:  Normal cephalic, atraumatic without obvious deformity. Pupils equal, round, and reactive to light. Extra ocular muscles intact. Conjunctivae/corneas clear. Neck: Supple, with full range of motion. No jugular venous distention. Trachea midline. Respiratory:  Normal respiratory effort. Clear to auscultation,   Cardiovascular:  RRR  Abdomen: Soft, non-tender, non-distended with normal bowel sounds. Skin: smooth and dry   Neurologic:   Cranial nerves: II-XII intact,   Psychiatric:  Alert and oriented x 3  MUSCULOSKELETAL:  Right lower Extremity:  Dressing C/D/I, splint intact in place  Palpable compartments soft and compressible  Unable to demonstrate plantarflexion dorsiflexion secondary to splint patient does have great toe extension, this is weak when compared to contralateral side. +2/4 DP pulses, Brisk Cap refill, Toes warm and perfused  Distal sensation grossly intact to Peroneals, Sural, Saphenous, and tibial nrs  Patient does note have notable scabs about the right lower extremity the knee and thigh    Secondary Exam:   bilateralUE: No obvious signs of trauma. -TTP to fingers, hand, wrist, forearm, elbow, humerus, shoulder or clavicle. -- Patient able to flex/extend fingers, wrist, elbow and shoulder with active and passive ROM without pain, +2/4 Radial pulse, cap refill <3sec, +AIN/PIN/Radial/Ulnar/Median N, distal sensation grossly intact to C4-T1 dermatomes, compartments soft and compressible. leftLE: No obvious signs of trauma. -TTP to foot, ankle, leg, knee, thigh, hip.-- Patient able to flex/extend toes, ankle, knee and hip with active and passive ROM without pain,+2/4 DP & PT pulses, cap refill <3sec, +5/5 PF/DF/EHL, distal sensation grossly intact to L4-S1 dermatomes, compartments soft and compressible. DATA:    CBC:   Lab Results   Component Value Date/Time    WBC 11.8 02/28/2023 05:30 AM    RBC 4.07 02/28/2023 05:30 AM    HGB 8.6 02/28/2023 05:30 AM    HCT 32.8 02/28/2023 05:30 AM    MCV 80.6 02/28/2023 05:30 AM    MCH 21.1 02/28/2023 05:30 AM    MCHC 26.2 02/28/2023 05:30 AM    RDW 18.9 02/28/2023 05:30 AM     02/28/2023 05:30 AM    MPV 11.1 02/28/2023 05:30 AM     PT/INR:  No results found for: PROTIME, INR    Radiology Review:  X-ray right ankle and tib-fib demonstrate a bimalleolar fracture subluxation. There is a transverse medial malleolus fracture with associated 25% posterior malleolus fracture and posterior talus subluxation. Right tib-fib film demonstrates a fibular head fracture with displacement. No other fractures or dislocations noted. Poor bone mineral density. CT right ankle demonstrates findings found above on x-ray, there is a displaced medial malleolus fracture with a posterior malleolus fracture that appears to be less than 25% of articular surface. The talus is anteriorly subluxed on the tibia. Fracture line does not appear to extend up the tibial shaft.      IMPRESSION:  Closed, right bimalleolar fracture subluxation  Closed, fibular head fracture    PLAN:  Nonweightbearing right lower extremity  Patient in a well-padded 3 sided splint  Elevate ice lower extremity as needed  Pain control and DVT prophylaxis per primary team  Plan for operating room today with Dr. Chase Hardy for open reduction internal fixation versus external fixation of the right ankle  Risk, benefits and treatment options were discussed and has verbalized understanding of options. The possibility of complications were also discussed to include but not limited to nerve damage, infection, problems with wound healing, vascular injury, chronic pain, stiffness, dysfunction, nonhealing of the bone, symptomatic hardware and/or its failure, need for subsequent surgery, dislocation, and blood clots as well as medical related problems and other problems not specifically discussed. Risk of anesthesia also discussed to include death. After all questions and concerns were address, patient agreed to proceed with the procedure.           Will discuss with attending     Bill Hester, DO  PGY-1 Orthopedic Surgery

## 2023-03-02 NOTE — PROGRESS NOTES
Pulmonary/Critical Care Progress Note    We are following patient for morbid obesity, obstructive sleep apnea, acute superimposed on chronic hypercapnic respiratory failure (no history of cigarette smoking, fracture of right ankle    SUBJECTIVE:  The patient is doing fairly well. However, her obesity and mild wheezing suggest that she will require continued aerosolized bronchodilators and 1 dose of methylprednisone that will be given now. Her attending physician Dr. Keyona Carrasquillo has correctly assessed her need for BiPAP in the postoperative period when she is extubated. Her daughter was in the room when I said that she requires a dose of preoperative steroids.     MEDICATIONS:   methylPREDNISolone  40 mg IntraVENous Once    insulin lispro  0-8 Units SubCUTAneous TID WC    insulin lispro  0-4 Units SubCUTAneous Nightly    pantoprazole  40 mg Oral QAM AC    azithromycin  500 mg Oral Daily    latanoprost  1 drop Both Eyes Nightly    triamcinolone   Topical BID    sodium chloride flush  5-40 mL IntraVENous 2 times per day    enoxaparin  30 mg SubCUTAneous BID    arformoterol tartrate  15 mcg Nebulization BID    albuterol  2.5 mg Nebulization Q4H WA    And    ipratropium  0.5 mg Nebulization Q4H WA    atorvastatin  10 mg Oral Daily    miconazole   Topical BID    sodium chloride flush  5-40 mL IntraVENous 2 times per day    heparin flush  3 mL IntraVENous 2 times per day      dextrose      sodium chloride       glucose, dextrose bolus **OR** dextrose bolus, glucagon (rDNA), dextrose, HYDROcodone 5 mg - acetaminophen, sodium chloride flush, sodium chloride, ondansetron **OR** ondansetron, polyethylene glycol, acetaminophen **OR** acetaminophen, sodium chloride flush, heparin flush      REVIEW OF SYSTEMS:  Constitutional: Denies fever, weight loss, night sweats, and fatigue  Skin: Denies pigmentation, dark lesions, and rashes   HEENT: Denies hearing loss, tinnitus, ear drainage, epistaxis, sore throat, and hoarseness. Cardiovascular: Denies palpitations, chest pain, and chest pressure. Respiratory: Denies cough, dyspnea at rest, hemoptysis, apnea, and choking. Gastrointestinal: Denies nausea, vomiting, poor appetite, diarrhea, heartburn or reflux  Genitourinary: Denies dysuria, frequency, urgency or hematuria  Musculoskeletal: Denies myalgias, muscle weakness, and bone pain  Neurological: Denies dizziness, vertigo, headache, and focal weakness  Psychological: Denies anxiety and depression  Endocrine: Denies heat intolerance and cold intolerance  Hematopoietic/Lymphatic: Denies bleeding problems and blood transfusions    OBJECTIVE:  Vitals:    03/02/23 1406   BP:    Pulse:    Resp: 20   Temp:    SpO2:      FiO2 : 55 %  O2 Flow Rate (L/min): 4 L/min  O2 Device: Nasal cannula    PHYSICAL EXAM:  Constitutional: No fever, chills, diaphoresis    Skin: Chronic venous stasis changes in ankles  HEENT: Mucous membranes moist  Neck: No JVD, lymphadenopathy, thyromegaly  Cardiovascular: S1, S2 regular. No S3 or rubs present  Respiratory: Few soft expiratory wheezes over both posterior lung fields  Gastrointestinal: Soft, markedly obese, nontender  Genitourinary: No CVA tenderness  Extremities: No clubbing, cyanosis, 1+ edema in ankles  Neurological: Awake, alert, oriented x3. No evidence of focal motor or sensory deficits  Psychological: In good spirits. Appropriate affect.     LABS:  WBC   Date Value Ref Range Status   03/02/2023 10.2 4.5 - 11.5 E9/L Final   02/28/2023 11.8 (H) 4.5 - 11.5 E9/L Final   02/27/2023 15.9 (H) 4.5 - 11.5 E9/L Final     Hemoglobin   Date Value Ref Range Status   03/02/2023 8.7 (L) 11.5 - 15.5 g/dL Final   02/28/2023 8.6 (L) 11.5 - 15.5 g/dL Final   02/27/2023 8.8 (L) 11.5 - 15.5 g/dL Final     Hematocrit   Date Value Ref Range Status   03/02/2023 32.8 (L) 34.0 - 48.0 % Final   02/28/2023 32.8 (L) 34.0 - 48.0 % Final   02/27/2023 33.5 (L) 34.0 - 48.0 % Final     MCV   Date Value Ref Range Status 03/02/2023 80.6 80.0 - 99.9 fL Final   02/28/2023 80.6 80.0 - 99.9 fL Final   02/27/2023 81.1 80.0 - 99.9 fL Final     Platelets   Date Value Ref Range Status   03/02/2023 353 130 - 450 E9/L Final   02/28/2023 314 130 - 450 E9/L Final   02/27/2023 331 130 - 450 E9/L Final     Sodium   Date Value Ref Range Status   03/02/2023 136 132 - 146 mmol/L Final   03/02/2023 138 132 - 146 mmol/L Final   03/01/2023 135 132 - 146 mmol/L Final     Potassium   Date Value Ref Range Status   03/02/2023 5.0 3.5 - 5.0 mmol/L Final   03/02/2023 6.8 (HH) 3.5 - 5.0 mmol/L Final   03/01/2023 5.4 (H) 3.5 - 5.0 mmol/L Final     Potassium reflex Magnesium   Date Value Ref Range Status   02/27/2023 5.1 (H) 3.5 - 5.0 mmol/L Final   02/26/2023 4.7 3.5 - 5.0 mmol/L Final   02/25/2023 3.6 3.5 - 5.0 mmol/L Final     Chloride   Date Value Ref Range Status   03/02/2023 95 (L) 98 - 107 mmol/L Final   03/02/2023 94 (L) 98 - 107 mmol/L Final   03/01/2023 95 (L) 98 - 107 mmol/L Final     CO2   Date Value Ref Range Status   03/02/2023 39 (H) 22 - 29 mmol/L Final   03/02/2023 41 (HH) 22 - 29 mmol/L Final   03/01/2023 39 (H) 22 - 29 mmol/L Final     BUN   Date Value Ref Range Status   03/02/2023 21 6 - 23 mg/dL Final   03/02/2023 22 6 - 23 mg/dL Final   03/01/2023 31 (H) 6 - 23 mg/dL Final     Creatinine   Date Value Ref Range Status   03/02/2023 0.4 (L) 0.5 - 1.0 mg/dL Final   03/02/2023 0.4 (L) 0.5 - 1.0 mg/dL Final   03/01/2023 0.5 0.5 - 1.0 mg/dL Final     Glucose   Date Value Ref Range Status   03/02/2023 111 (H) 74 - 99 mg/dL Final   03/02/2023 106 (H) 74 - 99 mg/dL Final   03/01/2023 146 (H) 74 - 99 mg/dL Final   03/02/2012 92 70 - 110 mg/dL Final     Calcium   Date Value Ref Range Status   03/02/2023 8.7 8.6 - 10.2 mg/dL Final   03/02/2023 7.3 (L) 8.6 - 10.2 mg/dL Final   03/01/2023 9.0 8.6 - 10.2 mg/dL Final     Total Protein   Date Value Ref Range Status   02/28/2023 5.6 (L) 6.4 - 8.3 g/dL Final   02/27/2023 5.8 (L) 6.4 - 8.3 g/dL Final 02/26/2023 5.8 (L) 6.4 - 8.3 g/dL Final     Albumin   Date Value Ref Range Status   02/28/2023 2.9 (L) 3.5 - 5.2 g/dL Final   02/27/2023 2.7 (L) 3.5 - 5.2 g/dL Final   02/26/2023 2.7 (L) 3.5 - 5.2 g/dL Final   03/02/2012 4.3 3.2 - 4.8 g/dL Final     Total Bilirubin   Date Value Ref Range Status   02/28/2023 0.4 0.0 - 1.2 mg/dL Final   02/27/2023 0.3 0.0 - 1.2 mg/dL Final   02/26/2023 0.4 0.0 - 1.2 mg/dL Final     Alkaline Phosphatase   Date Value Ref Range Status   02/28/2023 62 35 - 104 U/L Final   02/27/2023 87 35 - 104 U/L Final   02/26/2023 63 35 - 104 U/L Final     AST   Date Value Ref Range Status   02/28/2023 27 0 - 31 U/L Final   02/27/2023 30 0 - 31 U/L Final   02/26/2023 16 0 - 31 U/L Final     ALT   Date Value Ref Range Status   02/28/2023 16 0 - 32 U/L Final   02/27/2023 14 0 - 32 U/L Final   02/26/2023 8 0 - 32 U/L Final     Est, Glom Filt Rate   Date Value Ref Range Status   03/02/2023 >60 >=60 mL/min/1.73 Final     Comment:     Pediatric calculator link  [email or web address]  Effective Oct 3, 2022  These results are not intended for use in patients  <25years of age. eGFR results are calculated without  a race factor using the 2021 CKD-EPI equation. Careful  clinical correlation is recommended, particularly when  comparing to results calculated using previous equations. The CKD-EPI equation is less accurate in patients with  extremes of muscle mass, extra-renal metabolism of  creatinine, excessive creatinine ingestion, or following  therapy that affects renal tubular secretion. 03/02/2023 >60 >=60 mL/min/1.73 Final     Comment:     Pediatric calculator link  [email or web address]. [email or web address]_[email or web address]  Effective Oct 3, 2022  These results are not intended for use in patients  <25years of age. eGFR results are calculated without  a race factor using the 2021 CKD-EPI equation.   Careful  clinical correlation is recommended, particularly when  comparing to results calculated using previous equations. The CKD-EPI equation is less accurate in patients with  extremes of muscle mass, extra-renal metabolism of  creatinine, excessive creatinine ingestion, or following  therapy that affects renal tubular secretion. 03/01/2023 >60 >=60 mL/min/1.73 Final     Comment:     Pediatric calculator link  [email or web address]_[email or web address]  Effective Oct 3, 2022  These results are not intended for use in patients  <25years of age. eGFR results are calculated without  a race factor using the 2021 CKD-EPI equation. Careful  clinical correlation is recommended, particularly when  comparing to results calculated using previous equations. The CKD-EPI equation is less accurate in patients with  extremes of muscle mass, extra-renal metabolism of  creatinine, excessive creatinine ingestion, or following  therapy that affects renal tubular secretion. GFR    Date Value Ref Range Status   04/22/2021 >60  Final   01/14/2021 >60  Final   09/03/2020 >60  Final     Magnesium   Date Value Ref Range Status   03/02/2023 1.4 (L) 1.6 - 2.6 mg/dL Final     Phosphorus   Date Value Ref Range Status   02/27/2023 2.0 (L) 2.5 - 4.5 mg/dL Final   02/26/2023 3.3 2.5 - 4.5 mg/dL Final   02/25/2023 2.0 (L) 2.5 - 4.5 mg/dL Final     No results for input(s): PH, PO2, PCO2, HCO3, BE, O2SAT in the last 72 hours. RADIOLOGY:  CT ANKLE RIGHT WO CONTRAST   Final Result   Medial and posterior malleoli mildly displaced fractures with anterior   subluxation of the tibiotalar joint. XR HIP RIGHT (2-3 VIEWS)   Final Result   No acute displaced fractures         XR TIBIA FIBULA RIGHT (2 VIEWS)   Final Result   Right fibular neck fracture and questionable lateral tibial plateau fracture   for which noncontrast CT could be considered. Fracture dislocation at the   ankle with restoration of ankle and Lucien E post reduction.   Fractures at the posterior and medial malleoli. XR KNEE RIGHT (1-2 VIEWS)   Final Result   Right fibular neck fracture and questionable lateral tibial plateau fracture   for which noncontrast CT could be considered. Fracture dislocation at the   ankle with restoration of ankle and Lucien E post reduction. Fractures at the   posterior and medial malleoli. XR ANKLE RIGHT (MIN 3 VIEWS)   Final Result   Right fibular neck fracture and questionable lateral tibial plateau fracture   for which noncontrast CT could be considered. Fracture dislocation at the   ankle with restoration of ankle and Lucien E post reduction. Fractures at the   posterior and medial malleoli. XR ANKLE RIGHT (MIN 3 VIEWS)   Final Result   Medial malleolar fracture with subluxation at the ankle manifest as anterior   widening of the ankle joint. Questionable fracture at the posterior   malleolus. See recommendations above. Heel spurs. XR CHEST 1 VIEW   Final Result   No active process allowing for rightward rotation. Fluoro For Surgical Procedures    (Results Pending)           PROBLEM LIST:  Principal Problem:    Acute on chronic respiratory failure with hypoxia and hypercapnia (HCC)  Active Problems:    Elevated troponin    Iron deficiency anemia    KIMBERLY (acute kidney injury) ([facility] [address]    Primary hypertension    Pemphigus foliaceous    Acute respiratory failure with hypoxia and hypercapnia (HCC)    Closed fracture of right ankle    Community acquired pneumonia of right upper lobe of lung    Type 2 diabetes mellitus with hyperglycemia, without long-term current use of insulin (HCC)  Resolved Problems:    * No resolved hospital problems.  *      IMPRESSION:  Fracture right ankle  Morbid obesity  Obstructive sleep apnea  Asthma  History of hypertension    PLAN:  Aerosolized bronchodilators prior to surgery  IV steroids prior to surgery  Noninvasive ventilation after surgery  Monitor closely after surgery including blood gases      Electronically signed by Freda Huertas MD on 3/2/2023 at 3:48 PM

## 2023-03-02 NOTE — CARE COORDINATION
3/2/23 1737 CM note: COVID (-) 2/24/23. 4L nc, po zithromax. Soft cast to R ankle. Plan for R ankle ORIF vs external fixation with Dr Luis Padilla today. Per Dr Maxwell Lesches pt may be ready for dc tomorrow if she remains stable. Pts daughter Alida Olmos provided the following SNF choices: 1) [facility]-per Charity Jasmine they do not have any beds available at [facility] or [facility] but she will follow 2) [facility]- per Iluliss[facility] they have a female bed available and are following for updated therapy notes in am to see if they can accept. Sent message to both PT/OT requesting updated notes in am  3) [facility]- referral given to [facility] and she is reviewing. WILL NEED PRECERT FOR SNF. CM/SW will follow.  Electronically signed by Osvaldo Martino RN on 3/2/2023 at 5:50 PM

## 2023-03-03 LAB
ANION GAP SERPL CALCULATED.3IONS-SCNC: 1 MMOL/L (ref 7–16)
ANISOCYTOSIS: ABNORMAL
BASOPHILS ABSOLUTE: 0 E9/L (ref 0–0.2)
BASOPHILS RELATIVE PERCENT: 0 % (ref 0–2)
BUN BLDV-MCNC: 22 MG/DL (ref 6–23)
CALCIUM SERPL-MCNC: 7.7 MG/DL (ref 8.6–10.2)
CHLORIDE BLD-SCNC: 99 MMOL/L (ref 98–107)
CO2: 37 MMOL/L (ref 22–29)
CREAT SERPL-MCNC: 0.5 MG/DL (ref 0.5–1)
EOSINOPHILS ABSOLUTE: 0 E9/L (ref 0.05–0.5)
EOSINOPHILS RELATIVE PERCENT: 0 % (ref 0–6)
GFR SERPL CREATININE-BSD FRML MDRD: >60 ML/MIN/1.73
GLUCOSE BLD-MCNC: 122 MG/DL (ref 74–99)
HCT VFR BLD CALC: 29.5 % (ref 34–48)
HEMOGLOBIN: 8.1 G/DL (ref 11.5–15.5)
HYPOCHROMIA: ABNORMAL
LYMPHOCYTES ABSOLUTE: 0.2 E9/L (ref 1.5–4)
LYMPHOCYTES RELATIVE PERCENT: 1.8 % (ref 20–42)
MAGNESIUM: 1.9 MG/DL (ref 1.6–2.6)
MCH RBC QN AUTO: 21.8 PG (ref 26–35)
MCHC RBC AUTO-ENTMCNC: 27.5 % (ref 32–34.5)
MCV RBC AUTO: 79.5 FL (ref 80–99.9)
METAMYELOCYTES RELATIVE PERCENT: 0.9 % (ref 0–1)
METER GLUCOSE: 120 MG/DL (ref 74–99)
METER GLUCOSE: 178 MG/DL (ref 74–99)
METER GLUCOSE: 193 MG/DL (ref 74–99)
METER GLUCOSE: 222 MG/DL (ref 74–99)
METER GLUCOSE: 224 MG/DL (ref 74–99)
MONOCYTES ABSOLUTE: 0.2 E9/L (ref 0.1–0.95)
MONOCYTES RELATIVE PERCENT: 1.8 % (ref 2–12)
NEUTROPHILS ABSOLUTE: 9.8 E9/L (ref 1.8–7.3)
NEUTROPHILS RELATIVE PERCENT: 95.6 % (ref 43–80)
PDW BLD-RTO: 19.9 FL (ref 11.5–15)
PLATELET # BLD: 342 E9/L (ref 130–450)
PMV BLD AUTO: 10.5 FL (ref 7–12)
POIKILOCYTES: ABNORMAL
POLYCHROMASIA: ABNORMAL
POTASSIUM SERPL-SCNC: 5.2 MMOL/L (ref 3.5–5)
RBC # BLD: 3.71 E12/L (ref 3.5–5.5)
SODIUM BLD-SCNC: 137 MMOL/L (ref 132–146)
STOMATOCYTES: ABNORMAL
TARGET CELLS: ABNORMAL
WBC # BLD: 10.1 E9/L (ref 4.5–11.5)

## 2023-03-03 PROCEDURE — 97535 SELF CARE MNGMENT TRAINING: CPT

## 2023-03-03 PROCEDURE — 2580000003 HC RX 258

## 2023-03-03 PROCEDURE — 99232 SBSQ HOSP IP/OBS MODERATE 35: CPT | Performed by: INTERNAL MEDICINE

## 2023-03-03 PROCEDURE — 6370000000 HC RX 637 (ALT 250 FOR IP): Performed by: INTERNAL MEDICINE

## 2023-03-03 PROCEDURE — 82962 GLUCOSE BLOOD TEST: CPT

## 2023-03-03 PROCEDURE — 6370000000 HC RX 637 (ALT 250 FOR IP)

## 2023-03-03 PROCEDURE — 97110 THERAPEUTIC EXERCISES: CPT | Performed by: PHYSICAL THERAPIST

## 2023-03-03 PROCEDURE — 97530 THERAPEUTIC ACTIVITIES: CPT | Performed by: PHYSICAL THERAPIST

## 2023-03-03 PROCEDURE — 6360000002 HC RX W HCPCS

## 2023-03-03 PROCEDURE — 80048 BASIC METABOLIC PNL TOTAL CA: CPT

## 2023-03-03 PROCEDURE — 2060000000 HC ICU INTERMEDIATE R&B

## 2023-03-03 PROCEDURE — 36415 COLL VENOUS BLD VENIPUNCTURE: CPT

## 2023-03-03 PROCEDURE — 94640 AIRWAY INHALATION TREATMENT: CPT

## 2023-03-03 PROCEDURE — 94660 CPAP INITIATION&MGMT: CPT

## 2023-03-03 PROCEDURE — 83735 ASSAY OF MAGNESIUM: CPT

## 2023-03-03 PROCEDURE — 85025 COMPLETE CBC W/AUTO DIFF WBC: CPT

## 2023-03-03 PROCEDURE — 2500000003 HC RX 250 WO HCPCS

## 2023-03-03 RX ORDER — MECOBALAMIN 5000 MCG
5 TABLET,DISINTEGRATING ORAL NIGHTLY
Status: DISCONTINUED | OUTPATIENT
Start: 2023-03-03 | End: 2023-03-06 | Stop reason: HOSPADM

## 2023-03-03 RX ORDER — HYDROCODONE BITARTRATE AND ACETAMINOPHEN 5; 325 MG/1; MG/1
2 TABLET ORAL EVERY 6 HOURS PRN
Status: DISCONTINUED | OUTPATIENT
Start: 2023-03-03 | End: 2023-03-06 | Stop reason: HOSPADM

## 2023-03-03 RX ORDER — HYDROCODONE BITARTRATE AND ACETAMINOPHEN 5; 325 MG/1; MG/1
1 TABLET ORAL EVERY 6 HOURS PRN
Status: DISCONTINUED | OUTPATIENT
Start: 2023-03-03 | End: 2023-03-06 | Stop reason: HOSPADM

## 2023-03-03 RX ADMIN — PANTOPRAZOLE SODIUM 40 MG: 40 TABLET, DELAYED RELEASE ORAL at 05:57

## 2023-03-03 RX ADMIN — TRIAMCINOLONE ACETONIDE: 1 CREAM TOPICAL at 21:12

## 2023-03-03 RX ADMIN — HYDROCODONE BITARTRATE AND ACETAMINOPHEN 2 TABLET: 5; 325 TABLET ORAL at 14:30

## 2023-03-03 RX ADMIN — Medication 10 ML: at 21:14

## 2023-03-03 RX ADMIN — WATER 1000 MG: 1 INJECTION INTRAMUSCULAR; INTRAVENOUS; SUBCUTANEOUS at 14:31

## 2023-03-03 RX ADMIN — HEPARIN 300 UNITS: 100 SYRINGE at 01:22

## 2023-03-03 RX ADMIN — ENOXAPARIN SODIUM 30 MG: 100 INJECTION SUBCUTANEOUS at 21:15

## 2023-03-03 RX ADMIN — WATER 1000 MG: 1 INJECTION INTRAMUSCULAR; INTRAVENOUS; SUBCUTANEOUS at 04:30

## 2023-03-03 RX ADMIN — ANTI-FUNGAL POWDER MICONAZOLE NITRATE TALC FREE: 1.42 POWDER TOPICAL at 08:36

## 2023-03-03 RX ADMIN — ENOXAPARIN SODIUM 30 MG: 100 INJECTION SUBCUTANEOUS at 08:35

## 2023-03-03 RX ADMIN — AZITHROMYCIN MONOHYDRATE 500 MG: 250 TABLET ORAL at 08:35

## 2023-03-03 RX ADMIN — SODIUM ZIRCONIUM CYCLOSILICATE 5 G: 5 POWDER, FOR SUSPENSION ORAL at 11:57

## 2023-03-03 RX ADMIN — HYDROCODONE BITARTRATE AND ACETAMINOPHEN 2 TABLET: 5; 325 TABLET ORAL at 21:15

## 2023-03-03 RX ADMIN — IPRATROPIUM BROMIDE 0.5 MG: 0.5 SOLUTION RESPIRATORY (INHALATION) at 18:58

## 2023-03-03 RX ADMIN — ARFORMOTEROL TARTRATE 15 MCG: 15 SOLUTION RESPIRATORY (INHALATION) at 07:15

## 2023-03-03 RX ADMIN — ARFORMOTEROL TARTRATE 15 MCG: 15 SOLUTION RESPIRATORY (INHALATION) at 18:58

## 2023-03-03 RX ADMIN — HEPARIN 300 UNITS: 100 SYRINGE at 08:36

## 2023-03-03 RX ADMIN — LATANOPROST 1 DROP: 50 SOLUTION OPHTHALMIC at 21:12

## 2023-03-03 RX ADMIN — HYDROCODONE BITARTRATE AND ACETAMINOPHEN 1 TABLET: 5; 325 TABLET ORAL at 04:22

## 2023-03-03 RX ADMIN — Medication 10 ML: at 08:37

## 2023-03-03 RX ADMIN — Medication 5 ML: at 21:21

## 2023-03-03 RX ADMIN — ANTI-FUNGAL POWDER MICONAZOLE NITRATE TALC FREE: 1.42 POWDER TOPICAL at 21:13

## 2023-03-03 RX ADMIN — IPRATROPIUM BROMIDE 0.5 MG: 0.5 SOLUTION RESPIRATORY (INHALATION) at 07:15

## 2023-03-03 RX ADMIN — SODIUM ZIRCONIUM CYCLOSILICATE 5 G: 5 POWDER, FOR SUSPENSION ORAL at 21:14

## 2023-03-03 RX ADMIN — ALBUTEROL SULFATE 2.5 MG: 2.5 SOLUTION RESPIRATORY (INHALATION) at 18:58

## 2023-03-03 RX ADMIN — ALBUTEROL SULFATE 2.5 MG: 2.5 SOLUTION RESPIRATORY (INHALATION) at 15:04

## 2023-03-03 RX ADMIN — Medication 5 ML: at 01:00

## 2023-03-03 RX ADMIN — LATANOPROST 1 DROP: 50 SOLUTION OPHTHALMIC at 01:20

## 2023-03-03 RX ADMIN — TRIAMCINOLONE ACETONIDE: 1 CREAM TOPICAL at 01:24

## 2023-03-03 RX ADMIN — IPRATROPIUM BROMIDE 0.5 MG: 0.5 SOLUTION RESPIRATORY (INHALATION) at 15:05

## 2023-03-03 RX ADMIN — Medication 5 MG: at 21:15

## 2023-03-03 RX ADMIN — Medication 10 ML: at 08:35

## 2023-03-03 RX ADMIN — ALBUTEROL SULFATE 2.5 MG: 2.5 SOLUTION RESPIRATORY (INHALATION) at 07:15

## 2023-03-03 RX ADMIN — ANTI-FUNGAL POWDER MICONAZOLE NITRATE TALC FREE: 1.42 POWDER TOPICAL at 01:01

## 2023-03-03 RX ADMIN — ATORVASTATIN CALCIUM 10 MG: 10 TABLET, FILM COATED ORAL at 08:35

## 2023-03-03 RX ADMIN — TRIAMCINOLONE ACETONIDE: 1 CREAM TOPICAL at 08:36

## 2023-03-03 RX ADMIN — HEPARIN 300 UNITS: 100 SYRINGE at 21:14

## 2023-03-03 ASSESSMENT — PAIN DESCRIPTION - DESCRIPTORS
DESCRIPTORS: ACHING;DISCOMFORT

## 2023-03-03 ASSESSMENT — PAIN DESCRIPTION - ORIENTATION
ORIENTATION: RIGHT

## 2023-03-03 ASSESSMENT — PAIN DESCRIPTION - LOCATION
LOCATION: ANKLE

## 2023-03-03 ASSESSMENT — PAIN SCALES - GENERAL
PAINLEVEL_OUTOF10: 0
PAINLEVEL_OUTOF10: 10
PAINLEVEL_OUTOF10: 10
PAINLEVEL_OUTOF10: 0
PAINLEVEL_OUTOF10: 10

## 2023-03-03 ASSESSMENT — PAIN - FUNCTIONAL ASSESSMENT
PAIN_FUNCTIONAL_ASSESSMENT: PREVENTS OR INTERFERES WITH ALL ACTIVE AND SOME PASSIVE ACTIVITIES

## 2023-03-03 ASSESSMENT — PAIN DESCRIPTION - PAIN TYPE
TYPE: SURGICAL PAIN
TYPE: SURGICAL PAIN

## 2023-03-03 NOTE — PROGRESS NOTES
Pulmonary/Critical Care Progress Note    We are following patient for morbid obesity, obstructive sleep apnea, acute, superimposed on chronic hypercapnic respiratory failure, fracture of right ankle status post open reduction of right ankle dislocation followed by internal fixation, open reduction of distal tibia fracture    SUBJECTIVE:  Patient did extremely well with her surgery and today she was out of bed and up in a chair. She is bright and alert and definitely has improved since yesterday. She is tolerating oral azithromycin and aerosolized bronchodilators. Arterial blood gases are adequate in the postoperative period yesterday evening with a pH of 7.37 PCO2 62 PO2 82 using an FiO2 of 55% while on BiPAP.     MEDICATIONS:   sodium zirconium cyclosilicate  5 g Oral BID    enoxaparin  30 mg SubCUTAneous BID    insulin lispro  0-8 Units SubCUTAneous TID WC    insulin lispro  0-4 Units SubCUTAneous Nightly    pantoprazole  40 mg Oral QAM AC    azithromycin  500 mg Oral Daily    latanoprost  1 drop Both Eyes Nightly    triamcinolone   Topical BID    sodium chloride flush  5-40 mL IntraVENous 2 times per day    arformoterol tartrate  15 mcg Nebulization BID    albuterol  2.5 mg Nebulization Q4H WA    And    ipratropium  0.5 mg Nebulization Q4H WA    atorvastatin  10 mg Oral Daily    miconazole   Topical BID    sodium chloride flush  5-40 mL IntraVENous 2 times per day    heparin flush  3 mL IntraVENous 2 times per day      dextrose      sodium chloride       HYDROcodone 5 mg - acetaminophen **OR** HYDROcodone 5 mg - acetaminophen, glucose, dextrose bolus **OR** dextrose bolus, glucagon (rDNA), dextrose, sodium chloride flush, sodium chloride, ondansetron **OR** ondansetron, polyethylene glycol, acetaminophen **OR** acetaminophen, sodium chloride flush, heparin flush      REVIEW OF SYSTEMS:  Constitutional: Denies fever, weight loss, night sweats, and fatigue  Skin: Denies pigmentation, dark lesions, and rashes HEENT: Denies hearing loss, tinnitus, ear drainage, epistaxis, sore throat, and hoarseness. Cardiovascular: Denies palpitations, chest pain, and chest pressure. Respiratory: Denies cough, dyspnea at rest, hemoptysis, apnea, and choking. Gastrointestinal: Denies nausea, vomiting, poor appetite, diarrhea, heartburn or reflux  Genitourinary: Denies dysuria, frequency, urgency or hematuria  Musculoskeletal: Denies myalgias, muscle weakness, and bone pain  Neurological: Denies dizziness, vertigo, headache, and focal weakness  Psychological: Denies anxiety and depression  Endocrine: Denies heat intolerance and cold intolerance  Hematopoietic/Lymphatic: Denies bleeding problems and blood transfusions    OBJECTIVE:  Vitals:    03/03/23 1504   BP:    Pulse:    Resp:    Temp:    SpO2: 95%     FiO2 : 55 %  O2 Flow Rate (L/min): (S) 4 L/min  O2 Device: Nasal cannula    PHYSICAL EXAM:  Constitutional: No fever, chills, diaphoresis  Skin: No skin rash, no skin breakdown  HEENT: Mucous membranes are moist  Neck: No JVD, lymphadenopathy, thyromegaly. Large circumference of neck  Cardiovascular: 1, S2 slightly irregular. No S3 or rubs present  Respiratory: Clear to auscultation bilaterally  Gastrointestinal: Soft, obese, nontender  Genitourinary: No CVA tenderness  Extremities: No clubbing, cyanosis, or edema  Neurological: Awake, alert, oriented x3. No evidence of focal motor or sensory deficits  Psychological: In much better spirits.   Appropriate affect    LABS:  WBC   Date Value Ref Range Status   03/03/2023 10.1 4.5 - 11.5 E9/L Final   03/02/2023 10.2 4.5 - 11.5 E9/L Final   02/28/2023 11.8 (H) 4.5 - 11.5 E9/L Final     Hemoglobin   Date Value Ref Range Status   03/03/2023 8.1 (L) 11.5 - 15.5 g/dL Final   03/02/2023 8.7 (L) 11.5 - 15.5 g/dL Final   02/28/2023 8.6 (L) 11.5 - 15.5 g/dL Final     Hematocrit   Date Value Ref Range Status   03/03/2023 29.5 (L) 34.0 - 48.0 % Final   03/02/2023 32.8 (L) 34.0 - 48.0 % Final 02/28/2023 32.8 (L) 34.0 - 48.0 % Final     MCV   Date Value Ref Range Status   03/03/2023 79.5 (L) 80.0 - 99.9 fL Final   03/02/2023 80.6 80.0 - 99.9 fL Final   02/28/2023 80.6 80.0 - 99.9 fL Final     Platelets   Date Value Ref Range Status   03/03/2023 342 130 - 450 E9/L Final   03/02/2023 353 130 - 450 E9/L Final   02/28/2023 314 130 - 450 E9/L Final     Sodium   Date Value Ref Range Status   03/03/2023 137 132 - 146 mmol/L Final   03/02/2023 136 132 - 146 mmol/L Final   03/02/2023 138 132 - 146 mmol/L Final     Potassium   Date Value Ref Range Status   03/03/2023 5.2 (H) 3.5 - 5.0 mmol/L Final   03/02/2023 5.0 3.5 - 5.0 mmol/L Final   03/02/2023 6.8 (HH) 3.5 - 5.0 mmol/L Final     Potassium reflex Magnesium   Date Value Ref Range Status   02/27/2023 5.1 (H) 3.5 - 5.0 mmol/L Final   02/26/2023 4.7 3.5 - 5.0 mmol/L Final   02/25/2023 3.6 3.5 - 5.0 mmol/L Final     Chloride   Date Value Ref Range Status   03/03/2023 99 98 - 107 mmol/L Final   03/02/2023 95 (L) 98 - 107 mmol/L Final   03/02/2023 94 (L) 98 - 107 mmol/L Final     CO2   Date Value Ref Range Status   03/03/2023 37 (H) 22 - 29 mmol/L Final   03/02/2023 39 (H) 22 - 29 mmol/L Final   03/02/2023 41 (HH) 22 - 29 mmol/L Final     BUN   Date Value Ref Range Status   03/03/2023 22 6 - 23 mg/dL Final   03/02/2023 21 6 - 23 mg/dL Final   03/02/2023 22 6 - 23 mg/dL Final     Creatinine   Date Value Ref Range Status   03/03/2023 0.5 0.5 - 1.0 mg/dL Final   03/02/2023 0.4 (L) 0.5 - 1.0 mg/dL Final   03/02/2023 0.4 (L) 0.5 - 1.0 mg/dL Final     Glucose   Date Value Ref Range Status   03/03/2023 122 (H) 74 - 99 mg/dL Final   03/02/2023 111 (H) 74 - 99 mg/dL Final   03/02/2023 106 (H) 74 - 99 mg/dL Final   03/02/2012 92 70 - 110 mg/dL Final     Calcium   Date Value Ref Range Status   03/03/2023 7.7 (L) 8.6 - 10.2 mg/dL Final   03/02/2023 8.7 8.6 - 10.2 mg/dL Final   03/02/2023 7.3 (L) 8.6 - 10.2 mg/dL Final     Total Protein   Date Value Ref Range Status 02/28/2023 5.6 (L) 6.4 - 8.3 g/dL Final   02/27/2023 5.8 (L) 6.4 - 8.3 g/dL Final   02/26/2023 5.8 (L) 6.4 - 8.3 g/dL Final     Albumin   Date Value Ref Range Status   02/28/2023 2.9 (L) 3.5 - 5.2 g/dL Final   02/27/2023 2.7 (L) 3.5 - 5.2 g/dL Final   02/26/2023 2.7 (L) 3.5 - 5.2 g/dL Final   03/02/2012 4.3 3.2 - 4.8 g/dL Final     Total Bilirubin   Date Value Ref Range Status   02/28/2023 0.4 0.0 - 1.2 mg/dL Final   02/27/2023 0.3 0.0 - 1.2 mg/dL Final   02/26/2023 0.4 0.0 - 1.2 mg/dL Final     Alkaline Phosphatase   Date Value Ref Range Status   02/28/2023 62 35 - 104 U/L Final   02/27/2023 87 35 - 104 U/L Final   02/26/2023 63 35 - 104 U/L Final     AST   Date Value Ref Range Status   02/28/2023 27 0 - 31 U/L Final   02/27/2023 30 0 - 31 U/L Final   02/26/2023 16 0 - 31 U/L Final     ALT   Date Value Ref Range Status   02/28/2023 16 0 - 32 U/L Final   02/27/2023 14 0 - 32 U/L Final   02/26/2023 8 0 - 32 U/L Final     Est, Glom Filt Rate   Date Value Ref Range Status   03/03/2023 >60 >=60 mL/min/1.73 Final     Comment:     Pediatric calculator link  [email or web address]_[email or web address]  Effective Oct 3, 2022  These results are not intended for use in patients  <25years of age. eGFR results are calculated without  a race factor using the 2021 CKD-EPI equation. Careful  clinical correlation is recommended, particularly when  comparing to results calculated using previous equations. The CKD-EPI equation is less accurate in patients with  extremes of muscle mass, extra-renal metabolism of  creatinine, excessive creatinine ingestion, or following  therapy that affects renal tubular secretion. 03/02/2023 >60 >=60 mL/min/1.73 Final     Comment:     Pediatric calculator link  [email or web address]_[email or web address]  Effective Oct 3, 2022  These results are not intended for use in patients  <25years of age.   eGFR results are calculated without  a race factor using the 2021 CKD-EPI equation. Careful  clinical correlation is recommended, particularly when  comparing to results calculated using previous equations. The CKD-EPI equation is less accurate in patients with  extremes of muscle mass, extra-renal metabolism of  creatinine, excessive creatinine ingestion, or following  therapy that affects renal tubular secretion. 03/02/2023 >60 >=60 mL/min/1.73 Final     Comment:     Pediatric calculator link  [email or web address]_[email or web address]  Effective Oct 3, 2022  These results are not intended for use in patients  <25years of age. eGFR results are calculated without  a race factor using the 2021 CKD-EPI equation. Careful  clinical correlation is recommended, particularly when  comparing to results calculated using previous equations. The CKD-EPI equation is less accurate in patients with  extremes of muscle mass, extra-renal metabolism of  creatinine, excessive creatinine ingestion, or following  therapy that affects renal tubular secretion. GFR    Date Value Ref Range Status   04/22/2021 >60  Final   01/14/2021 >60  Final   09/03/2020 >60  Final     Magnesium   Date Value Ref Range Status   03/03/2023 1.9 1.6 - 2.6 mg/dL Final   03/02/2023 1.4 (L) 1.6 - 2.6 mg/dL Final     Phosphorus   Date Value Ref Range Status   02/27/2023 2.0 (L) 2.5 - 4.5 mg/dL Final   02/26/2023 3.3 2.5 - 4.5 mg/dL Final   02/25/2023 2.0 (L) 2.5 - 4.5 mg/dL Final     Recent Labs     03/02/23  2325   PH 7.387   PO2 82.2   PCO2 62.0*   HCO3 36.4*   BE 9.7*   O2SAT 95.4       RADIOLOGY:  XR ANKLE RIGHT (MIN 3 VIEWS)   Final Result   Satisfactory ORIF bimalleolar fracture as described. Fluoro For Surgical Procedures   Final Result   Intraprocedural fluoroscopic spot images as above. See separate procedure   report for more information.          CT ANKLE RIGHT WO CONTRAST   Final Result   Medial and posterior malleoli mildly displaced fractures with anterior   subluxation of the tibiotalar joint.         XR HIP RIGHT (2-3 VIEWS)   Final Result   No acute displaced fractures         XR TIBIA FIBULA RIGHT (2 VIEWS)   Final Result   Right fibular neck fracture and questionable lateral tibial plateau fracture   for which noncontrast CT could be considered.  Fracture dislocation at the   ankle with restoration of ankle and Lucien E post reduction.  Fractures at the   posterior and medial malleoli.         XR KNEE RIGHT (1-2 VIEWS)   Final Result   Right fibular neck fracture and questionable lateral tibial plateau fracture   for which noncontrast CT could be considered.  Fracture dislocation at the   ankle with restoration of ankle and Lucien E post reduction.  Fractures at the   posterior and medial malleoli.         XR ANKLE RIGHT (MIN 3 VIEWS)   Final Result   Right fibular neck fracture and questionable lateral tibial plateau fracture   for which noncontrast CT could be considered.  Fracture dislocation at the   ankle with restoration of ankle and Lucien E post reduction.  Fractures at the   posterior and medial malleoli.         XR ANKLE RIGHT (MIN 3 VIEWS)   Final Result   Medial malleolar fracture with subluxation at the ankle manifest as anterior   widening of the ankle joint.  Questionable fracture at the posterior   malleolus.  See recommendations above.  Heel spurs.         XR CHEST 1 VIEW   Final Result   No active process allowing for rightward rotation.                 PROBLEM LIST:  Principal Problem:    Acute on chronic respiratory failure with hypoxia and hypercapnia ([facility])  Active Problems:    Elevated troponin    Iron deficiency anemia    KIMBERLY (acute kidney injury) ([facility])    Primary hypertension    Pemphigus foliaceous    Acute respiratory failure with hypoxia and hypercapnia ([facility])    Closed fracture of right ankle    Community acquired pneumonia of right upper lobe of lung    Type 2 diabetes mellitus with hyperglycemia, without long-term current use of  insulin ([address]  Resolved Problems:    * No resolved hospital problems. *      IMPRESSION:  Right malleolar and tibial fracture, both repaired  Morbid obesity  Obstructive sleep apnea  Probable cor pulmonale  Asthma  History of systemic hypertension  Previous pneumonia right upper lobe    PLAN:  Continue aerosolized bronchodilators and inhaled steroids  BiPAP at night, nightly  From pulmonary standpoint, the patient is acceptable for discharge to her nursing facility as long as she continues noninvasive ventilation nightly and as needed. We will sign off at this time. Please not hesitate to call if any problems or questions arise.       Electronically signed by Monie Dominguez MD on 3/3/2023 at 4:06 PM

## 2023-03-03 NOTE — OP NOTE
[address]                                OPERATIVE REPORT    PATIENT NAME: Niecy Smith                :        1940  MED REC NO:   [id number]                            ROOM:       4907  ACCOUNT NO:   [de-identified]                           ADMIT DATE: 2023  PROVIDER:     Machelle Nelson    DATE OF PROCEDURE:  2023    PREOPERATIVE DIAGNOSIS:  Right ankle fracture dislocation. POSTOPERATIVE DIAGNOSIS:  Right ankle fracture dislocation. OPERATIONS PERFORMED:  1. Open reduction of right ankle dislocation. 2.  Open reduction internal fixation of right distal tibia fracture with  single medial malleolus screw. 3.  Open reduction of distal tibia fracture with plate and two small  bones, two long bone screws. 4.  Application of plaster splint cast, right ankle and foot. SURGEON:  Machelle Nelson DO    OPERATIVE PROCEDURE:  The patient was brought to the operating theater,  where a general anesthetic was induced by Department of Anesthesiology. The right leg and foot were prepped and draped in the usual sterile  fashion. A tourniquet was applied to the proximal right thigh and  inflated. Medial incision was placed alongside the right ankle fracture  pattern. The dislocation was reduced openly. A single cannulated bone  screw was placed in a retrograde fashion across the medial malleolus. The distal tibial fracture was reduced. A small two-hole plate then  applied to the distal fibula. The two long syndesmotic bone screws was  used across the fibula and tibia. There was _____ fracture dislocation  as well as good placement of all my hardware appreciable on both AP and  lateral views on portable x-ray machine. I irrigated the wound and  dried the wound and closed in layered fashion. EBL was 100 mL.   Sterile  bulky dressing was applied to the right leg and foot followed by a  plaster splint cast.  The patient tolerated the procedure well and she  was transferred back to the recovery room in satisfactory condition.         Michael Stewart    D: 03/02/2023 22:20:38       T: 03/03/2023 3:35:36     [email or web address]  Job#: [id number]     Doc#: [id number]    CC:

## 2023-03-03 NOTE — DISCHARGE INSTRUCTIONS
Orthopedics Discharge Instructions   Weight bearing Status - nonweightbearing- right lower extremity  Pain medication Per Prescription  Ice to operative/injured site for 15-30 minutes of each hour for next 3-5 days    Elevate operative/injured limb on 2 pillows at home  Fracture Care -  If your splint/cast becomes too tight, too loose, wet or damaged please contact our office right away we will need to change out the splint/cast.

## 2023-03-03 NOTE — PLAN OF CARE
Problem: Chronic Conditions and Co-morbidities  Goal: Patient's chronic conditions and co-morbidity symptoms are monitored and maintained or improved  Outcome: Progressing     Problem: Pain  Goal: Verbalizes/displays adequate comfort level or baseline comfort level  Outcome: Progressing     Problem: Skin/Tissue Integrity - Adult  Goal: Incisions, wounds, or drain sites healing without S/S of infection  Outcome: Progressing     Problem: Musculoskeletal - Adult  Goal: Return mobility to safest level of function  Outcome: Progressing     Problem: Musculoskeletal - Adult  Goal: Maintain proper alignment of affected body part  Outcome: Progressing     Problem: Musculoskeletal - Adult  Goal: Return ADL status to a safe level of function  Outcome: Progressing

## 2023-03-03 NOTE — PROGRESS NOTES
Department of Orthopedic Surgery  Resident Progress Note    Patient seen and examined, resting in bed with BiPAP. Pain controlled. No new complaints. Denies chest pain, shortness of breath, dizziness/lightheadedness. Denies numbness or paresthesias in the operative extremity. VITALS:  BP (!) 122/50   Pulse 88   Temp 98.6 °F (37 °C) (Axillary)   Resp 18   Ht 5' 6\" (1.676 m)   Wt 275 lb 9.6 oz (125 kg)   SpO2 99%   BMI 44.48 kg/m²     General: alert and oriented to person, place and time, well-developed and well-nourished, in no acute distress    MUSCULOSKELETAL:   right lower extremity:  Dressing/splint C/D/I  Compartments soft and compressible  Toes moving up and down  Brisk Cap refill, Toes warm and perfused  Distal sensation grossly intact to exposed aspect of foot    CBC:   Lab Results   Component Value Date/Time    WBC 10.2 03/02/2023 06:12 AM    HGB 8.7 03/02/2023 06:12 AM    HCT 32.8 03/02/2023 06:12 AM     03/02/2023 06:12 AM     PT/INR:  No results found for: PROTIME, INR      ASSESSMENT  S/P Right ankle open reduction internal fixation    PLAN      Continue physical therapy and protocol: NWB - RLE  Ice and elevation of the RLE  Maintain splint  24 hour abx coverage  Deep venous thrombosis prophylaxis - lovenox, early mobilization  PT/OT  Pain Control: IV and PO.  Wean to PO as able  Monitor H&H  D/C Planning  Appreciate medicine co-management   Please call with questions or concerns

## 2023-03-03 NOTE — PROGRESS NOTES
[address] [place], [place] [address]ist   ICU Progress Note    Admitting Date and Time: 2/24/2023  4:38 AM  Admit Dx: Elevated troponin [R77.8]  Closed fracture of right ankle, initial encounter [S82.006P]  Acute respiratory failure with hypoxia and hypercapnia (HCC) [J96.01, J96.02]  Acute hypercapnic respiratory failure (HCC) [J96.02]  Anemia, unspecified type [D64.9]  Pain of right hip joint [M25.551]    Subjective/interval history:    2/25: Patient was able to avoid intubation with Bipap. She wore most of yesterday and overnight. She states she is weak. She remembers trying to take dog out to bathroom and got tripped up with door opening and fell. Per RN, Levophed weaned down to 4 mcg/min. Currently on 10 L HFNC. Seen by wound nurse yesterday. 2/26: Patient sitting up in bed. She is feeling better and asking    Per RN:  weaned off Levophed yesterday afternoon. Oxygen down to 6L today. Patient to be transferred to [facility].    2/27: Patient sitting up in bed. Her daughter is present at bedside. Has right ankle pain, but well controlled with as needed analgesics. Still on stress dose IV steroids. 2/28: Patient sitting up in bed, her daughter is at bedside. States she had a rough morning today. RN note reviewed and she had an episode of desaturation and was placed back on BiPAP. Feels better now, breathing comfortably. During encounter was on 5.5 L nasal cannula, I decrease this to 5 L as oxygen saturation in the high 90s. 3/1: Patient sitting up in bed, sleeping but awakens easily to voice. Pain well controlled. Tentatively scheduled for surgical repair of right ankle fracture tomorrow. 3/2: Patient sitting up in bed, sleeping but awakens easily to voice. Pain well controlled. Denies any complaints. Scheduled for surgical repair of ankle fracture today. 3/3: Patient awake, alert, sitting up in bed. Right lower extremity is elevated.   She had ORIF of the right ankle late last night without complication. Breathing comfortably on nasal cannula oxygen today. enoxaparin  30 mg SubCUTAneous BID    ceFAZolin  1,000 mg IntraVENous Q8H    insulin lispro  0-8 Units SubCUTAneous TID WC    insulin lispro  0-4 Units SubCUTAneous Nightly    pantoprazole  40 mg Oral QAM AC    azithromycin  500 mg Oral Daily    latanoprost  1 drop Both Eyes Nightly    triamcinolone   Topical BID    sodium chloride flush  5-40 mL IntraVENous 2 times per day    arformoterol tartrate  15 mcg Nebulization BID    albuterol  2.5 mg Nebulization Q4H WA    And    ipratropium  0.5 mg Nebulization Q4H WA    atorvastatin  10 mg Oral Daily    miconazole   Topical BID    sodium chloride flush  5-40 mL IntraVENous 2 times per day    heparin flush  3 mL IntraVENous 2 times per day     glucose, 4 tablet, PRN  dextrose bolus, 125 mL, PRN   Or  dextrose bolus, 250 mL, PRN  glucagon (rDNA), 1 mg, PRN  dextrose, , Continuous PRN  HYDROcodone 5 mg - acetaminophen, 1 tablet, Q6H PRN  sodium chloride flush, 5-40 mL, PRN  sodium chloride, , PRN  ondansetron, 4 mg, Q8H PRN   Or  ondansetron, 4 mg, Q6H PRN  polyethylene glycol, 17 g, Daily PRN  acetaminophen, 650 mg, Q6H PRN   Or  acetaminophen, 650 mg, Q6H PRN  sodium chloride flush, 5-40 mL, PRN  heparin flush, 3 mL, PRN       Objective:    BP (!) 122/50   Pulse 88   Temp 98.6 °F (37 °C) (Axillary)   Resp 18   Ht 5' 6\" (1.676 m)   Wt 275 lb 9.6 oz (125 kg)   SpO2 99%   BMI 44.48 kg/m²   General Appearance: alert and oriented to person, place and time and in no acute distress. Skin: multiple scabs off arms and legs as well as bod  Head: normocephalic and atraumatic  Eyes: pupils equal, round, and reactive to light, extraocular eye movements intact, conjunctivae normal  Neck: neck supple and non tender without mass   Pulmonary/Chest: Nonlabored on 4 L nasal cannula.   Diminished but otherwise clear to auscultation bilaterally without crackles, rhonchi, or wheezes  Cardiovascular: normal rate, normal S1 and S2 and no carotid bruits  Abdomen: Obese, soft, obese, non-tender, non-distended, normal bowel sounds, no masses or organomegaly  Extremities: Right lower extremity in postoperative soft splint. Mild left lower extremity edema. No left calf tenderness. No cyanosis or clubbing. Brisk capillary refill to toes bilaterally  Neurologic: no cranial nerve deficit and speech normal      Recent Labs     03/02/23  0612 03/02/23  0800 03/03/23  0610    136 137   K 6.8* 5.0 5.2*   CL 94* 95* 99   CO2 41* 39* 37*   BUN 22 21 22   CREATININE 0.4* 0.4* 0.5   GLUCOSE 106* 111* 122*   CALCIUM 7.3* 8.7 7.7*         No results for input(s): ALKPHOS, PROT, LABALBU, BILITOT, AST, ALT in the last 72 hours. Recent Labs     03/02/23  0612 03/03/23  0610   WBC 10.2 10.1   RBC 4.07 3.71   HGB 8.7* 8.1*   HCT 32.8* 29.5*   MCV 80.6 79.5*   MCH 21.4* 21.8*   MCHC 26.5* 27.5*   RDW 19.4* 19.9*    342   MPV 11.2 10.5        Latest Reference Range & Units 2/24/23 05:36 2/24/23 08:02   Pro-BNP 0 - 450 pg/mL 18,200 (H)    Troponin, High Sensitivity 0 - 9 ng/L 66 (H) 51 (H)   (H): Data is abnormally high     Latest Reference Range & Units 2/24/23 05:22 2/24/23 08:18 2/24/23 09:49 2/24/23 12:13 2/24/23 12:46   Source:   Blood Arterial Blood Arterial Blood Arterial Blood Arterial   pH, Blood Gas 7.350 - 7.450   7.168 (LL) 7.206 (LL) 7.193 (LL) 7. 456 (H)   PH (TEMPERATURE CORRECTED) 7.350 - 7.450  7. 182 (LL)       PCO2 35.0 - 45.0 mmHg  117.6 (HH) 98.4 (HH) 105.4 (HH) 52.2 (H)   PCO2 (TEMP CORRECTED) 35.0 - 45.0 mmHg 90.9 (HH)       pO2 75.0 - 100.0 mmHg  76.0 138.1 (H) 71.1 (L) 115.5 (H)   PO2 (TEMP CORRECTED) 60.0 - 80.0 mmHg 104.3 (H)       HCO3 22.0 - 26.0 mmol/L 34.1 (H) 41.7 (H) 38.1 (H) 39.6 (H) 36.0 (H)   Base Excess -3.0 - 3.0 mmol/L 2.4 9.2 (H) 7.0 (H) 8.3 (H) 10.5 (H)   O2 Sat 92.0 - 98.5 % 95.7 89.1 (L) 97.9 87.9 (L) 98.1   THB,THB 11.5 - 16.5 g/dL  12.2 12.1 11.4 (L) 11.3 (L)   O2Hb 94.0 - 97.0 %  88.0 (L) 96.6 87.0 (L) 97.3 (H)   Carboxy-Hgb 0.0 - 1.5 %  0.7 0.7 0.5 0.3   METHB,METHB 0.0 - 1.5 %  0.5 0.6 0.5 0.5   HHb 0.0 - 5.0 %  10.8 (H) 2.1 12.0 (H) 1.9   O2 Content mL/dL  15.2 16.7 14.0 15.6   AaDO2 mmHg  315.1 451. 5 368.6 380.1   RI(T)   4.15 3.27 5.18 3.29   FIO2 % 100.0 75.0 100.0 80.0 80.0   PO2/FIO2 mmHg/%  1.01 1.38 0.89 1.44   (LL): Data is critically low  (HH): Data is critically high  (H): Data is abnormally high  (L): Data is abnormally low    Radiology:   XR ANKLE RIGHT (MIN 3 VIEWS)   Final Result   Satisfactory ORIF bimalleolar fracture as described. Fluoro For Surgical Procedures   Final Result   Intraprocedural fluoroscopic spot images as above. See separate procedure   report for more information. CT ANKLE RIGHT WO CONTRAST   Final Result   Medial and posterior malleoli mildly displaced fractures with anterior   subluxation of the tibiotalar joint. XR HIP RIGHT (2-3 VIEWS)   Final Result   No acute displaced fractures         XR TIBIA FIBULA RIGHT (2 VIEWS)   Final Result   Right fibular neck fracture and questionable lateral tibial plateau fracture   for which noncontrast CT could be considered. Fracture dislocation at the   ankle with restoration of ankle and Lucien E post reduction. Fractures at the   posterior and medial malleoli. XR KNEE RIGHT (1-2 VIEWS)   Final Result   Right fibular neck fracture and questionable lateral tibial plateau fracture   for which noncontrast CT could be considered. Fracture dislocation at the   ankle with restoration of ankle and Lucien E post reduction. Fractures at the   posterior and medial malleoli. XR ANKLE RIGHT (MIN 3 VIEWS)   Final Result   Right fibular neck fracture and questionable lateral tibial plateau fracture   for which noncontrast CT could be considered. Fracture dislocation at the   ankle with restoration of ankle and Lucien E post reduction. Fractures at the   posterior and medial malleoli. XR ANKLE RIGHT (MIN 3 VIEWS)   Final Result   Medial malleolar fracture with subluxation at the ankle manifest as anterior   widening of the ankle joint. Questionable fracture at the posterior   malleolus. See recommendations above. Heel spurs. XR CHEST 1 VIEW   Final Result   No active process allowing for rightward rotation. TTE procedure:Echo Complete W/Doppler & Color Flow. Procedure Date  Date: 02/24/2023 Start: 10:24 AM     Study Location: Portable  Technical Quality: Poor visualization due to body habitus. Indications:Abnormal cardiac enzymes. Patient Status: Routine     Contrast Medium: Definity. Height: 66 inches Weight: 241 pounds BSA: 2.17 m^2 BMI: 38.9 kg/m^2     BP: 106/54 mmHg      Findings      Left Ventricle   Ejection fraction is visually estimated at 60%. No regional wall motion   abnormalities seen. Normal left ventricular wall thickness. Left ventricle   size is normal. Indeterminate diastolic function. Assessment and Plan:  Principal Problem:    Acute on chronic respiratory failure with hypoxia and hypercapnia (HCC)  Active Problems:    Elevated troponin    Iron deficiency anemia    KIMBERLY (acute kidney injury) ([facility] [address]    Primary hypertension    Pemphigus foliaceous    Acute respiratory failure with hypoxia and hypercapnia (HCC)    Closed fracture of right ankle    Community acquired pneumonia of right upper lobe of lung    Type 2 diabetes mellitus with hyperglycemia, without long-term current use of insulin (HCC)  Resolved Problems:    * No resolved hospital problems. *          Acute on chronic hypercapnic and hypoxic respiratory failure   -Pulmonary highly suspects patient has baseline obesity hypoventilation syndrome.  -Intubation was able to be avoided with the use of BiPAP.   Continued use of BiPAP at bedtime and as needed during daytime nap  -Now down to 4 L nasal cannula during the day  -Received IV Solu-Medrol preoperatively per pulmonary recommendation as well as postoperative BiPAP, and has done well from a respiratory standpoint since surgery    2. Acute hypotension  -septic shock versus adrenal insufficiency as on prolong outpatient steroid therapy. Hydrocortisone was weaned and then discontinued on 3/1.   -weaned off Levophed and now off of IV fluids. Blood pressure has been stable over the last several days  -Completed 7 days of IV ceftriaxone, will complete 7 days of oral azithromycin   -PICC line placed R basilic vein 3/65 as patient had poor access and needs pressors. 3. KIMBERLY (resolved)  -creatinine trend 1.2-->1.0-->0.9-->0.6-->0.6-->0.5-->0.4-->0.5    4. Elevated troponin/elevate BNP  -Echo done 2/24 shows EF 60% with indeterminate diastolic function  -elevated troponin likely demand ischemia from acute respiratory distress on admission. 5. Pemiphigus foliaceous/severe candidal skin disease  -topical  triamcinolone 0.1% cream to rash ( but not skin folds)  -miconazole 2% nitrate powder bid. 6. Primary hypertension  -home meds on hold     7. Right ankle fracture s/p fall  -Status post ORIF 3/2  -DVT prophylaxis with enoxaparin    8. Type II diabetes mellitus  -Hemoglobin A1c 6.7, random blood glucose as high as 353. Consistent with new diagnosis of type 2 diabetes mellitus. Blood glucose elevation in this acute setting is higher than would be expected given her A1c, likely reflection of steroid-induced hyperglycemia. Blood glucose improved after discontinuation of steroids. She did receive a dose of Solu-Medrol preoperatively, so we will continue corrective scale for now    9. Mild hyperkalemia  -will give 2 doses of oral Lokelma, recheck tomorrow     10. Disposition  -Plan for subacute rehab. Likely medically ready in the next 24-48hrs     NOTE: This report was transcribed using voice recognition software. Every effort was made to ensure accuracy; however, inadvertent computerized transcription errors may be present.   Electronically signed by Tommie Tejada DO on 3/3/2023 at 8:27 AM

## 2023-03-03 NOTE — DISCHARGE INSTR - COC
Continuity of Care Form    Patient Name: Danita Carbajal   :  1940  MRN:  [id number]    Admit date:  2023  Discharge date:  2023    Code Status Order: Full Code   Advance Directives:   [facility] Documentation       Date/Time Healthcare Directive Type of Healthcare Directive Copy in [facility] [address] Agent's Name Healthcare Agent's Phone Number    [phone] No, patient does not have an advance directive for healthcare treatment -- -- -- -- --            Admitting Physician:  Rosie Lu DO  PCP: Amanda Ruiz MD    Discharging Nurse: [facility] Unit/Room#: 0828/4074-69  Discharging Unit Phone Number: [phone]    Emergency Contact:   Extended Emergency Contact Information  Primary Emergency Contact: Freda Brink  Home Phone: [phone]  Relation: Child   needed? No  Secondary Emergency Contact: [address] Phone: [phone]  Relation: Child   needed? No    Past Surgical History:  Past Surgical History:   Procedure Laterality Date    ANKLE FRACTURE SURGERY Right 3/2/2023    RIGHT ANKLE OPEN REDUCTION INTERNAL FIXATION VS APPLICATION EXTERNAL FIXATION performed by Akiko Hector DO at [facility] Right 2016    right trigger finger middle and ring    CHOLECYSTECTOMY      HYSTERECTOMY (CERVIX STATUS UNKNOWN)         Immunization History: There is no immunization history on file for this patient.     Active Problems:  Patient Active Problem List   Diagnosis Code    Acute on chronic respiratory failure with hypoxia and hypercapnia (HCC) J96.21, J96.22    Elevated troponin R77.8    Iron deficiency anemia D50.9    KIMBERLY (acute kidney injury) ([facility] [address] N17.9    Primary hypertension I10    Pemphigus foliaceous L10.2    Acute respiratory failure with hypoxia and hypercapnia (HCC) J96.01, J96.02    Closed fracture of right ankle S82.891A    [facility] acquired pneumonia of right upper lobe of lung J18.9    Type 2 diabetes mellitus with hyperglycemia, without long-term current use of insulin (HCC) E11.65       Isolation/Infection:   Isolation            No Isolation          Patient Infection Status       Infection Onset Added Last Indicated Last Indicated By Review Planned Expiration Resolved Resolved By    None active    Resolved    COVID-19 (Rule Out) 02/24/23 02/24/23 02/24/23 COVID-19, Rapid (Ordered)   02/24/23 Rule-Out Test Resulted            Nurse Assessment:  Last Vital Signs: /63   Pulse 92   Temp 98.3 °F (36.8 °C) (Oral)   Resp 20   Ht 5' 6\" (1.676 m)   Wt 275 lb 9.6 oz (125 kg)   SpO2 94%   BMI 44.48 kg/m²     Last documented pain score (0-10 scale): Pain Level: 0  Last Weight:   Wt Readings from Last 1 Encounters:   03/03/23 275 lb 9.6 oz (125 kg)     Mental Status:  oriented and alert    IV Access:  - None    Nursing Mobility/ADLs:  Walking   Assisted  Transfer  Assisted  Bathing  Assisted  Dressing  Assisted  Toileting  Assisted  Feeding  Independent  Med Admin  Assisted  Med Delivery   whole    Wound Care Documentation and Therapy:  Wound 02/24/23 Arm Lower;Right;Dorsal (Active)   Wound Image   02/24/23 1518   Wound Etiology Skin Tear 03/03/23 0000   Dressing Status Old drainage noted 03/03/23 0000   Wound Cleansed Cleansed with saline 02/24/23 1600   Dressing/Treatment Other (comment) 03/01/23 1200   Wound Length (cm) 2 cm 02/24/23 1518   Wound Width (cm) 1 cm 02/24/23 1518   Wound Surface Area (cm^2) 2 cm^2 02/24/23 1518   Drainage Amount Scant 03/01/23 1200   Drainage Description Serosanguinous 03/01/23 1200   Odor None 03/03/23 0000   Number of days: 6       Wound 02/24/23 Buttocks Left; Lower (Active)   Wound Image   02/24/23 1518   Wound Etiology Pressure Stage 2 03/02/23 1614   Dressing Status Clean; Intact;Dry 03/02/23 1614   Wound Cleansed Cleansed with saline 02/24/23 1600   Dressing/Treatment Foam 03/02/23 1614   Wound Length (cm) 2 cm 02/24/23 1518   Wound Width (cm) 1 cm 02/24/23 1518   Wound Surface Area (cm^2) 2 cm^2 02/24/23 1518   Drainage Amount Small 03/01/23 1200   Drainage Description Serosanguinous 03/01/23 1200   Odor None 03/01/23 1200   Number of days: 6       Incision 03/02/23 Ankle Anterior;Right (Active)   Dressing Status Clean;Dry; Intact 03/03/23 0000   Dressing/Treatment Xeroform;Gauze dressing/dressing sponge;ABD pad;Cast padding;Splint; Ace wrap 03/02/23 2346   Drainage Amount None 03/02/23 2346   Number of days: 0        Elimination:  Continence: Bowel: No  Bladder: No  Urinary Catheter: None   Colostomy/Ileostomy/Ileal Conduit: No       Date of Last BM: 03/06/2023    Intake/Output Summary (Last 24 hours) at 3/3/2023 1346  Last data filed at 3/3/2023 1315  Gross per 24 hour   Intake 1430 ml   Output 2370 ml   Net -940 ml     I/O last 3 completed shifts: In: 950 [I.V.:950]  Out: 3220 [Urine:3200; Blood:20]    Safety Concerns:     History of Falls (last 30 days)    Impairments/Disabilities:      Vision    Nutrition Therapy:  Current Nutrition Therapy:   - Oral Diet:  Carb Control 5 carbs/meal (2000kcals/day)    Routes of Feeding: Oral  Liquids: Thin Liquids  Daily Fluid Restriction: no  Last Modified Barium Swallow with Video (Video Swallowing Test): not done    Treatments at the Time of Hospital Discharge:   Respiratory Treatments:   Oxygen Therapy:  is on oxygen at 4 L/min per nasal cannula.    Ventilator:    - BiPAP   IPAP: 20 cmH20, CPAP/EPAP: 6 cmH2O only when sleeping 70%    Rehab Therapies: Physical Therapy and Occupational Therapy  Weight Bearing Status/Restrictions: Non-weight bearing on right leg  Other Medical Equipment (for information only, NOT a DME order):  {EQUIPMENT:[id number]}  Other Treatments:     Patient's personal belongings (please select all that are sent with patient):  Glasses    RN SIGNATURE:  Electronically signed by Cory Diallo RN on 3/6/23 at 11:03 AM EST    CASE MANAGEMENT/SOCIAL WORK SECTION    Inpatient Status Date: 2/24/23     Readmission Risk Assessment Score:  Readmission Risk              Risk of Unplanned Readmission:  19           Discharging to Facility/ Agency   Name: [facility], [address], Fax [phone],  Address:  Phone:  Fax:    Dialysis Facility (if applicable)   Name:  Address:  Dialysis Schedule:  Phone:  Fax:    / signature: Electronically signed by JAKUB Stratton on 3/3/23 at 1:46 PM EST    PHYSICIAN SECTION    Prognosis: Good    Condition at Discharge: Stable    Rehab Potential (if transferring to Rehab): Good    Recommended Labs or Other Treatments After Discharge: PT/OT with weightbearing recommendations per orthopedic surgery. Continue DVT prophylaxis with Lovenox until 3/27/2023. Lovenox can be continued longer if clinically indicated (eg patient still not ambulatory). Physician Certification: I certify the above information and transfer of Vita Thompson  is necessary for the continuing treatment of the diagnosis listed and that she requires [facility] for less 30 days.      Update Admission H&P: Changes in H&P as follows - see discharge summary     PHYSICIAN SIGNATURE:  Electronically signed by Emily Fitzpatrick DO on 3/6/2023 at 9:53 AM

## 2023-03-03 NOTE — CARE COORDINATION
SOCIAL WORK / DISCHARGE PLANNING:   WILL NEED RAPID COVID PRIOR TO DC. PT/OT rec [facility]. Sw followed up with [facility] that referrals were made to. [facility] does not have any bed. [facility], [address], Fax [phone], able to accept. PRECERT to be started. JOSE ALBERTO will need signed by physician. HENS form initiated, will need completed when dc order is obtained. Addnendum; 145[facility] Dtr is agreeable to [address] Facility would prefer 5L O2 and will arrange for bipap for use at facility.          Electronically signed by JAKUB Ambrose on 3/3/2023 at 1:42 PM

## 2023-03-03 NOTE — PROGRESS NOTES
Physical Therapy  Physical Therapy Treatment Note/Plan of Care    Room #:  2550/4534-89  Patient Name: Hector Soliman  YOB: 1940  MRN: [id number]    Date of Service: 3/3/2023     Tentative placement recommendation:  [facility] vs [facility]  Equipment recommendation: To be determined      Evaluating Physical Therapist: Dorian Sexton PT, DPT #[id number]      Specific Provider Orders/Date/Referring Provider :     02/24/23 0700    PT evaluation and treat  Start:  02/24/23 0700,   End:  02/24/23 0700,   ONE TIME,   Standing Count:  1 Occurrences,   R         Marlyin Schroeder MD Acknowledge New     Admitting Diagnosis:   Elevated troponin [R77.8]  Closed fracture of right ankle, initial encounter [S82.891A]  Acute respiratory failure with hypoxia and hypercapnia (HCC) [J96.01, J96.02]  Acute hypercapnic respiratory failure (HCC) [J96.02]  Anemia, unspecified type [D64.9]  Pain of right hip joint [M25.551]      Surgery: none  Visit Diagnoses         Codes    Acute respiratory failure with hypoxia and hypercapnia (HCC)    -  Primary J96.01, J96.02    Closed fracture of right ankle, initial encounter     S82.891A    Anemia, unspecified type     D64.9    Pain of right hip joint     M25.551    Acute post-operative pain     G89.18            Patient Active Problem List   Diagnosis    Acute on chronic respiratory failure with hypoxia and hypercapnia (HCC)    Elevated troponin    Iron deficiency anemia    KIMBERLY (acute kidney injury) ([facility] [address]    Primary hypertension    Pemphigus foliaceous    Acute respiratory failure with hypoxia and hypercapnia (HCC)    Closed fracture of right ankle    Community acquired pneumonia of right upper lobe of lung    Type 2 diabetes mellitus with hyperglycemia, without long-term current use of insulin (HCC)        ASSESSMENT of Current Deficits Patient exhibits decreased strength, balance, and endurance impairing functional mobility, transfers, gait , gait distance, and tolerance to activity.  Pt required ModA for bed mobility and was able to perform seated exercises sitting EOB. PHYSICAL THERAPY  PLAN OF CARE       Physical therapy plan of care is established based on physician order,  patient diagnosis and clinical assessment    Current Treatment Recommendations:    -Bed Mobility: Lower extremity exercises  and Trunk control activities   -Sitting Balance: Incorporate reaching activities to activate trunk muscles , Hands on support to maintain midline , Facilitate active trunk muscle engagement , Facilitate postural control in all planes , and Engage in core activities to allow for movement within base of support   -Standing Balance: Perform strengthening exercises in standing to promote motor control with or without upper extremity support , Instruct patient on adequate base of support to maintain balance, and Challenge balance utilizing reaching  activities beyond center of gravity    -Transfers: Provide instruction on proper hand and foot position for adequate transfer of weight onto lower extremities and use of gait device if needed, Cues for hand placement, technique and safety.  Provide stabilization to prevent fall , Facilitate weight shift forward on to lower extremities and provide necessary stabilization of bilateral lower extremities , Support transfer of weight on to lower extremities, and Assist with extension of knees trunk and hip to accept weight transfer   -Gait: Gait training, Standing activities to improve: base of support, weight shift, weight bearing , Exercises to improve trunk control, Exercises to improve hip and knee control, Performance of protected weight bearing activities, and Activities to increase weight bearing   -Endurance: Utilize Supervised activities to increase level of endurance to allow for safe functional mobility including transfers and gait  and Use graduated activities to promote good breathing techniques and provide support and education to maximize respiratory function    PT long term treatment goals are located in below grid    Patient and or family understand(s) diagnosis, prognosis, and plan of care. Frequency of treatments: Patient will be seen  daily. Prior Level of Function: Patient ambulated independently, with cane   Rehab Potential: fair + for baseline    Past medical history:   Past Medical History:   Diagnosis Date    Cancer [facility])     uterus  has hysterectomy    Fracture of right ankle     Hyperlipidemia     Hypertension     Obese      Past Surgical History:   Procedure Laterality Date    ANKLE FRACTURE SURGERY Right 3/2/2023    RIGHT ANKLE OPEN REDUCTION INTERNAL FIXATION VS APPLICATION EXTERNAL FIXATION performed by Saman Alexander DO at [facility] Right 12/05/2016    right trigger finger middle and ring    CHOLECYSTECTOMY      HYSTERECTOMY (CERVIX STATUS UNKNOWN)         SUBJECTIVE:    Precautions: Up with assistance, falls and AMS, NWB through RLE      Social history: Patient lives alone in a ranch home  with Ramp  to enter home.   Walk in shower        Equipment owned: [employer], [employer], and Bedside commode    AM-PAC Basic Mobility       AM-PAC Basic Mobility - Inpatient   How much help is needed turning from your back to your side while in a flat bed without using bedrails?: A Little  How much help is needed moving from lying on your back to sitting on the side of a flat bed without using bedrails?: A Little  How much help is needed moving to and from a bed to a chair?: Total  How much help is needed standing up from a chair using your arms?: Total  How much help is needed walking in hospital room?: Total  How much help is needed climbing 3-5 steps with a railing?: Total  AM-PAC Inpatient Mobility Raw Score : 10  AM-PAC Inpatient T-Scale Score : 32.29  Mobility Inpatient CMS 0-100% Score: 76.75  Mobility Inpatient CMS G-Code Modifier : CL    Nursing cleared patient for PT treatment.      OBJECTIVE;   Initial Evaluation  Date: 2/25/2023 Treatment Date:  3/3/2023     Short Term/ Long Term   Goals   Was pt agreeable to Eval/treatment? Yes Y To be met in 3 days   Pain level   0/10   0/10    Bed Mobility    Rolling: Maximal assist of 1    Supine to sit: Maximal assist of 1    Sit to supine: Maximal assist of 1    Scooting: Maximal assist of 1   Rolling: Moderate assist of 1   Supine to sit: Moderate assist of 1   Sit to supine: Moderate assist of 1   Scooting: Minimal assist of 1    Rolling: Minimal assist of 1    Supine to sit: Minimal assist of 1    Sit to supine: Minimal assist of 1    Scooting: Minimal assist of 1     Transfers Sit to stand: Not assessed  pt declined Sit to stand: Not assessed  unable    Sit to stand: Minimal assist of 1    Ambulation    not assessed     > 15  feet using  wheeled walker with Moderate assist of 1    ROM Within functional limits    Increase range of motion 10% of affected joints    Strength BUE:  refer to OT eval  RLE:  3-/5  LLE:  3-/5  Increase strength in affected mm groups by 1/3 grade   Balance Sitting EOB:  fair   Dynamic Standing:  not assessed  Sitting EOB: fair -  Dynamic Standing: not assessed    Sitting EOB:  fair +  Dynamic Standing: fair with WW     Patient is Alert & Oriented x person, place, time, and situation and follows directions    Sensation:  Patient  denies numbness/tingling   Edema:  no   Endurance: poor +    Vitals:  8 liters high flow nasal cannula   Blood Pressure at rest  Blood Pressure during session     Heart Rate at rest  Heart Rate during session    SPO2 at rest %  SPO2 during session 95-98%     Patient education  Patient educated on role of Physical Therapy, risks of immobility, safety and plan of care, energy conservation,  importance of mobility while in hospital , purse lip breathing, importance and purpose of adaptive device and adjusted to proper height for the patient., safety , and weight bearing status   Patient response to education:   Pt verbalized understanding Pt demonstrated skill Pt requires further education in this area   Yes Partial Yes      Treatment:  Patient practiced and was instructed/facilitated in the following treatment: Patient Sat edge of bed 20 minutes with SBA/Curly to increase dynamic sitting balance and activity tolerance. Pt performed bed mobility, sat EOB and performed seated exercises, scooted toward HOB. Therapeutic Exercises:  ankle pumps, heel raises, hip abduction/adduction, long arc quad, and seated marching  x 15 reps     At end of session, patient in bed with alarm call light and phone within reach,  all lines and tubes intact, nursing notified. Patient would benefit from continued skilled Physical Therapy to improve functional independence and quality of life.          Patient's/ family goals   rehab    Time in    1007  Time out  1037    Total Treatment Time  30 minutes    CPT codes:    Therapeutic activities (72354)   13 minutes  1 unit(s)  Therapeutic exercises (36132)   17 minutes  1 unit(s)    Wimlar Cano, PT

## 2023-03-03 NOTE — ANESTHESIA POSTPROCEDURE EVALUATION
Department of Anesthesiology  Postprocedure Note    Patient: Serafin Delgado  MRN: [id number]  YOB: 1940  Date of evaluation: 3/2/2023      Procedure Summary     Date: 03/02/23 Room / Location: [address] [place], [place] [address] [facility]    Anesthesia Start: 2040 Anesthesia Stop: 2255    Procedure: RIGHT ANKLE OPEN REDUCTION INTERNAL FIXATION VS APPLICATION EXTERNAL FIXATION (Right: Ankle) Diagnosis:       Type I or II open fracture of right ankle, initial encounter      (Type I or II open fracture of right ankle, initial encounter [S82.891B])    Surgeons: Jessica Quezada DO Responsible Provider: Awilda Alvarez MD    Anesthesia Type: general ASA Status: 4          Anesthesia Type: No value filed.     Mau Phase I: Mau Score: 9    Mau Phase II:        Anesthesia Post Evaluation    Patient location during evaluation: PACU  Patient participation: complete - patient participated  Level of consciousness: awake  Airway patency: patent  Nausea & Vomiting: no nausea and no vomiting  Complications: no  Cardiovascular status: hemodynamically stable  Respiratory status: acceptable  Hydration status: euvolemic

## 2023-03-03 NOTE — PROGRESS NOTES
OCCUPATIONAL THERAPY BEDSIDE TREATMENT NOTE   [facility]  [facility] [place]. [place]    Date:3/3/2023  Patient Name: Delia Ochoa  MRN: [id number]  : 1940  Room: [address] [place] [address]        Evaluating OT: Yisel Becton, OTR/L; [id number]        Referring Provider and Orders/Date:   OT eval and treat  Start:  23 0700,   End:  23 0700,   ONE TIME,   Standing Count:  1 Occurrences,   R         Leana Tineo MD         Diagnosis:   1. Acute respiratory failure with hypoxia and hypercapnia (HCC)    2. Closed fracture of right ankle, initial encounter    3. Anemia, unspecified type    4. Elevated troponin    5.  Pain of right hip joint          Surgery: none      Pertinent Medical History:        Past Medical History        Past Medical History:   Diagnosis Date    Cancer [facility])       uterus  has hysterectomy    Fracture of right ankle      Hyperlipidemia      Hypertension      Obese               Past Surgical History         Past Surgical History:   Procedure Laterality Date    APPENDECTOMY        CARPAL TUNNEL RELEASE Right 2016     right trigger finger middle and ring    CHOLECYSTECTOMY        HYSTERECTOMY (CERVIX STATUS UNKNOWN)               Precautions:  Fall Risk, NWB through RLE S/P Right ankle open reduction internal fixation on 3/2/23, A x 2    Recommended placement: subacute rehab    Assessment of current deficits     [x] Functional mobility           [x]ADLs           [x] Strength                  []Cognition     [x] Functional transfers         [x] IADLs         [x] Safety Awareness   [x]Endurance     [] Fine Coordination                        [x] Balance      [] Vision/perception   []Sensation       [x]Gross Motor Coordination            [] ROM           [] Delirium                   [] Motor Control      OT PLAN OF CARE   OT POC based on physician orders, patient diagnosis and results of clinical assessment Frequency/Duration 1-3 days/wk for 2 weeks PRN   Specific OT Treatment Interventions to include:   * Instruction/training on adapted ADL techniques and AE recommendations to increase functional independence within precautions       * Training on energy conservation strategies, correct breathing pattern and techniques to improve independence/tolerance for self-care routine  * Functional transfer/mobility training/DME recommendations for increased independence, safety, and fall prevention  * Patient/Family education to increase follow through with safety techniques and functional independence  * Recommendation of environmental modifications for increased safety with functional transfers/mobility and ADLs  * Therapeutic exercise to improve motor endurance, ROM, and functional strength for ADLs/functional transfers  * Therapeutic activities to facilitate/challenge dynamic balance, stand tolerance for increased safety and independence with ADLs  * Therapeutic activities to facilitate gross/fine motor skills for increased independence with ADLs  * Neuro-muscular re-education: facilitation of righting/equilibrium reactions, midline orientation, scapular stability/mobility, normalization of muscle tone, and facilitation of volitional active controled movement  * Positioning to improve skin integrity, interaction with environment and functional independence      Recommended Adaptive Equipment/DME:  TBD       Home Living: Pt lives alone in a ranch home with ramp to enter. Does not access basement. Can have family support at DC. Lift chair at home.       Bathroom setup: walk in shower     DME owned: cane, ww, rollator, 3:1      Prior Level of Function: indep with ADLs , indep with IADLs; ambulated indep with cane   Driving: no   Occupation: none   Enjoys: TV, playing on tablet, dog-tripped over     Pain Level: unquantified pain with movement of L LE; positioned with pillow for comfort  Cognition: A&O: 4/4; Follows 3 step directions              Memory:  Intact              Sequencing:  fair-              Problem solving:  fair- with anxiety and pain              Judgement/safety:  fair-     AM-PAC Daily Activity - Inpatient   How much help is needed for putting on and taking off regular lower body clothing?: Total  How much help is needed for bathing (which includes washing, rinsing, drying)?: A Lot  How much help is needed for toileting (which includes using toilet, bedpan, or urinal)?: Total  How much help is needed for putting on and taking off regular upper body clothing?: A Lot  How much help is needed for taking care of personal grooming?: A Lot  How much help for eating meals?: A Little  AM-PAC Inpatient Daily Activity Raw Score: 11     Functional Assessment:      Initial Eval Status  Date: 2/27/2023   Treatment Status  Date:3/3/23 STGs = LTGs  Time frame: 10-14 days   Feeding Stand by Assist and set up; Extended time, repositioning for safety/function and education on body mechanics with lunch from upright sitting in supine. Pt required set up of food items and prep of items on tray. All items placed within reach for increased indep and decreased spillage. Pt was able to use utensils and used right UE as dominate hand with self feeding. Good intake and 15% spillage overall. No coughing for choking hazard was present.   Pt able to bring cup and food to mouth Indep   Grooming Moderate Assist from supine level Mod A to don shampoo cap; cuing to bring B hands to wash, towel dry hair; pt able to  brush hair; assist to place hair in ponytail; pt able to wash face with set up assist; pt able to brush dentures with min A for container management   Stand by Assist    UB Dressing Maximal Assist with gown management from supine level Mod A /Max A for gown management to don/doff gowns and tie/untie back of gowns  Minimal Assist    LB Dressing Dependent with R LE in splint/wrap and L LE with heal protector  N/T Moderate Assist    Bathing  Maximal Assist x 2 recommended for rolling and buttocks with pain in side lying  Mod A/Max A; pt able to wash UB when seated in bed; pt able to complete pericare when seated in bed; pt required assist to wash LE's when seated in bed; assist to wash back and buttocks when side rolling in bed    Moderate Assist    Toileting Dependent with A x 2 from supine with bed pan Dep - santoro management  Moderate Assist    Bed Mobility  Max a x 2 for rolling and boost in bed. Frequent repositioning required for comfort and to maintain midline. Use of pillows. Not appropriate to sit EOB Max A for side rolling in bed for hygiene, ADL tasks and to place chux pads on bed ; pt in partial L sidelying position at end of session Supine to sit: Moderate Assist   Sit to supine: Moderate Assist    Functional Transfers  NT due to pt overall debility, decreased activity tolerance, balance deficits, safety and fall risk. NT due to pt overall debility, decreased activity tolerance, balance deficits, safety and fall risk. Maximal Assist x 2   Functional Mobility  NT due to pt overall debility, decreased activity tolerance, balance deficits, safety and fall risk. NT due to pt overall debility, decreased activity tolerance, balance deficits, safety and fall risk. Not appropriate at time of eval. To re-assess at a later time if appropriate.         Balance Sitting:     Static:  NT    Dynamic:NT  Standing: NT Sitting:     Static:  fair (supported in bed with HOB elevated)    Dynamic:NT  Standing: NT  Sitting:     Static:  fair    Dynamic:fair-  Standing: poor   Activity Tolerance Vitals with activity: 3L with poor+ tolerance overall and O2 ranging from 87-92% Fair; pt on 3L O2 during session with SPO2 sats ranging 90-94%  Increase standing tolerance for >3min with stable vital signs for carry over into toileting, functional tranfers and indep in ADLs   Visual/  Perceptual Glasses: Present; WFL     Reports change in vision since admission: No      NA      Hand Dominance  [x] Right   [] Left     Hearing: WFL   Sensation:  No c/o numbness or tingling   Tone: WFL   Edema: R LE; positioning provided     Comments: Patient cleared by nursing staff. Upon arrival pt supine in bed with HOB elevated. Pt agreeable to OT tx session. Pt educated with regards to bed mobility, hand placement, safety awareness,  ADL retraining,  grooming tasks, UE/LE bathing, LE/UE dressing, pericare and rear hygiene, ECT's. At end of session pt seated in bed with HOB elevated in partial L sidelying. All lines and tubes intact, call light within reach. Overall, pt demonstrated decreased independence and safety during completion of ADL/functional transfers/mobility tasks. Pt would benefit from continued skilled OT to increase safety and independence with completion of ADL/IADL tasks for functional independence and quality of life. Pt required cues and education as noted above for safe facilitation and completion of tasks. Therapist provided skilled monitoring of patient's response during treatment session. Prior to and at the end of session, environmental modifications / line management completed for patients safety and efficiency of treatment session. Overall, patient demonstrates moderate difficulties with completion of BADLs and IADLs. Factors contributing to these difficulties include pain in R LE, decreased endurance, and generalized weakness. As noted above, patient likely to benefit from further OT intervention to increase independence, safety, and overall quality of life. Treatment:     Bed mobility: Facilitated bed mobility with cues for proper body mechanics and sequencing to prepare for ADL completion. ADL completion: Self-care retraining for the above-mentioned ADLs; training on proper hand placement, safety technique, sequencing, and energy conservation techniques.   Skilled positioning: Proper positioning to improve interaction with environment, overall functioning and decrease/prevent edema      Pt has made fair/fair minus progress towards set goals    OT 1-3 days/wk for 2 weeks PRN     Treatment Time also includes thorough review of current medical information, gathering information on past medical history/social history and prior level of function, informal observation of tasks, assessment of data and education on plan of care and goals.     Treatment Time In: 9:02 AM    Treatment Time Out: 9:57 AM           Treatment Charges: Mins Units   ADL/Home Mgt     [address] [facility]       Ther Ex                 [address]       Manual Therapy    90304     Neuro Re-ed         81502     Orthotic manage/training                               33382     Non Billable Time     Total Timed Treatment 55 [address], [address]

## 2023-03-04 LAB
ANION GAP SERPL CALCULATED.3IONS-SCNC: 1 MMOL/L (ref 7–16)
ANION GAP SERPL CALCULATED.3IONS-SCNC: 1 MMOL/L (ref 7–16)
BUN BLDV-MCNC: 16 MG/DL (ref 6–23)
BUN BLDV-MCNC: 19 MG/DL (ref 6–23)
CALCIUM SERPL-MCNC: 8.2 MG/DL (ref 8.6–10.2)
CALCIUM SERPL-MCNC: 8.5 MG/DL (ref 8.6–10.2)
CHLORIDE BLD-SCNC: 95 MMOL/L (ref 98–107)
CHLORIDE BLD-SCNC: 95 MMOL/L (ref 98–107)
CO2: 41 MMOL/L (ref 22–29)
CO2: 41 MMOL/L (ref 22–29)
CREAT SERPL-MCNC: 0.5 MG/DL (ref 0.5–1)
CREAT SERPL-MCNC: 0.5 MG/DL (ref 0.5–1)
GFR SERPL CREATININE-BSD FRML MDRD: >60 ML/MIN/1.73
GFR SERPL CREATININE-BSD FRML MDRD: >60 ML/MIN/1.73
GLUCOSE BLD-MCNC: 123 MG/DL (ref 74–99)
GLUCOSE BLD-MCNC: 96 MG/DL (ref 74–99)
METER GLUCOSE: 103 MG/DL (ref 74–99)
METER GLUCOSE: 151 MG/DL (ref 74–99)
METER GLUCOSE: 158 MG/DL (ref 74–99)
METER GLUCOSE: 264 MG/DL (ref 74–99)
METER GLUCOSE: 421 MG/DL (ref 74–99)
METER GLUCOSE: 436 MG/DL (ref 74–99)
POTASSIUM SERPL-SCNC: 4.8 MMOL/L (ref 3.5–5)
POTASSIUM SERPL-SCNC: 5.1 MMOL/L (ref 3.5–5)
SODIUM BLD-SCNC: 137 MMOL/L (ref 132–146)
SODIUM BLD-SCNC: 137 MMOL/L (ref 132–146)

## 2023-03-04 PROCEDURE — 6370000000 HC RX 637 (ALT 250 FOR IP)

## 2023-03-04 PROCEDURE — 2700000000 HC OXYGEN THERAPY PER DAY

## 2023-03-04 PROCEDURE — 6360000002 HC RX W HCPCS

## 2023-03-04 PROCEDURE — 2580000003 HC RX 258

## 2023-03-04 PROCEDURE — 6370000000 HC RX 637 (ALT 250 FOR IP): Performed by: INTERNAL MEDICINE

## 2023-03-04 PROCEDURE — 82962 GLUCOSE BLOOD TEST: CPT

## 2023-03-04 PROCEDURE — 2060000000 HC ICU INTERMEDIATE R&B

## 2023-03-04 PROCEDURE — 80048 BASIC METABOLIC PNL TOTAL CA: CPT

## 2023-03-04 PROCEDURE — 94660 CPAP INITIATION&MGMT: CPT

## 2023-03-04 PROCEDURE — 94640 AIRWAY INHALATION TREATMENT: CPT

## 2023-03-04 PROCEDURE — 99232 SBSQ HOSP IP/OBS MODERATE 35: CPT | Performed by: INTERNAL MEDICINE

## 2023-03-04 PROCEDURE — 36415 COLL VENOUS BLD VENIPUNCTURE: CPT

## 2023-03-04 RX ORDER — INSULIN GLARGINE 100 [IU]/ML
10 INJECTION, SOLUTION SUBCUTANEOUS NIGHTLY
Status: DISCONTINUED | OUTPATIENT
Start: 2023-03-04 | End: 2023-03-05

## 2023-03-04 RX ADMIN — TRIAMCINOLONE ACETONIDE: 1 CREAM TOPICAL at 20:04

## 2023-03-04 RX ADMIN — ARFORMOTEROL TARTRATE 15 MCG: 15 SOLUTION RESPIRATORY (INHALATION) at 18:41

## 2023-03-04 RX ADMIN — HEPARIN 300 UNITS: 100 SYRINGE at 20:25

## 2023-03-04 RX ADMIN — ENOXAPARIN SODIUM 30 MG: 100 INJECTION SUBCUTANEOUS at 09:23

## 2023-03-04 RX ADMIN — Medication 10 ML: at 20:02

## 2023-03-04 RX ADMIN — ALBUTEROL SULFATE 2.5 MG: 2.5 SOLUTION RESPIRATORY (INHALATION) at 10:35

## 2023-03-04 RX ADMIN — HYDROCODONE BITARTRATE AND ACETAMINOPHEN 2 TABLET: 5; 325 TABLET ORAL at 20:20

## 2023-03-04 RX ADMIN — Medication 5 MG: at 20:20

## 2023-03-04 RX ADMIN — AZITHROMYCIN MONOHYDRATE 500 MG: 250 TABLET ORAL at 09:23

## 2023-03-04 RX ADMIN — HEPARIN 300 UNITS: 100 SYRINGE at 09:23

## 2023-03-04 RX ADMIN — INSULIN HUMAN 10 UNITS: 100 INJECTION, SOLUTION PARENTERAL at 17:05

## 2023-03-04 RX ADMIN — Medication 10 ML: at 20:25

## 2023-03-04 RX ADMIN — ALBUTEROL SULFATE 2.5 MG: 2.5 SOLUTION RESPIRATORY (INHALATION) at 06:50

## 2023-03-04 RX ADMIN — TRIAMCINOLONE ACETONIDE: 1 CREAM TOPICAL at 12:46

## 2023-03-04 RX ADMIN — Medication 10 ML: at 09:24

## 2023-03-04 RX ADMIN — ANTI-FUNGAL POWDER MICONAZOLE NITRATE TALC FREE: 1.42 POWDER TOPICAL at 12:47

## 2023-03-04 RX ADMIN — ENOXAPARIN SODIUM 30 MG: 100 INJECTION SUBCUTANEOUS at 20:21

## 2023-03-04 RX ADMIN — IPRATROPIUM BROMIDE 0.5 MG: 0.5 SOLUTION RESPIRATORY (INHALATION) at 14:45

## 2023-03-04 RX ADMIN — IPRATROPIUM BROMIDE 0.5 MG: 0.5 SOLUTION RESPIRATORY (INHALATION) at 06:50

## 2023-03-04 RX ADMIN — ATORVASTATIN CALCIUM 10 MG: 10 TABLET, FILM COATED ORAL at 09:23

## 2023-03-04 RX ADMIN — ANTI-FUNGAL POWDER MICONAZOLE NITRATE TALC FREE: 1.42 POWDER TOPICAL at 20:03

## 2023-03-04 RX ADMIN — HYDROCODONE BITARTRATE AND ACETAMINOPHEN 2 TABLET: 5; 325 TABLET ORAL at 03:54

## 2023-03-04 RX ADMIN — IPRATROPIUM BROMIDE 0.5 MG: 0.5 SOLUTION RESPIRATORY (INHALATION) at 10:35

## 2023-03-04 RX ADMIN — LATANOPROST 1 DROP: 50 SOLUTION OPHTHALMIC at 20:02

## 2023-03-04 RX ADMIN — ALBUTEROL SULFATE 2.5 MG: 2.5 SOLUTION RESPIRATORY (INHALATION) at 18:40

## 2023-03-04 RX ADMIN — ALBUTEROL SULFATE 2.5 MG: 2.5 SOLUTION RESPIRATORY (INHALATION) at 14:45

## 2023-03-04 RX ADMIN — PANTOPRAZOLE SODIUM 40 MG: 40 TABLET, DELAYED RELEASE ORAL at 06:15

## 2023-03-04 RX ADMIN — ARFORMOTEROL TARTRATE 15 MCG: 15 SOLUTION RESPIRATORY (INHALATION) at 06:50

## 2023-03-04 RX ADMIN — INSULIN LISPRO 4 UNITS: 100 INJECTION, SOLUTION INTRAVENOUS; SUBCUTANEOUS at 13:00

## 2023-03-04 RX ADMIN — IPRATROPIUM BROMIDE 0.5 MG: 0.5 SOLUTION RESPIRATORY (INHALATION) at 18:40

## 2023-03-04 RX ADMIN — SODIUM ZIRCONIUM CYCLOSILICATE 5 G: 5 POWDER, FOR SUSPENSION ORAL at 12:46

## 2023-03-04 RX ADMIN — SODIUM ZIRCONIUM CYCLOSILICATE 5 G: 5 POWDER, FOR SUSPENSION ORAL at 20:31

## 2023-03-04 ASSESSMENT — PAIN DESCRIPTION - ORIENTATION
ORIENTATION: RIGHT

## 2023-03-04 ASSESSMENT — PAIN SCALES - WONG BAKER: WONGBAKER_NUMERICALRESPONSE: 0

## 2023-03-04 ASSESSMENT — PAIN SCALES - GENERAL
PAINLEVEL_OUTOF10: 0
PAINLEVEL_OUTOF10: 7
PAINLEVEL_OUTOF10: 6
PAINLEVEL_OUTOF10: 0

## 2023-03-04 ASSESSMENT — PAIN DESCRIPTION - FREQUENCY
FREQUENCY: CONTINUOUS
FREQUENCY: INTERMITTENT

## 2023-03-04 ASSESSMENT — PAIN DESCRIPTION - DESCRIPTORS
DESCRIPTORS: ACHING;DISCOMFORT

## 2023-03-04 ASSESSMENT — PAIN DESCRIPTION - LOCATION
LOCATION: ANKLE
LOCATION: ANKLE
LOCATION: ANKLE;KNEE

## 2023-03-04 ASSESSMENT — PAIN - FUNCTIONAL ASSESSMENT
PAIN_FUNCTIONAL_ASSESSMENT: PREVENTS OR INTERFERES WITH ALL ACTIVE AND SOME PASSIVE ACTIVITIES

## 2023-03-04 ASSESSMENT — PAIN DESCRIPTION - PAIN TYPE
TYPE: SURGICAL PAIN
TYPE: SURGICAL PAIN

## 2023-03-04 NOTE — PLAN OF CARE
Problem: Chronic Conditions and Co-morbidities  Goal: Patient's chronic conditions and co-morbidity symptoms are monitored and maintained or improved  Outcome: Progressing  Flowsheets  Taken 3/4/2023 0730 by Umm Garcia 33 - Patient's Chronic Conditions and Co-Morbidity Symptoms are Monitored and Maintained or Improved: Monitor and assess patient's chronic conditions and comorbid symptoms for stability, deterioration, or improvement  Taken 3/3/2023 2100 by Umm Garcia - Patient's Chronic Conditions and Co-Morbidity Symptoms are Monitored and Maintained or Improved: Monitor and assess patient's chronic conditions and comorbid symptoms for stability, deterioration, or improvement     Problem: Pain  Goal: Verbalizes/displays adequate comfort level or baseline comfort level  Outcome: Progressing  Flowsheets  Taken 3/4/2023 0330 by Rigoberto Mcneil RN  Verbalizes/displays adequate comfort level or baseline comfort level:   Administer analgesics based on type and severity of pain and evaluate response   Assess pain using appropriate pain scale   Encourage patient to monitor pain and request assistance   Implement non-pharmacological measures as appropriate and evaluate response  Taken 3/3/2023 2100 by Rigoberto Mcneil RN  Verbalizes/displays adequate comfort level or baseline comfort level:   Encourage patient to monitor pain and request assistance   Assess pain using appropriate pain scale     Problem: Skin/Tissue Integrity - Adult  Goal: Incisions, wounds, or drain sites healing without S/S of infection  Outcome: Progressing  Flowsheets  Taken 3/4/2023 0730 by Janet Hernandez RN  Incisions, Wounds, or Drain Sites Healing Without Sign and Symptoms of Infection: TWICE DAILY: Assess and document skin integrity  Taken 3/3/2023 2100 by Rigoberto Mcneil RN  Incisions, Wounds, or Drain Sites Healing Without Sign and Symptoms of Infection: TWICE DAILY: Assess and document skin integrity     Problem: Musculoskeletal - Adult  Goal: Return mobility to safest level of function  Outcome: Progressing  Flowsheets  Taken 3/4/2023 0730 by Deon Montgomery RN  Return Mobility to Safest Level of Function:   Ensure adequate protection for wounds/incisions during mobilization   Assist with transfers and ambulation using safe patient handling equipment as needed  Taken 3/3/2023 2100 by Dominique Miller RN  Return Mobility to Safest Level of Function: Assess patient stability and activity tolerance for standing, transferring and ambulating with or without assistive devices  Goal: Maintain proper alignment of affected body part  Outcome: Progressing  Flowsheets  Taken 3/4/2023 0730 by Deon Montgomery RN  Maintain proper alignment of affected body part:   Support and protect limb and body alignment per provider's orders   Instruct and reinforce with patient and family use of appropriate assistive device and precautions (e.g. spinal or hip dislocation precautions)  Taken 3/3/2023 2100 by Dominique Miller RN  Maintain proper alignment of affected body part: Support and protect limb and body alignment per provider's orders  Goal: Return ADL status to a safe level of function  Outcome: Progressing  Flowsheets  Taken 3/4/2023 0730 by Deon Montgomery RN  Return ADL Status to a Safe Level of Function:   Obtain physical therapy/occupational therapy consults as needed   Administer medication as ordered  Taken 3/3/2023 2100 by Dominique Miller RN  Return ADL Status to a Safe Level of Function: Administer medication as ordered

## 2023-03-04 NOTE — PROGRESS NOTES
Department of Orthopedic Surgery  Resident Progress Note    POD 2. Patient seen and examined. Pain controlled patient states that her ankle is feeling much better than it was prior to surgery. No new complaints. Denies chest pain, shortness of breath, dizziness/lightheadedness. VITALS:  BP (!) 120/56   Pulse 75   Temp 98 °F (36.7 °C) (Axillary)   Resp 21   Ht 5' 6\" (1.676 m)   Wt 269 lb 9.6 oz (122.3 kg)   SpO2 97%   BMI 43.51 kg/m²     General: Awake alert oriented x3    MUSCULOSKELETAL:   right lower extremity:  Dressing C/D/I  Compartments soft and compressible  Unable to assess plantarflexion and dorsiflexion secondary to splint patient does have extensor hallux longus intact  +2/4 DP pulses, Brisk Cap refill, Toes warm and perfused  Distal sensation grossly intact to exposed aspect of foot    CBC:   Lab Results   Component Value Date/Time    WBC 10.1 03/03/2023 06:10 AM    HGB 8.1 03/03/2023 06:10 AM    HCT 29.5 03/03/2023 06:10 AM     03/03/2023 06:10 AM     PT/INR:  No results found for: PROTIME, INR    ASSESSMENT  S/P open reduction internal fixation of the right ankle on 3-2-2023  PLAN    Continue physical therapy and protocol: NWB - RLE  Ice and elevation of the RLE  Maintain splint  24 hour abx coverage completed  Deep venous thrombosis prophylaxis - lovenox, early mobilization  PT/OT  Pain Control: IV and PO.  Wean to PO as able  Monitor H&H  D/C Planning  Appreciate medicine co-management   Please call with questions or concerns

## 2023-03-04 NOTE — PROGRESS NOTES
[address] [place], [place] [address]   ICU Progress Note    Admitting Date and Time: 2/24/2023  4:38 AM  Admit Dx: Elevated troponin [R77.8]  Closed fracture of right ankle, initial encounter [S82.192O]  Acute respiratory failure with hypoxia and hypercapnia (HCC) [J96.01, J96.02]  Acute hypercapnic respiratory failure (HCC) [J96.02]  Anemia, unspecified type [D64.9]  Pain of right hip joint [M25.551]    Subjective/interval history:    2/25: Patient was able to avoid intubation with Bipap. She wore most of yesterday and overnight. She states she is weak. She remembers trying to take dog out to bathroom and got tripped up with door opening and fell. Per RN, Levophed weaned down to 4 mcg/min. Currently on 10 L HFNC. Seen by wound nurse yesterday. 2/26: Patient sitting up in bed. She is feeling better and asking    Per RN:  weaned off Levophed yesterday afternoon. Oxygen down to 6L today. Patient to be transferred to [facility].    2/27: Patient sitting up in bed. Her daughter is present at bedside. Has right ankle pain, but well controlled with as needed analgesics. Still on stress dose IV steroids. 2/28: Patient sitting up in bed, her daughter is at bedside. States she had a rough morning today. RN note reviewed and she had an episode of desaturation and was placed back on BiPAP. Feels better now, breathing comfortably. During encounter was on 5.5 L nasal cannula, I decrease this to 5 L as oxygen saturation in the high 90s. 3/1: Patient sitting up in bed, sleeping but awakens easily to voice. Pain well controlled. Tentatively scheduled for surgical repair of right ankle fracture tomorrow. 3/2: Patient sitting up in bed, sleeping but awakens easily to voice. Pain well controlled. Denies any complaints. Scheduled for surgical repair of ankle fracture today. 3/3: Patient awake, alert, sitting up in bed. Right lower extremity is elevated.   She had ORIF of the right ankle late last night without complication. Breathing comfortably on nasal cannula oxygen today. 3/4: Patient sitting up in bed, awake, alert. Her son is present at bedside. Postoperative pain well controlled. sodium zirconium cyclosilicate  5 g Oral BID    melatonin  5 mg Oral Nightly    enoxaparin  30 mg SubCUTAneous BID    insulin lispro  0-8 Units SubCUTAneous TID WC    insulin lispro  0-4 Units SubCUTAneous Nightly    pantoprazole  40 mg Oral QAM AC    azithromycin  500 mg Oral Daily    latanoprost  1 drop Both Eyes Nightly    triamcinolone   Topical BID    sodium chloride flush  5-40 mL IntraVENous 2 times per day    arformoterol tartrate  15 mcg Nebulization BID    albuterol  2.5 mg Nebulization Q4H WA    And    ipratropium  0.5 mg Nebulization Q4H WA    atorvastatin  10 mg Oral Daily    miconazole   Topical BID    sodium chloride flush  5-40 mL IntraVENous 2 times per day    heparin flush  3 mL IntraVENous 2 times per day     HYDROcodone 5 mg - acetaminophen, 1 tablet, Q6H PRN   Or  HYDROcodone 5 mg - acetaminophen, 2 tablet, Q6H PRN  glucose, 4 tablet, PRN  dextrose bolus, 125 mL, PRN   Or  dextrose bolus, 250 mL, PRN  glucagon (rDNA), 1 mg, PRN  dextrose, , Continuous PRN  sodium chloride flush, 5-40 mL, PRN  sodium chloride, , PRN  ondansetron, 4 mg, Q8H PRN   Or  ondansetron, 4 mg, Q6H PRN  polyethylene glycol, 17 g, Daily PRN  acetaminophen, 650 mg, Q6H PRN   Or  acetaminophen, 650 mg, Q6H PRN  sodium chloride flush, 5-40 mL, PRN  heparin flush, 3 mL, PRN       Objective:    BP (!) 111/56   Pulse 77   Temp 97.9 °F (36.6 °C) (Oral)   Resp 20   Ht 5' 6\" (1.676 m)   Wt 269 lb 9.6 oz (122.3 kg)   SpO2 95%   BMI 43.51 kg/m²   General Appearance: alert and oriented to person, place and time and in no acute distress.   Skin: multiple scabs off arms and legs as well as bod  Head: normocephalic and atraumatic  Eyes: pupils equal, round, and reactive to light, extraocular eye movements intact, conjunctivae normal  Neck: neck supple and non tender without mass   Pulmonary/Chest: Nonlabored on 4 L nasal cannula. Diminished but otherwise clear to auscultation bilaterally without crackles, rhonchi, or wheezes  Cardiovascular: normal rate, normal S1 and S2 and no carotid bruits  Abdomen: Obese, soft, obese, non-tender, non-distended, normal bowel sounds, no masses or organomegaly  Extremities: Right lower extremity in postoperative soft splint. Mild left lower extremity edema. No left calf tenderness. No cyanosis or clubbing. Brisk capillary refill to toes bilaterally  Neurologic: no cranial nerve deficit and speech normal      Recent Labs     03/02/23  0800 03/03/23  0610 03/04/23  0520    137 137   K 5.0 5.2* 5.1*   CL 95* 99 95*   CO2 39* 37* 41*   BUN 21 22 19   CREATININE 0.4* 0.5 0.5   GLUCOSE 111* 122* 123*   CALCIUM 8.7 7.7* 8.2*         No results for input(s): ALKPHOS, PROT, LABALBU, BILITOT, AST, ALT in the last 72 hours. Recent Labs     03/02/23  0612 03/03/23  0610   WBC 10.2 10.1   RBC 4.07 3.71   HGB 8.7* 8.1*   HCT 32.8* 29.5*   MCV 80.6 79.5*   MCH 21.4* 21.8*   MCHC 26.5* 27.5*   RDW 19.4* 19.9*    342   MPV 11.2 10.5        Latest Reference Range & Units 2/24/23 05:36 2/24/23 08:02   Pro-BNP 0 - 450 pg/mL 18,200 (H)    Troponin, High Sensitivity 0 - 9 ng/L 66 (H) 51 (H)   (H): Data is abnormally high     Latest Reference Range & Units 2/24/23 05:22 2/24/23 08:18 2/24/23 09:49 2/24/23 12:13 2/24/23 12:46   Source:   Blood Arterial Blood Arterial Blood Arterial Blood Arterial   pH, Blood Gas 7.350 - 7.450   7.168 (LL) 7.206 (LL) 7.193 (LL) 7. 456 (H)   PH (TEMPERATURE CORRECTED) 7.350 - 7.450  7. 182 (LL)       PCO2 35.0 - 45.0 mmHg  117.6 (HH) 98.4 (HH) 105.4 (HH) 52.2 (H)   PCO2 (TEMP CORRECTED) 35.0 - 45.0 mmHg 90.9 (HH)       pO2 75.0 - 100.0 mmHg  76.0 138.1 (H) 71.1 (L) 115.5 (H)   PO2 (TEMP CORRECTED) 60.0 - 80.0 mmHg 104.3 (H)       HCO3 22.0 - 26.0 mmol/L 34.1 (H) 41.7 (H) 38.1 (H) 39.6 (H) 36.0 (H)   Base Excess -3.0 - 3.0 mmol/L 2.4 9.2 (H) 7.0 (H) 8.3 (H) 10.5 (H)   O2 Sat 92.0 - 98.5 % 95.7 89.1 (L) 97.9 87.9 (L) 98.1   THB,THB 11.5 - 16.5 g/dL  12.2 12.1 11.4 (L) 11.3 (L)   O2Hb 94.0 - 97.0 %  88.0 (L) 96.6 87.0 (L) 97.3 (H)   Carboxy-Hgb 0.0 - 1.5 %  0.7 0.7 0.5 0.3   METHB,METHB 0.0 - 1.5 %  0.5 0.6 0.5 0.5   HHb 0.0 - 5.0 %  10.8 (H) 2.1 12.0 (H) 1.9   O2 Content mL/dL  15.2 16.7 14.0 15.6   AaDO2 mmHg  315.1 451. 5 368.6 380.1   RI(T)   4.15 3.27 5.18 3.29   FIO2 % 100.0 75.0 100.0 80.0 80.0   PO2/FIO2 mmHg/%  1.01 1.38 0.89 1.44   (LL): Data is critically low  (HH): Data is critically high  (H): Data is abnormally high  (L): Data is abnormally low    Radiology:   XR ANKLE RIGHT (MIN 3 VIEWS)   Final Result   Satisfactory ORIF bimalleolar fracture as described. Fluoro For Surgical Procedures   Final Result   Intraprocedural fluoroscopic spot images as above. See separate procedure   report for more information. CT ANKLE RIGHT WO CONTRAST   Final Result   Medial and posterior malleoli mildly displaced fractures with anterior   subluxation of the tibiotalar joint. XR HIP RIGHT (2-3 VIEWS)   Final Result   No acute displaced fractures         XR TIBIA FIBULA RIGHT (2 VIEWS)   Final Result   Right fibular neck fracture and questionable lateral tibial plateau fracture   for which noncontrast CT could be considered. Fracture dislocation at the   ankle with restoration of ankle and Lucien E post reduction. Fractures at the   posterior and medial malleoli. XR KNEE RIGHT (1-2 VIEWS)   Final Result   Right fibular neck fracture and questionable lateral tibial plateau fracture   for which noncontrast CT could be considered. Fracture dislocation at the   ankle with restoration of ankle and Lucien E post reduction. Fractures at the   posterior and medial malleoli.          XR ANKLE RIGHT (MIN 3 VIEWS)   Final Result   Right fibular neck fracture and questionable lateral tibial plateau fracture   for which noncontrast CT could be considered.  Fracture dislocation at the   ankle with restoration of ankle and Lucien E post reduction.  Fractures at the   posterior and medial malleoli.         XR ANKLE RIGHT (MIN 3 VIEWS)   Final Result   Medial malleolar fracture with subluxation at the ankle manifest as anterior   widening of the ankle joint.  Questionable fracture at the posterior   malleolus.  See recommendations above.  Heel spurs.         XR CHEST 1 VIEW   Final Result   No active process allowing for rightward rotation.            TTE procedure:Echo Complete W/Doppler & Color Flow.     Procedure Date  Date: 02/24/2023 Start: 10:24 AM     Study Location: Portable  Technical Quality: Poor visualization due to body habitus.     Indications:Abnormal cardiac enzymes.     Patient Status: Routine     Contrast Medium: Definity.     Height: 66 inches Weight: 241 pounds BSA: 2.17 m^2 BMI: 38.9 kg/m^2     BP: 106/54 mmHg      Findings      Left Ventricle   Ejection fraction is visually estimated at 60%. No regional wall motion   abnormalities seen. Normal left ventricular wall thickness. Left ventricle   size is normal. Indeterminate diastolic function.     Assessment and Plan:  Principal Problem:    Acute on chronic respiratory failure with hypoxia and hypercapnia (HCC)  Active Problems:    Elevated troponin    Iron deficiency anemia    KIMBERLY (acute kidney injury) (HCC)    Primary hypertension    Pemphigus foliaceous    Acute respiratory failure with hypoxia and hypercapnia (HCC)    Closed fracture of right ankle    Community acquired pneumonia of right upper lobe of lung    Type 2 diabetes mellitus with hyperglycemia, without long-term current use of insulin (HCC)  Resolved Problems:    * No resolved hospital problems. *          Acute on chronic hypercapnic and hypoxic respiratory failure   -Pulmonary highly suspects patient has baseline obesity hypoventilation syndrome.  -Intubation was able to be  avoided with the use of BiPAP. Continued use of BiPAP at bedtime and as needed during daytime nap  -Now down to 4 L nasal cannula during the day  -Received IV Solu-Medrol preoperatively per pulmonary recommendation as well as postoperative BiPAP, and has done well from a respiratory standpoint since surgery    2. Acute hypotension  -septic shock versus adrenal insufficiency as on prolong outpatient steroid therapy. Hydrocortisone was weaned and then discontinued on 3/1.   -weaned off Levophed and now off of IV fluids. Blood pressure has been stable over the last several days  -Completed 7 days of IV ceftriaxone and oral azithromycin  -PICC line placed R basilic vein 0/83 as patient had poor access and needs pressors. 3. KIMBERLY (resolved)  -creatinine trend 1.2-->1.0-->0.9-->0.6-->0.6-->0.5-->0.4-->0.5-->0.5    4. Elevated troponin/elevate BNP  -Echo done 2/24 shows EF 60% with indeterminate diastolic function  -elevated troponin likely demand ischemia from acute respiratory distress on admission. 5. Pemiphigus foliaceous/severe candidal skin disease  -topical  triamcinolone 0.1% cream to rash ( but not skin folds)  -miconazole 2% nitrate powder bid. 6. Primary hypertension  -home meds on hold     7. Right ankle fracture s/p fall  -Status post ORIF 3/2  -DVT prophylaxis with enoxaparin    8. Type II diabetes mellitus  -Hemoglobin A1c 6.7, random blood glucose as high as 353. Consistent with new diagnosis of type 2 diabetes mellitus. Blood glucose elevation in this acute setting is higher than would be expected given her A1c, likely reflection of steroid-induced hyperglycemia. Blood glucose improved after discontinuation of steroids. She did receive a dose of Solu-Medrol preoperatively, so we will continue corrective scale for now    9. Mild hyperkalemia  -Continue Lokelma    10. Disposition  -Plan for subacute rehab. Medically stable for discharge.   Awaiting  pre-CERT    NOTE: This report was transcribed using voice recognition software. Every effort was made to ensure accuracy; however, inadvertent computerized transcription errors may be present.      Electronically signed by Emily Fitzpatrick DO on 3/4/2023 at 8:40 AM

## 2023-03-05 PROBLEM — E87.5 HYPERKALEMIA: Status: ACTIVE | Noted: 2023-03-05

## 2023-03-05 LAB
ANION GAP SERPL CALCULATED.3IONS-SCNC: 1 MMOL/L (ref 7–16)
BUN BLDV-MCNC: 14 MG/DL (ref 6–23)
CALCIUM SERPL-MCNC: 8.1 MG/DL (ref 8.6–10.2)
CHLORIDE BLD-SCNC: 93 MMOL/L (ref 98–107)
CO2: 38 MMOL/L (ref 22–29)
CREAT SERPL-MCNC: 0.5 MG/DL (ref 0.5–1)
GFR SERPL CREATININE-BSD FRML MDRD: >60 ML/MIN/1.73
GLUCOSE BLD-MCNC: 142 MG/DL (ref 74–99)
HBA1C MFR BLD: 6.3 % (ref 4–5.6)
METER GLUCOSE: 124 MG/DL (ref 74–99)
METER GLUCOSE: 154 MG/DL (ref 74–99)
METER GLUCOSE: 179 MG/DL (ref 74–99)
METER GLUCOSE: 193 MG/DL (ref 74–99)
METER GLUCOSE: 195 MG/DL (ref 74–99)
POTASSIUM SERPL-SCNC: 4.3 MMOL/L (ref 3.5–5)
SODIUM BLD-SCNC: 132 MMOL/L (ref 132–146)

## 2023-03-05 PROCEDURE — 36415 COLL VENOUS BLD VENIPUNCTURE: CPT

## 2023-03-05 PROCEDURE — 2700000000 HC OXYGEN THERAPY PER DAY

## 2023-03-05 PROCEDURE — 6370000000 HC RX 637 (ALT 250 FOR IP)

## 2023-03-05 PROCEDURE — 6360000002 HC RX W HCPCS

## 2023-03-05 PROCEDURE — 6370000000 HC RX 637 (ALT 250 FOR IP): Performed by: INTERNAL MEDICINE

## 2023-03-05 PROCEDURE — 99232 SBSQ HOSP IP/OBS MODERATE 35: CPT | Performed by: INTERNAL MEDICINE

## 2023-03-05 PROCEDURE — 83036 HEMOGLOBIN GLYCOSYLATED A1C: CPT

## 2023-03-05 PROCEDURE — 82962 GLUCOSE BLOOD TEST: CPT

## 2023-03-05 PROCEDURE — 80048 BASIC METABOLIC PNL TOTAL CA: CPT

## 2023-03-05 PROCEDURE — 94660 CPAP INITIATION&MGMT: CPT

## 2023-03-05 PROCEDURE — 94640 AIRWAY INHALATION TREATMENT: CPT

## 2023-03-05 PROCEDURE — 2580000003 HC RX 258

## 2023-03-05 PROCEDURE — 2060000000 HC ICU INTERMEDIATE R&B

## 2023-03-05 RX ADMIN — TRIAMCINOLONE ACETONIDE: 1 CREAM TOPICAL at 21:53

## 2023-03-05 RX ADMIN — ALBUTEROL SULFATE 2.5 MG: 2.5 SOLUTION RESPIRATORY (INHALATION) at 10:06

## 2023-03-05 RX ADMIN — ALBUTEROL SULFATE 2.5 MG: 2.5 SOLUTION RESPIRATORY (INHALATION) at 06:23

## 2023-03-05 RX ADMIN — ARFORMOTEROL TARTRATE 15 MCG: 15 SOLUTION RESPIRATORY (INHALATION) at 18:15

## 2023-03-05 RX ADMIN — ARFORMOTEROL TARTRATE 15 MCG: 15 SOLUTION RESPIRATORY (INHALATION) at 06:23

## 2023-03-05 RX ADMIN — ENOXAPARIN SODIUM 30 MG: 100 INJECTION SUBCUTANEOUS at 11:09

## 2023-03-05 RX ADMIN — Medication 10 ML: at 21:52

## 2023-03-05 RX ADMIN — HEPARIN 300 UNITS: 100 SYRINGE at 11:10

## 2023-03-05 RX ADMIN — ACETAMINOPHEN 650 MG: 325 TABLET ORAL at 11:09

## 2023-03-05 RX ADMIN — TRIAMCINOLONE ACETONIDE: 1 CREAM TOPICAL at 11:08

## 2023-03-05 RX ADMIN — Medication 5 MG: at 21:51

## 2023-03-05 RX ADMIN — ANTI-FUNGAL POWDER MICONAZOLE NITRATE TALC FREE: 1.42 POWDER TOPICAL at 21:52

## 2023-03-05 RX ADMIN — ENOXAPARIN SODIUM 30 MG: 100 INJECTION SUBCUTANEOUS at 21:51

## 2023-03-05 RX ADMIN — ANTI-FUNGAL POWDER MICONAZOLE NITRATE TALC FREE: 1.42 POWDER TOPICAL at 11:11

## 2023-03-05 RX ADMIN — LATANOPROST 1 DROP: 50 SOLUTION OPHTHALMIC at 21:52

## 2023-03-05 RX ADMIN — SODIUM ZIRCONIUM CYCLOSILICATE 5 G: 5 POWDER, FOR SUSPENSION ORAL at 11:09

## 2023-03-05 RX ADMIN — HYDROCODONE BITARTRATE AND ACETAMINOPHEN 2 TABLET: 5; 325 TABLET ORAL at 21:51

## 2023-03-05 RX ADMIN — HEPARIN 300 UNITS: 100 SYRINGE at 21:51

## 2023-03-05 RX ADMIN — Medication 10 ML: at 21:53

## 2023-03-05 RX ADMIN — PANTOPRAZOLE SODIUM 40 MG: 40 TABLET, DELAYED RELEASE ORAL at 06:12

## 2023-03-05 RX ADMIN — ATORVASTATIN CALCIUM 10 MG: 10 TABLET, FILM COATED ORAL at 11:10

## 2023-03-05 RX ADMIN — IPRATROPIUM BROMIDE 0.5 MG: 0.5 SOLUTION RESPIRATORY (INHALATION) at 18:16

## 2023-03-05 RX ADMIN — IPRATROPIUM BROMIDE 0.5 MG: 0.5 SOLUTION RESPIRATORY (INHALATION) at 06:23

## 2023-03-05 RX ADMIN — IPRATROPIUM BROMIDE 0.5 MG: 0.5 SOLUTION RESPIRATORY (INHALATION) at 10:06

## 2023-03-05 RX ADMIN — IPRATROPIUM BROMIDE 0.5 MG: 0.5 SOLUTION RESPIRATORY (INHALATION) at 15:00

## 2023-03-05 RX ADMIN — POLYETHYLENE GLYCOL 3350 17 G: 17 POWDER, FOR SOLUTION ORAL at 11:09

## 2023-03-05 RX ADMIN — ALBUTEROL SULFATE 2.5 MG: 2.5 SOLUTION RESPIRATORY (INHALATION) at 18:15

## 2023-03-05 RX ADMIN — ALBUTEROL SULFATE 2.5 MG: 2.5 SOLUTION RESPIRATORY (INHALATION) at 15:00

## 2023-03-05 ASSESSMENT — PAIN SCALES - WONG BAKER
WONGBAKER_NUMERICALRESPONSE: 10
WONGBAKER_NUMERICALRESPONSE: 0

## 2023-03-05 ASSESSMENT — PAIN - FUNCTIONAL ASSESSMENT: PAIN_FUNCTIONAL_ASSESSMENT: PREVENTS OR INTERFERES SOME ACTIVE ACTIVITIES AND ADLS

## 2023-03-05 ASSESSMENT — PAIN DESCRIPTION - ORIENTATION: ORIENTATION: RIGHT

## 2023-03-05 ASSESSMENT — PAIN DESCRIPTION - LOCATION: LOCATION: BACK;ANKLE

## 2023-03-05 ASSESSMENT — PAIN DESCRIPTION - DESCRIPTORS: DESCRIPTORS: ACHING;DISCOMFORT

## 2023-03-05 ASSESSMENT — PAIN DESCRIPTION - PAIN TYPE: TYPE: SURGICAL PAIN

## 2023-03-05 ASSESSMENT — PAIN SCALES - GENERAL
PAINLEVEL_OUTOF10: 9
PAINLEVEL_OUTOF10: 0
PAINLEVEL_OUTOF10: 0

## 2023-03-05 ASSESSMENT — PAIN DESCRIPTION - FREQUENCY: FREQUENCY: INTERMITTENT

## 2023-03-05 NOTE — PLAN OF CARE
Problem: Chronic Conditions and Co-morbidities  Goal: Patient's chronic conditions and co-morbidity symptoms are monitored and maintained or improved  3/5/2023 1453 by Howard Hansen RN  Outcome: Progressing  Flowsheets (Taken 3/5/2023 1435)  Care Plan - Patient's Chronic Conditions and Co-Morbidity Symptoms are Monitored and Maintained or Improved: Monitor and assess patient's chronic conditions and comorbid symptoms for stability, deterioration, or improvement  3/5/2023 0629 by Vicky Hughes RN  Outcome: Progressing  Flowsheets (Taken 3/4/2023 1958 by Rambo Young RN)  Care Plan - Patient's Chronic Conditions and Co-Morbidity Symptoms are Monitored and Maintained or Improved: Monitor and assess patient's chronic conditions and comorbid symptoms for stability, deterioration, or improvement     Problem: Pain  Goal: Verbalizes/displays adequate comfort level or baseline comfort level  3/5/2023 1453 by Howard Hansen RN  Outcome: Progressing  3/5/2023 0629 by Vicky Hughes RN  Outcome: Progressing     Problem: Skin/Tissue Integrity - Adult  Goal: Incisions, wounds, or drain sites healing without S/S of infection  3/5/2023 1453 by Howard Hansen RN  Outcome: Progressing  Flowsheets (Taken 3/5/2023 1435)  Incisions, Wounds, or Drain Sites Healing Without Sign and Symptoms of Infection:   TWICE DAILY: Assess and document skin integrity   TWICE DAILY: Assess and document dressing/incision, wound bed, drain sites and surrounding tissue  3/5/2023 0629 by Vicky Hughes RN  Outcome: Progressing  [address] (Taken 3/4/2023 1958 by Rambo Young RN)  Incisions, Wounds, or Drain Sites Healing Without Sign and Symptoms of Infection: TWICE DAILY: Assess and document skin integrity     Problem: Musculoskeletal - Adult  Goal: Return mobility to safest level of function  3/5/2023 1453 by Howard Hansen RN  Outcome: Progressing  Flowsheets (Taken 3/5/2023 1435)  Return Mobility to [facility] Level of Function:   Assess patient stability and activity tolerance for standing, transferring and ambulating with or without assistive devices   Assist with transfers and ambulation using safe patient handling equipment as needed   Ensure adequate protection for wounds/incisions during mobilization  3/5/2023 0629 by Won Caruso RN  Outcome: Progressing  Flowsheets (Taken 3/4/2023 1958 by Lay Sun RN)  Return Mobility to Safest Level of Function: Ensure adequate protection for wounds/incisions during mobilization  Goal: Maintain proper alignment of affected body part  3/5/2023 1453 by Francisco Garcia RN  Outcome: Progressing  3/5/2023 0629 by Won Caruso RN  Outcome: Progressing  Flowsheets (Taken 3/4/2023 1958 by Lay Sun RN)  Maintain proper alignment of affected body part: Support and protect limb and body alignment per provider's orders  Goal: Return ADL status to a safe level of function  3/5/2023 1453 by Francisco Garcia RN  Outcome: Progressing  3/5/2023 0629 by Won Caruso RN  Outcome: Progressing  Flowsheets (Taken 3/4/2023 1958 by Lay Sun RN)  Return ADL Status to a Safe Level of Function: Administer medication as ordered

## 2023-03-05 NOTE — PROGRESS NOTES
[address] [place], [place] [address]ist   ICU Progress Note    Admitting Date and Time: 2/24/2023  4:38 AM  Admit Dx: Elevated troponin [R77.8]  Closed fracture of right ankle, initial encounter [S82.421Z]  Acute respiratory failure with hypoxia and hypercapnia (HCC) [J96.01, J96.02]  Acute hypercapnic respiratory failure (HCC) [J96.02]  Anemia, unspecified type [D64.9]  Pain of right hip joint [M25.551]    Subjective/interval history:    2/25: Patient was able to avoid intubation with Bipap. She wore most of yesterday and overnight. She states she is weak. She remembers trying to take dog out to bathroom and got tripped up with door opening and fell. Per RN, Levophed weaned down to 4 mcg/min. Currently on 10 L HFNC. Seen by wound nurse yesterday. 2/26: Patient sitting up in bed. She is feeling better and asking    Per RN:  weaned off Levophed yesterday afternoon. Oxygen down to 6L today. Patient to be transferred to [address] [place] [address].    2/27: Patient sitting up in bed. Her daughter is present at bedside. Has right ankle pain, but well controlled with as needed analgesics. Still on stress dose IV steroids. 2/28: Patient sitting up in bed, her daughter is at bedside. States she had a rough morning today. RN note reviewed and she had an episode of desaturation and was placed back on BiPAP. Feels better now, breathing comfortably. During encounter was on 5.5 L nasal cannula, I decrease this to 5 L as oxygen saturation in the high 90s. 3/1: Patient sitting up in bed, sleeping but awakens easily to voice. Pain well controlled. Tentatively scheduled for surgical repair of right ankle fracture tomorrow. 3/2: Patient sitting up in bed, sleeping but awakens easily to voice. Pain well controlled. Denies any complaints. Scheduled for surgical repair of ankle fracture today. 3/3: Patient awake, alert, sitting up in bed. Right lower extremity is elevated.   She had ORIF of the right ankle late last night without complication.  Breathing comfortably on nasal cannula oxygen today.    3/4: Patient sitting up in bed, awake, alert.  Her son is present at bedside.  Postoperative pain well controlled.    3/5: Patient is tearful and anxious.  She states she is eager to get out of the hospital, and just wants to go home.  Plan is for skilled nursing facility prior to returning home; discussed this with patient this morning and she is still agreeable to this.  Denies any shortness of breath at rest.  Postoperative pain well controlled.     sodium zirconium cyclosilicate  5 g Oral TID    [Held by provider] insulin glargine  10 Units SubCUTAneous Nightly    melatonin  5 mg Oral Nightly    enoxaparin  30 mg SubCUTAneous BID    insulin lispro  0-8 Units SubCUTAneous TID WC    insulin lispro  0-4 Units SubCUTAneous Nightly    pantoprazole  40 mg Oral QAM AC    latanoprost  1 drop Both Eyes Nightly    triamcinolone   Topical BID    sodium chloride flush  5-40 mL IntraVENous 2 times per day    arformoterol tartrate  15 mcg Nebulization BID    albuterol  2.5 mg Nebulization Q4H WA    And    ipratropium  0.5 mg Nebulization Q4H WA    atorvastatin  10 mg Oral Daily    miconazole   Topical BID    sodium chloride flush  5-40 mL IntraVENous 2 times per day    heparin flush  3 mL IntraVENous 2 times per day     HYDROcodone 5 mg - acetaminophen, 1 tablet, Q6H PRN   Or  HYDROcodone 5 mg - acetaminophen, 2 tablet, Q6H PRN  glucose, 4 tablet, PRN  dextrose bolus, 125 mL, PRN   Or  dextrose bolus, 250 mL, PRN  glucagon (rDNA), 1 mg, PRN  dextrose, , Continuous PRN  sodium chloride flush, 5-40 mL, PRN  sodium chloride, , PRN  ondansetron, 4 mg, Q8H PRN   Or  ondansetron, 4 mg, Q6H PRN  polyethylene glycol, 17 g, Daily PRN  acetaminophen, 650 mg, Q6H PRN   Or  acetaminophen, 650 mg, Q6H PRN  sodium chloride flush, 5-40 mL, PRN  heparin flush, 3 mL, PRN       Objective:    BP (!) 126/55   Pulse 87   Temp 98.8 °F (37.1 °C) (Oral)   Resp 23   Ht 5' 6\"  (1.676 m)   Wt 290 lb (131.5 kg)   SpO2 94%   BMI 46.81 kg/m²   General Appearance: alert and oriented to person, place and time and in no acute distress. Skin: multiple scabs off arms and legs as well as bod  Head: normocephalic and atraumatic  Eyes: pupils equal, round, and reactive to light, extraocular eye movements intact, conjunctivae normal  Neck: neck supple and non tender without mass   Pulmonary/Chest: Nonlabored on 4 L nasal cannula. Diminished but otherwise clear to auscultation bilaterally without crackles, rhonchi, or wheezes  Cardiovascular: normal rate, normal S1 and S2 and no carotid bruits  Abdomen: Obese, soft, obese, non-tender, non-distended, normal bowel sounds, no masses or organomegaly  Extremities: Right lower extremity in postoperative soft splint. Mild left lower extremity edema. No left calf tenderness. No cyanosis or clubbing. Brisk capillary refill to toes bilaterally  Neurologic: no cranial nerve deficit and speech normal      Recent Labs     03/03/23  0610 03/04/23  0520 03/04/23 2014    137 137   K 5.2* 5.1* 4.8   CL 99 95* 95*   CO2 37* 41* 41*   BUN 22 19 16   CREATININE 0.5 0.5 0.5   GLUCOSE 122* 123* 96   CALCIUM 7.7* 8.2* 8.5*         No results for input(s): ALKPHOS, PROT, LABALBU, BILITOT, AST, ALT in the last 72 hours. Recent Labs     03/03/23  0610   WBC 10.1   RBC 3.71   HGB 8.1*   HCT 29.5*   MCV 79.5*   MCH 21.8*   MCHC 27.5*   RDW 19.9*      MPV 10.5        Latest Reference Range & Units 2/24/23 05:36 2/24/23 08:02   Pro-BNP 0 - 450 pg/mL 18,200 (H)    Troponin, High Sensitivity 0 - 9 ng/L 66 (H) 51 (H)   (H): Data is abnormally high     Latest Reference Range & Units 2/24/23 05:22 2/24/23 08:18 2/24/23 09:49 2/24/23 12:13 2/24/23 12:46   Source:   Blood Arterial Blood Arterial Blood Arterial Blood Arterial   pH, Blood Gas 7.350 - 7.450   7.168 (LL) 7.206 (LL) 7.193 (LL) 7. 456 (H)   PH (TEMPERATURE CORRECTED) 7.350 - 7.450  7. 182 (LL) PCO2 35.0 - 45.0 mmHg  117.6 (HH) 98.4 (HH) 105.4 (HH) 52.2 (H)   PCO2 (TEMP CORRECTED) 35.0 - 45.0 mmHg 90.9 (HH)       pO2 75.0 - 100.0 mmHg  76.0 138.1 (H) 71.1 (L) 115.5 (H)   PO2 (TEMP CORRECTED) 60.0 - 80.0 mmHg 104.3 (H)       HCO3 22.0 - 26.0 mmol/L 34.1 (H) 41.7 (H) 38.1 (H) 39.6 (H) 36.0 (H)   Base Excess -3.0 - 3.0 mmol/L 2.4 9.2 (H) 7.0 (H) 8.3 (H) 10.5 (H)   O2 Sat 92.0 - 98.5 % 95.7 89.1 (L) 97.9 87.9 (L) 98.1   THB,THB 11.5 - 16.5 g/dL  12.2 12.1 11.4 (L) 11.3 (L)   O2Hb 94.0 - 97.0 %  88.0 (L) 96.6 87.0 (L) 97.3 (H)   Carboxy-Hgb 0.0 - 1.5 %  0.7 0.7 0.5 0.3   METHB,METHB 0.0 - 1.5 %  0.5 0.6 0.5 0.5   HHb 0.0 - 5.0 %  10.8 (H) 2.1 12.0 (H) 1.9   O2 Content mL/dL  15.2 16.7 14.0 15.6   AaDO2 mmHg  315.1 451. 5 368.6 380.1   RI(T)   4.15 3.27 5.18 3.29   FIO2 % 100.0 75.0 100.0 80.0 80.0   PO2/FIO2 mmHg/%  1.01 1.38 0.89 1.44   (LL): Data is critically low  (HH): Data is critically high  (H): Data is abnormally high  (L): Data is abnormally low    Radiology:   XR ANKLE RIGHT (MIN 3 VIEWS)   Final Result   Satisfactory ORIF bimalleolar fracture as described. Fluoro For Surgical Procedures   Final Result   Intraprocedural fluoroscopic spot images as above. See separate procedure   report for more information. CT ANKLE RIGHT WO CONTRAST   Final Result   Medial and posterior malleoli mildly displaced fractures with anterior   subluxation of the tibiotalar joint. XR HIP RIGHT (2-3 VIEWS)   Final Result   No acute displaced fractures         XR TIBIA FIBULA RIGHT (2 VIEWS)   Final Result   Right fibular neck fracture and questionable lateral tibial plateau fracture   for which noncontrast CT could be considered. Fracture dislocation at the   ankle with restoration of ankle and Lucien E post reduction. Fractures at the   posterior and medial malleoli.          XR KNEE RIGHT (1-2 VIEWS)   Final Result   Right fibular neck fracture and questionable lateral tibial plateau fracture   for which noncontrast CT could be considered. Fracture dislocation at the   ankle with restoration of ankle and Lucien E post reduction. Fractures at the   posterior and medial malleoli. XR ANKLE RIGHT (MIN 3 VIEWS)   Final Result   Right fibular neck fracture and questionable lateral tibial plateau fracture   for which noncontrast CT could be considered. Fracture dislocation at the   ankle with restoration of ankle and Lucien E post reduction. Fractures at the   posterior and medial malleoli. XR ANKLE RIGHT (MIN 3 VIEWS)   Final Result   Medial malleolar fracture with subluxation at the ankle manifest as anterior   widening of the ankle joint. Questionable fracture at the posterior   malleolus. See recommendations above. Heel spurs. XR CHEST 1 VIEW   Final Result   No active process allowing for rightward rotation. TTE procedure:Echo Complete W/Doppler & Color Flow. Procedure Date  Date: 02/24/2023 Start: 10:24 AM     Study Location: Portable  Technical Quality: Poor visualization due to body habitus. Indications:Abnormal cardiac enzymes. Patient Status: Routine     Contrast Medium: Definity. Height: 66 inches Weight: 241 pounds BSA: 2.17 m^2 BMI: 38.9 kg/m^2     BP: 106/54 mmHg      Findings      Left Ventricle   Ejection fraction is visually estimated at 60%. No regional wall motion   abnormalities seen. Normal left ventricular wall thickness. Left ventricle   size is normal. Indeterminate diastolic function.      Assessment and Plan:  Principal Problem:    Acute on chronic respiratory failure with hypoxia and hypercapnia (HCC)  Active Problems:    Elevated troponin    Iron deficiency anemia    KIMBERLY (acute kidney injury) ([facility] [address]    Primary hypertension    Pemphigus foliaceous    Acute respiratory failure with hypoxia and hypercapnia (HCC)    Closed fracture of right ankle    Community acquired pneumonia of right upper lobe of lung    Type 2 diabetes mellitus with hyperglycemia, without long-term current use of insulin ([address]  Resolved Problems:    * No resolved hospital problems. *          Acute on chronic hypercapnic and hypoxic respiratory failure   -Pulmonary highly suspects patient has baseline obesity hypoventilation syndrome.  -Intubation was able to be avoided with the use of BiPAP. Continued use of BiPAP at bedtime and as needed during daytime nap  -Now down to 4 L nasal cannula during the day  -Received IV Solu-Medrol preoperatively per pulmonary recommendation as well as postoperative BiPAP, and has done well from a respiratory standpoint since surgery    2. Acute hypotension  -septic shock versus adrenal insufficiency as on prolong outpatient steroid therapy. Hydrocortisone was weaned and then discontinued on 3/1.   -weaned off Levophed and now off of IV fluids. Blood pressure has been stable over the last several days  -Completed 7 days of IV ceftriaxone and oral azithromycin  -PICC line placed R basilic vein 8/66 as patient had poor access and needs pressors. 3. KIMBERLY (resolved)  -creatinine trend 1.2-->1.0-->0.9-->0.6-->0.6-->0.5-->0.4-->0.5-->0.5-->labs pending     4. Elevated troponin/elevate BNP  -Echo done 2/24 shows EF 60% with indeterminate diastolic function  -elevated troponin likely demand ischemia from acute respiratory distress on admission. 5. Pemiphigus foliaceous/severe candidal skin disease  -topical  triamcinolone 0.1% cream to rash ( but not skin folds)  -miconazole 2% nitrate powder bid. 6. Primary hypertension  -home meds on hold     7. Right ankle fracture s/p fall  -Status post ORIF 3/2  -DVT prophylaxis with enoxaparin    8. Type II diabetes mellitus  -Hemoglobin A1c 6.7, random blood glucose as high as 353. Consistent with new diagnosis of type 2 diabetes mellitus. Blood glucose elevation yesterday much higher than would be expected with an A1c of 6.7. May be due to physiologic stressors as well as preoperative steroids.   She was given insulin regular 10 units in addition to her corrective scale  Humalog yesterday 3/4 and blood glucose came down appropriately. Repeat A1c was done to rule out lab error, and it is 6.3 which is fairly consistent with prior done earlier this admission.  -Continue corrective scale for now, will defer to primary care physician on initiating metformin. Start carbohydrate controlled/consistent diet    9. Mild hyperkalemia  -Continue Lokelma. Repeat labs for today pending    10. Disposition  -Plan for subacute rehab. Medically stable for discharge. Awaiting  pre-CERT    NOTE: This report was transcribed using voice recognition software. Every effort was made to ensure accuracy; however, inadvertent computerized transcription errors may be present.      Electronically signed by Marco Archuleta DO on 3/5/2023 at 8:19 AM

## 2023-03-05 NOTE — PROGRESS NOTES
Department of Orthopedic Surgery  Resident Progress Note    POD 3. Patient seen and examined. Pain controlled patient states that her right ankle is feeling similar to what it was yesterday, with still some pain noted. No new complaints. Denies chest pain, shortness of breath, dizziness/lightheadedness. VITALS:  BP (!) 126/55   Pulse 87   Temp 98.8 °F (37.1 °C) (Oral)   Resp 23   Ht 5' 6\" (1.676 m)   Wt 290 lb (131.5 kg)   SpO2 94%   BMI 46.81 kg/m²     General: Awake alert oriented x3    MUSCULOSKELETAL:   right lower extremity:  Dressing C/D/I  Compartments soft and compressible  Unable to assess plantarflexion and dorsiflexion secondary to splint patient does have extensor hallux longus intact  +2/4 DP pulses, Brisk Cap refill, Toes warm and perfused  Distal sensation grossly intact to exposed aspect of foot    CBC:   Lab Results   Component Value Date/Time    WBC 10.1 03/03/2023 06:10 AM    HGB 8.1 03/03/2023 06:10 AM    HCT 29.5 03/03/2023 06:10 AM     03/03/2023 06:10 AM     PT/INR:  No results found for: PROTIME, INR    ASSESSMENT  S/P open reduction internal fixation of the right ankle on 3-2-2023  PLAN    Continue physical therapy and protocol: NWB - RLE  Ice and elevation of the RLE  Maintain splint  24 hour abx coverage completed  Deep venous thrombosis prophylaxis - lovenox, early mobilization  PT/OT  Pain Control: IV and PO.  Wean to PO as able  Monitor H&H  D/C Planning  Appreciate medicine co-management   Please call with questions or concerns   Orthopedic surgery will follow from periphery at this point

## 2023-03-06 VITALS
DIASTOLIC BLOOD PRESSURE: 71 MMHG | HEIGHT: 66 IN | TEMPERATURE: 98.4 F | HEART RATE: 81 BPM | SYSTOLIC BLOOD PRESSURE: 122 MMHG | WEIGHT: 290.4 LBS | OXYGEN SATURATION: 98 % | BODY MASS INDEX: 46.67 KG/M2 | RESPIRATION RATE: 22 BRPM

## 2023-03-06 LAB
ANION GAP SERPL CALCULATED.3IONS-SCNC: 1 MMOL/L (ref 7–16)
BUN BLDV-MCNC: 14 MG/DL (ref 6–23)
CALCIUM SERPL-MCNC: 8.4 MG/DL (ref 8.6–10.2)
CHLORIDE BLD-SCNC: 95 MMOL/L (ref 98–107)
CO2: 40 MMOL/L (ref 22–29)
CREAT SERPL-MCNC: 0.4 MG/DL (ref 0.5–1)
GFR SERPL CREATININE-BSD FRML MDRD: >60 ML/MIN/1.73
GLUCOSE BLD-MCNC: 108 MG/DL (ref 74–99)
METER GLUCOSE: 120 MG/DL (ref 74–99)
METER GLUCOSE: 159 MG/DL (ref 74–99)
POTASSIUM SERPL-SCNC: 4.3 MMOL/L (ref 3.5–5)
SARS-COV-2, NAAT: NOT DETECTED
SODIUM BLD-SCNC: 136 MMOL/L (ref 132–146)

## 2023-03-06 PROCEDURE — 87635 SARS-COV-2 COVID-19 AMP PRB: CPT

## 2023-03-06 PROCEDURE — 6360000002 HC RX W HCPCS

## 2023-03-06 PROCEDURE — 6370000000 HC RX 637 (ALT 250 FOR IP): Performed by: INTERNAL MEDICINE

## 2023-03-06 PROCEDURE — 80048 BASIC METABOLIC PNL TOTAL CA: CPT

## 2023-03-06 PROCEDURE — 2700000000 HC OXYGEN THERAPY PER DAY

## 2023-03-06 PROCEDURE — 36415 COLL VENOUS BLD VENIPUNCTURE: CPT

## 2023-03-06 PROCEDURE — 94660 CPAP INITIATION&MGMT: CPT

## 2023-03-06 PROCEDURE — 99239 HOSP IP/OBS DSCHRG MGMT >30: CPT | Performed by: INTERNAL MEDICINE

## 2023-03-06 PROCEDURE — 82962 GLUCOSE BLOOD TEST: CPT

## 2023-03-06 PROCEDURE — 94640 AIRWAY INHALATION TREATMENT: CPT

## 2023-03-06 PROCEDURE — 6370000000 HC RX 637 (ALT 250 FOR IP)

## 2023-03-06 RX ORDER — PANTOPRAZOLE SODIUM 40 MG/1
40 TABLET, DELAYED RELEASE ORAL
Qty: 30 TABLET | Refills: 0 | DISCHARGE
Start: 2023-03-07

## 2023-03-06 RX ORDER — ALBUTEROL SULFATE 90 UG/1
2 AEROSOL, METERED RESPIRATORY (INHALATION) 4 TIMES DAILY PRN
Qty: 18 G | Refills: 0 | DISCHARGE
Start: 2023-03-06

## 2023-03-06 RX ORDER — MECOBALAMIN 5000 MCG
5 TABLET,DISINTEGRATING ORAL NIGHTLY
DISCHARGE
Start: 2023-03-06

## 2023-03-06 RX ORDER — ENOXAPARIN SODIUM 100 MG/ML
30 INJECTION SUBCUTANEOUS 2 TIMES DAILY
Qty: 60 EACH | Refills: 0 | DISCHARGE
Start: 2023-03-06 | End: 2023-03-27

## 2023-03-06 RX ORDER — HYDROCODONE BITARTRATE AND ACETAMINOPHEN 5; 325 MG/1; MG/1
1 TABLET ORAL EVERY 6 HOURS PRN
Qty: 12 TABLET | Refills: 0 | Status: SHIPPED | OUTPATIENT
Start: 2023-03-06 | End: 2023-03-09

## 2023-03-06 RX ORDER — ATORVASTATIN CALCIUM 10 MG/1
10 TABLET, FILM COATED ORAL DAILY
Qty: 30 TABLET | Refills: 0 | Status: SHIPPED | OUTPATIENT
Start: 2023-03-06

## 2023-03-06 RX ORDER — FLUTICASONE PROPIONATE AND SALMETEROL 250; 50 UG/1; UG/1
1 POWDER RESPIRATORY (INHALATION) EVERY 12 HOURS
Qty: 60 EACH | Refills: 3 | DISCHARGE
Start: 2023-03-06

## 2023-03-06 RX ADMIN — ALBUTEROL SULFATE 2.5 MG: 2.5 SOLUTION RESPIRATORY (INHALATION) at 05:17

## 2023-03-06 RX ADMIN — HYDROCODONE BITARTRATE AND ACETAMINOPHEN 2 TABLET: 5; 325 TABLET ORAL at 04:05

## 2023-03-06 RX ADMIN — ATORVASTATIN CALCIUM 10 MG: 10 TABLET, FILM COATED ORAL at 11:36

## 2023-03-06 RX ADMIN — IPRATROPIUM BROMIDE 0.5 MG: 0.5 SOLUTION RESPIRATORY (INHALATION) at 05:17

## 2023-03-06 RX ADMIN — ARFORMOTEROL TARTRATE 15 MCG: 15 SOLUTION RESPIRATORY (INHALATION) at 05:17

## 2023-03-06 RX ADMIN — ENOXAPARIN SODIUM 30 MG: 100 INJECTION SUBCUTANEOUS at 11:36

## 2023-03-06 RX ADMIN — PANTOPRAZOLE SODIUM 40 MG: 40 TABLET, DELAYED RELEASE ORAL at 06:27

## 2023-03-06 RX ADMIN — ANTI-FUNGAL POWDER MICONAZOLE NITRATE TALC FREE: 1.42 POWDER TOPICAL at 11:36

## 2023-03-06 RX ADMIN — TRIAMCINOLONE ACETONIDE: 1 CREAM TOPICAL at 10:58

## 2023-03-06 ASSESSMENT — PAIN DESCRIPTION - DESCRIPTORS: DESCRIPTORS: ACHING;DISCOMFORT

## 2023-03-06 ASSESSMENT — PAIN DESCRIPTION - PAIN TYPE: TYPE: SURGICAL PAIN;ACUTE PAIN

## 2023-03-06 ASSESSMENT — PAIN DESCRIPTION - FREQUENCY: FREQUENCY: INTERMITTENT

## 2023-03-06 ASSESSMENT — PAIN SCALES - WONG BAKER
WONGBAKER_NUMERICALRESPONSE: 10
WONGBAKER_NUMERICALRESPONSE: 0

## 2023-03-06 ASSESSMENT — PAIN SCALES - GENERAL
PAINLEVEL_OUTOF10: 10
PAINLEVEL_OUTOF10: 0

## 2023-03-06 ASSESSMENT — PAIN - FUNCTIONAL ASSESSMENT: PAIN_FUNCTIONAL_ASSESSMENT: PREVENTS OR INTERFERES WITH ALL ACTIVE AND SOME PASSIVE ACTIVITIES

## 2023-03-06 ASSESSMENT — PAIN DESCRIPTION - ORIENTATION: ORIENTATION: RIGHT

## 2023-03-06 NOTE — DISCHARGE SUMMARY
[facility] Physician Discharge Summary       [address] [address] [place] [address]  [phone]    Follow up in 2 week(s)  Repeat XRs and evaluation, For suture removal    [facility]  [address] [facility] [address] [phone]          Activity level: Nonweightbearing right lower extremity for the time being per orthopedic surgery recommendation    Diet: ADULT DIET; Regular; 5 carb choices (75 gm/meal)    Labs: Per primary care physician    Condition at discharge: Stable    Dispo: [facility] supplemental oxygen via nasal canula @ 4 LPM round-the-clock. Continue BIPAP during sleep     Patient ID:  Sindhu Patel  [id number]  79 y.o.  1940    Admit date: 2/24/2023    Discharge date and time:  3/6/2023  9:54 AM    Admission Diagnoses: Principal Problem:    Acute on chronic respiratory failure with hypoxia and hypercapnia (HCC)  Active Problems:    Elevated troponin    Iron deficiency anemia    KIMBERLY (acute kidney injury) ([facility] [address]    Primary hypertension    Pemphigus foliaceous    Acute respiratory failure with hypoxia and hypercapnia (HCC)    Closed fracture of right ankle    Community acquired pneumonia of right upper lobe of lung    Type 2 diabetes mellitus with hyperglycemia, without long-term current use of insulin (HCC)    Hyperkalemia  Resolved Problems:    * No resolved hospital problems.  *      Discharge Diagnoses: Principal Problem:    Acute on chronic respiratory failure with hypoxia and hypercapnia (HCC)  Active Problems:    Elevated troponin    Iron deficiency anemia    KIMBERLY (acute kidney injury) ([facility] [address]    Primary hypertension    Pemphigus foliaceous    Acute respiratory failure with hypoxia and hypercapnia (HCC)    Closed fracture of right ankle    Community acquired pneumonia of right upper lobe of lung    Type 2 diabetes mellitus with hyperglycemia, without long-term current use of insulin (HCC)    Hyperkalemia  Resolved Problems:    * No resolved hospital problems. *      Consults:  IP CONSULT TO PULMONOLOGY  IP CONSULT TO ORTHOPEDIC SURGERY  IP CONSULT TO CRITICAL CARE    Procedures: Open reduction internal fixation of right ankle on 3/2/2023    Hospital Course: This is an 79-year-old female with a history significant for uterine cancer status post hysterectomy, hypertension, hyperlipidemia, and obesity that presented to the hospital after fall at home. She was letting her dog out, lost her balance, and experienced mechanical fall resulting in fracture and dislocation of the right ankle. In the ED, patient was found to have hypercapnic and hypoxic respiratory failure requiring BiPAP. Endotracheal intubation was avoided with continuous BiPAP. Pulmonary was consulted and suspect patient has obesity hypoventilation syndrome and obstructive sleep apnea. Patient will supplemental nasal cannula oxygen during the day and BiPAP every night and with naps on discharge. She will need outpatient sleep study as soon as possible. Once her respiratory status was stabilized, she had above-mentioned surgery without complication. She will need to continue subcutaneous enoxaparin for DVT prophylaxis, and follow-up with orthopedic surgery. When patient ambulatory, can discontinue DVT prophylaxis. Also to note, patient was hypotensive on presentation and was started on stress dose steroids as she does have a history of long-term use of topical steroids. Blood pressure improved, hydrocortisone was weaned, and she remained hemodynamically stable after this was discontinued. Also to note, patient was found to have significant hyperglycemia with steroids, more so than would be expected in a nondiabetic patient. Hemoglobin A1c was found to be 6.7. This finding along with a random blood glucose greater than 300 is consistent with type 2 diabetes mellitus.   In the hospital, she was treated with corrective scale insulin given superimposed steroid-induced hyperglycemia. After discontinuation of steroids, blood glucose significantly improved. Recommend starting metformin on discharge and close outpatient primary care follow-up. Discharge Exam:  Vitals:    03/06/23 0036 03/06/23 0402 03/06/23 0435 03/06/23 0751   BP:  (!) 114/59  122/71   Pulse:  82  81   Resp:  20 20 22   Temp:  98.8 °F (37.1 °C)  98.4 °F (36.9 °C)   TempSrc:  Oral  Axillary   SpO2:  98%  98%   Weight: 290 lb 6.4 oz (131.7 kg)      Height:         General Appearance: alert and oriented to person, place and time, in good spirits this morning   Skin: multiple scabs off arms and legs, gradually resolving  Head: normocephalic and atraumatic  Eyes: pupils equal, round, and reactive to light, extraocular eye movements intact, conjunctivae normal  Neck: neck supple and non tender without mass   Pulmonary/Chest: Nonlabored on 4 L nasal cannula. Diminished but otherwise clear to auscultation bilaterally without crackles, rhonchi, or wheezes  Cardiovascular: normal rate, normal S1 and S2 and no carotid bruits  Abdomen: Obese, soft, obese, non-tender, non-distended, normal bowel sounds, no masses or organomegaly  Extremities: Right lower extremity in postoperative soft splint. Mild left lower extremity edema. No left calf tenderness. No cyanosis or clubbing. Brisk capillary refill to toes bilaterally  Neurologic: no cranial nerve deficit and speech normal    I/O last 3 completed shifts: In: 300 [P.O.:300]  Out: 750 [Urine:750]  No intake/output data recorded. LABS:  Recent Labs     03/04/23 2014 03/05/23  1330 03/06/23  0620    132 136   K 4.8 4.3 4.3   CL 95* 93* 95*   CO2 41* 38* 40*   BUN 16 14 14   CREATININE 0.5 0.5 0.4*   GLUCOSE 96 142* 108*   CALCIUM 8.5* 8.1* 8.4*         Imaging:  XR HIP RIGHT (2-3 VIEWS)    Result Date: 2/24/2023  EXAMINATION: TWO XRAY VIEWS OF THE RIGHT HIP 2/24/2023 9:13 am COMPARISON: None.  HISTORY: ORDERING SYSTEM PROVIDED HISTORY: Pain of right hip joint TECHNOLOGIST PROVIDED HISTORY: Reason for exam:->Pain of right hip joint FINDINGS: The examination is technically limited. There is no acute displaced fractures in the right hip. Degenerative changes are identified in the right hip     No acute displaced fractures     XR KNEE RIGHT (1-2 VIEWS)    Result Date: 2/24/2023  EXAMINATION: TWO XRAY VIEWS OF THE RIGHT KNEE; 2 XRAY VIEWS OF THE RIGHT TIBIA AND FIBULA; THREE XRAY VIEWS OF THE RIGHT ANKLE 2/24/2023 7:33 am COMPARISON: Right ankle 24 February 2023 0559 hours. HISTORY: ORDERING SYSTEM PROVIDED HISTORY: Pain TECHNOLOGIST PROVIDED HISTORY: Reason for exam:->Pain; ORDERING SYSTEM PROVIDED HISTORY: deformity to TLE TECHNOLOGIST PROVIDED HISTORY: Reason for exam:->deformity to TLE FINDINGS: There is a nondisplaced fracture at the fibular neck and a questionable fracture at the lateral tibial plateau. Osteoarthritis at the right knee is moderate to severe at the lateral weight-bearing and patellofemoral compartments, milder at the medial weight-bearing compartment. There is an ankle fracture dislocation with fractures at the posterior and medial malleoli and posterior displacement of the talus relative to the plafond and. There is also vertex anterior angulation at the ankle. Alignment of the ankle is restored to anatomic post reduction. Right fibular neck fracture and questionable lateral tibial plateau fracture for which noncontrast CT could be considered. Fracture dislocation at the ankle with restoration of ankle and Lucien E post reduction. Fractures at the posterior and medial malleoli. XR TIBIA FIBULA RIGHT (2 VIEWS)    Result Date: 2/24/2023  EXAMINATION: TWO XRAY VIEWS OF THE RIGHT KNEE; 2 XRAY VIEWS OF THE RIGHT TIBIA AND FIBULA; THREE XRAY VIEWS OF THE RIGHT ANKLE 2/24/2023 7:33 am COMPARISON: Right ankle 24 February 2023 0559 hours.  HISTORY: ORDERING SYSTEM PROVIDED HISTORY: Pain TECHNOLOGIST PROVIDED HISTORY: Reason for exam:->Pain; ORDERING SYSTEM PROVIDED HISTORY: deformity to TLE TECHNOLOGIST PROVIDED HISTORY: Reason for exam:->deformity to TLE FINDINGS: There is a nondisplaced fracture at the fibular neck and a questionable fracture at the lateral tibial plateau. Osteoarthritis at the right knee is moderate to severe at the lateral weight-bearing and patellofemoral compartments, milder at the medial weight-bearing compartment. There is an ankle fracture dislocation with fractures at the posterior and medial malleoli and posterior displacement of the talus relative to the plafond and. There is also vertex anterior angulation at the ankle. Alignment of the ankle is restored to anatomic post reduction. Right fibular neck fracture and questionable lateral tibial plateau fracture for which noncontrast CT could be considered. Fracture dislocation at the ankle with restoration of ankle and Lucien E post reduction. Fractures at the posterior and medial malleoli. XR ANKLE RIGHT (MIN 3 VIEWS)    Result Date: 2/24/2023  EXAMINATION: TWO XRAY VIEWS OF THE RIGHT KNEE; 2 XRAY VIEWS OF THE RIGHT TIBIA AND FIBULA; THREE XRAY VIEWS OF THE RIGHT ANKLE 2/24/2023 7:33 am COMPARISON: Right ankle 24 February 2023 0559 hours. HISTORY: ORDERING SYSTEM PROVIDED HISTORY: Pain TECHNOLOGIST PROVIDED HISTORY: Reason for exam:->Pain; ORDERING SYSTEM PROVIDED HISTORY: deformity to TLE TECHNOLOGIST PROVIDED HISTORY: Reason for exam:->deformity to TLE FINDINGS: There is a nondisplaced fracture at the fibular neck and a questionable fracture at the lateral tibial plateau. Osteoarthritis at the right knee is moderate to severe at the lateral weight-bearing and patellofemoral compartments, milder at the medial weight-bearing compartment. There is an ankle fracture dislocation with fractures at the posterior and medial malleoli and posterior displacement of the talus relative to the plafond and.  There is also vertex anterior angulation at the ankle. Alignment of the ankle is restored to anatomic post reduction. Right fibular neck fracture and questionable lateral tibial plateau fracture for which noncontrast CT could be considered. Fracture dislocation at the ankle with restoration of ankle and Lucien E post reduction. Fractures at the posterior and medial malleoli. XR ANKLE RIGHT (MIN 3 VIEWS)    Result Date: 2/24/2023  EXAMINATION: THREE XRAY VIEWS OF THE RIGHT ANKLE 2/24/2023 5:06 am COMPARISON: None. HISTORY: ORDERING SYSTEM PROVIDED HISTORY: fall TECHNOLOGIST PROVIDED HISTORY: Reason for exam:->fall FINDINGS: Transverse fracture at the medial malleolus is mildly displaced and there is soft tissue swelling medially and anteriorly. There is a questionable fracture at the posterior malleolus. Consider a lateral view which is slightly rotated internally to eliminate superimposition of the lateral malleolus over the posterior malleolus. The ankle joint space is widened anteriorly. There are small plantar and Achilles heel spurs. Medial malleolar fracture with subluxation at the ankle manifest as anterior widening of the ankle joint. Questionable fracture at the posterior malleolus. See recommendations above. Heel spurs. CT ANKLE RIGHT WO CONTRAST    Result Date: 2/25/2023  EXAMINATION: CT OF THE RIGHT ANKLE WITHOUT CONTRAST 2/25/2023 9:09 am TECHNIQUE: CT of the right ankle was performed without the administration of intravenous contrast.  Multiplanar reformatted images are provided for review. Automated exposure control, iterative reconstruction, and/or weight based adjustment of the mA/kV was utilized to reduce the radiation dose to as low as reasonably achievable.  COMPARISON: Right ankle radiographs 02/24/2023 HISTORY ORDERING SYSTEM PROVIDED HISTORY: fracture TECHNOLOGIST PROVIDED HISTORY: Reason for exam:->fracture FINDINGS: Bones: A medial malleolus transverse mildly displaced fracture is identified. A posterior malleolus slightly comminuted vertical fracture is seen with mild displacement. No intra-articular fracture fragment is identified. There is mild anterior subluxation of the tibiotalar joint. Soft Tissue: Small joint effusion is seen. Mild soft tissue swelling is seen in the area of fractures. Joint: Mild generalized degenerative changes noted throughout the ankle and visualized midfoot. Medial and posterior malleoli mildly displaced fractures with anterior subluxation of the tibiotalar joint. XR CHEST 1 VIEW    Result Date: 2/24/2023  EXAMINATION: ONE XRAY VIEW OF THE CHEST 2/24/2023 5:06 am COMPARISON: 14 February 2023 HISTORY: ORDERING SYSTEM PROVIDED HISTORY: sob TECHNOLOGIST PROVIDED HISTORY: Reason for exam:->sob FINDINGS: Distortion from rightward rotation is evident. Unchanged cardiomediastinal silhouette with normal pulmonary vascularity. Lungs are clear. Neither costophrenic angle is blunted. No active process allowing for rightward rotation. Patient Instructions:   Current Discharge Medication List        START taking these medications    Details   metFORMIN (GLUCOPHAGE) 500 MG tablet Take 1 tablet by mouth 2 times daily (with meals)  Qty: 60 tablet, Refills: 0      atorvastatin (LIPITOR) 10 MG tablet Take 1 tablet by mouth daily  Qty: 30 tablet, Refills: 0      miconazole (MICOTIN) 2 % powder Apply topically 2 times daily. Qty: 45 g, Refills: 1      melatonin 5 MG TBDP disintegrating tablet Take 1 tablet by mouth nightly      pantoprazole (PROTONIX) 40 MG tablet Take 1 tablet by mouth every morning (before breakfast)  Qty: 30 tablet, Refills: 0      fluticasone-salmeterol (ADVAIR DISKUS) 250-50 MCG/ACT AEPB diskus inhaler Inhale 1 puff into the lungs in the morning and 1 puff in the evening.   Qty: 60 each, Refills: 3      albuterol sulfate HFA (VENTOLIN HFA) 108 (90 Base) MCG/ACT inhaler Inhale 2 puffs into the lungs 4 times daily as needed for Wheezing  Qty: 18 g, Refills: 0      enoxaparin Sodium (LOVENOX) 30 MG/0.3ML injection Inject 0.3 mLs into the skin 2 times daily for 21 days  Qty: 60 each, Refills: 0      HYDROcodone-acetaminophen (NORCO) 5-325 MG per tablet Take 1 tablet by mouth every 6 hours as needed for Pain for up to 7 days. Intended supply: 3 days. Take lowest dose possible to manage pain Max Daily Amount: 4 tablets  Qty: 12 tablet, Refills: 0    Comments: Reduce doses taken as pain becomes manageable  Associated Diagnoses: Acute post-operative pain      aspirin EC 81 MG EC tablet Take 1 tablet by mouth in the morning and at bedtime  Qty: 30 tablet, Refills: 0           CONTINUE these medications which have NOT CHANGED    Details   triamcinolone (KENALOG) 0.1 % cream Apply 1 each topically 2 times daily Apply topically 2 times daily.       latanoprost (XALATAN) 0.005 % ophthalmic solution Place 1 drop into both eyes nightly           STOP taking these medications       PREDNISONE PO Comments:   Reason for Stopping:         furosemide (LASIX) 20 MG tablet Comments:   Reason for Stopping:         LORazepam (ATIVAN) 1 MG tablet Comments:   Reason for Stopping:         vitamin D (CHOLECALCIFEROL) 1000 UNIT TABS tablet Comments:   Reason for Stopping:         Meloxicam (MOBIC PO) Comments:   Reason for Stopping:         simvastatin (ZOCOR) 10 MG tablet Comments:   Reason for Stopping:         torsemide (DEMADEX) 10 MG tablet Comments:   Reason for Stopping:         potassium bicarbonate (K-LYTE) 25 MEQ disintegrating tablet Comments:   Reason for Stopping:         Fish Oil-Cholecalciferol (FISH OIL + D3 PO) Comments:   Reason for Stopping:         HYDROcodone-acetaminophen (NORCO) 7.5-325 MG per tablet Comments:   Reason for Stopping:         pilocarpine (SALAGEN) 5 MG tablet Comments:   Reason for Stopping:         cyclobenzaprine (FLEXERIL) 10 MG tablet Comments:   Reason for Stopping:         fluconazole (DIFLUCAN) 150 MG tablet Comments: Reason for Stopping:                 Note  that  37  minutes were spent in preparing discharge papers, discussing discharge with patient, medication review, discussing with case management, etc.    NOTE: This report was transcribed using voice recognition software. Every effort was made to ensure accuracy; however, inadvertent computerized transcription errors may be present.      Signed:  Electronically signed by Lila Mckeon DO on 3/6/2023 at 9:54 AM

## 2023-03-06 NOTE — PLAN OF CARE
Problem: Chronic Conditions and Co-morbidities  Goal: Patient's chronic conditions and co-morbidity symptoms are monitored and maintained or improved  Outcome: Completed     Problem: Pain  Goal: Verbalizes/displays adequate comfort level or baseline comfort level  Outcome: Completed  Flowsheets (Taken 3/6/2023 0402 by Adebayo Young RN)  Verbalizes/displays adequate comfort level or baseline comfort level: Administer analgesics based on type and severity of pain and evaluate response     Problem: Skin/Tissue Integrity - Adult  Goal: Incisions, wounds, or drain sites healing without S/S of infection  Outcome: Completed  Flowsheets  Taken 3/6/2023 0845 by Oziel Chong RN  Incisions, Wounds, or Drain Sites Healing Without Sign and Symptoms of Infection: TWICE DAILY: Assess and document skin integrity  Taken 3/6/2023 0310 by Adebayo Young RN  Incisions, Wounds, or Drain Sites Healing Without Sign and Symptoms of Infection: TWICE DAILY: Assess and document skin integrity     Problem: Musculoskeletal - Adult  Goal: Return mobility to safest level of function  Outcome: Completed  Goal: Maintain proper alignment of affected body part  Outcome: Completed  Goal: Return ADL status to a safe level of function  Outcome: Completed

## 2023-03-06 NOTE — CARE COORDINATION
3/6/23 1023 CM note: COVID (-) 23. 4L nc vs bipap FIO2 70%. Per [facility] liaison, they have obtained auth for pt to come to their facility. Discharge order noted. JOSE ALBERTO signed. HENS completed. Ordered for covid test obtained. Arranged ambulance transport with [facility] for  between 11:30-12:00. Ambulance form on soft chart. Notified pt, pts daughter Radha Presley, pts RN Adarsh Staples, charge nurse [place], and [facility] of  time. Notified [facility] of need for both O2 and bipap- settin/6, rate 18, FIO2 70%, mask size medium. Dr Kelly Carreon asking if sleep study can be done from SNF facility- awaiting [facility]'s response. Electronically signed by Vaishali Beatty RN on 3/6/2023 at 10:37 AM    3/6/23 1411 Per [facility] liaison, they would have to set up sleep study after pt discharges from SNF. Updated Dr Kelly Carreon and he would like me to work on trying to get a trilogy approved for pt for when she's ready to dc from SNF. Referral given to [facility] liaison, and script was completed and faxed back to [facility]. They will work on getting approval from pts insurance and will follow pt at SNF for when she's ready to dc home from there. Updated Lissa Prince, on above.  Electronically signed by Vaishali Beatty RN on 3/6/2023 at 2:17 PM

## 2023-03-26 PROBLEM — R77.8 ELEVATED TROPONIN: Status: RESOLVED | Noted: 2023-02-24 | Resolved: 2023-03-26

## 2023-03-26 PROBLEM — R79.89 ELEVATED TROPONIN: Status: RESOLVED | Noted: 2023-02-24 | Resolved: 2023-03-26

## 2023-05-18 ENCOUNTER — HOSPITAL ENCOUNTER (OUTPATIENT)
Dept: WOUND CARE | Age: 83
Discharge: HOME | End: 2023-05-18
Payer: MEDICARE

## 2023-05-18 VITALS
WEIGHT: 280 LBS | RESPIRATION RATE: 18 BRPM | HEART RATE: 105 BPM | SYSTOLIC BLOOD PRESSURE: 152 MMHG | DIASTOLIC BLOOD PRESSURE: 73 MMHG | BODY MASS INDEX: 41.47 KG/M2 | HEIGHT: 69 IN | TEMPERATURE: 96.4 F

## 2023-05-18 PROCEDURE — 99213 OFFICE O/P EST LOW 20 MIN: CPT

## 2023-05-18 ASSESSMENT — PAIN DESCRIPTION - ORIENTATION: ORIENTATION: RIGHT;LEFT

## 2023-05-18 ASSESSMENT — PAIN - FUNCTIONAL ASSESSMENT: PAIN_FUNCTIONAL_ASSESSMENT: PREVENTS OR INTERFERES SOME ACTIVE ACTIVITIES AND ADLS

## 2023-05-18 ASSESSMENT — PAIN DESCRIPTION - ONSET: ONSET: ON-GOING

## 2023-05-18 ASSESSMENT — PAIN DESCRIPTION - PAIN TYPE: TYPE: CHRONIC PAIN

## 2023-05-18 ASSESSMENT — PAIN DESCRIPTION - DESCRIPTORS: DESCRIPTORS: BURNING

## 2023-05-18 ASSESSMENT — PAIN DESCRIPTION - FREQUENCY: FREQUENCY: CONTINUOUS

## 2023-05-18 ASSESSMENT — PAIN DESCRIPTION - LOCATION: LOCATION: BUTTOCKS

## 2023-05-18 ASSESSMENT — PAIN SCALES - GENERAL: PAINLEVEL_OUTOF10: 8

## 2023-05-18 NOTE — PLAN OF CARE
Problem: Cognitive:  Goal: Knowledge of wound care  Description: Knowledge of wound care  Outcome: Progressing  Goal: Understands risk factors for wounds  Description: Understands risk factors for wounds  Outcome: Progressing     Problem: Wound:  Goal: Will show signs of wound healing; wound closure and no evidence of infection  Description: Will show signs of wound healing; wound closure and no evidence of infection  Outcome: Progressing     Problem: Pressure Ulcer:  Goal: Signs of wound healing will improve  Description: Signs of wound healing will improve  Outcome: Progressing  Goal: Absence of new pressure ulcer  Description: Absence of new pressure ulcer  Outcome: Progressing  Goal: Will show no infection signs and symptoms  Description: Will show no infection signs and symptoms  Outcome: Progressing

## 2023-05-18 NOTE — DISCHARGE INSTRUCTIONS
Visit Discharge/Physician Orders    Discharge condition: Stable    Assessment of pain at discharge:    Anesthetic used: 4% TOPICAL LIDOCAINE    Discharge to: Home    Left via:Private automobile    Accompanied by: accompanied by FAMILY    ECF/HHA:     Dressing Orders: CLEAN WOUND WITH NORMAL SALINE, APPLY XEROFORM AND ABD TO BILATERAL BUTTOCKS. NO TAPE. CHANGE DAILY    Treatment Orders: KEEP WOUND CLEAN AND DRY,  KEEP PRESSURE OFF WOUNDS    [facility] followup visit ______________1 WEEK_______________  (Please note your next appointment above and if you are unable to keep, kindly give a 24 hour notice. Thank you.)    Physician signature:__________________________      If you experience any of the following, please call the [facility] during business hours:    * Increase in Pain  * Temperature over 101  * Increase in drainage from your wound  * Drainage with a foul odor  * Bleeding  * Increase in swelling  * Need for compression bandage changes due to slippage, breakthrough drainage. If you need medical attention outside of the business hours of the [facility] please contact your PCP or go to the nearest emergency room.

## 2023-05-25 ENCOUNTER — HOSPITAL ENCOUNTER (OUTPATIENT)
Dept: WOUND CARE | Age: 83
Discharge: HOME | End: 2023-05-25
Payer: MEDICARE

## 2023-05-25 VITALS
TEMPERATURE: 97.2 F | DIASTOLIC BLOOD PRESSURE: 62 MMHG | SYSTOLIC BLOOD PRESSURE: 114 MMHG | HEART RATE: 106 BPM | RESPIRATION RATE: 18 BRPM

## 2023-05-25 PROCEDURE — 11045 DBRDMT SUBQ TISS EACH ADDL: CPT

## 2023-05-25 PROCEDURE — 11042 DBRDMT SUBQ TIS 1ST 20SQCM/<: CPT

## 2023-05-25 ASSESSMENT — PAIN DESCRIPTION - ORIENTATION: ORIENTATION: LEFT;RIGHT

## 2023-05-25 ASSESSMENT — PAIN SCALES - GENERAL: PAINLEVEL_OUTOF10: 8

## 2023-05-25 ASSESSMENT — PAIN DESCRIPTION - LOCATION: LOCATION: BUTTOCKS

## 2023-05-25 ASSESSMENT — PAIN DESCRIPTION - DESCRIPTORS: DESCRIPTORS: BURNING

## 2023-05-25 NOTE — PLAN OF CARE
Problem: Chronic Conditions and Co-morbidities  Goal: Patient's chronic conditions and co-morbidity symptoms are monitored and maintained or improved  Outcome: Progressing     Problem: Pain  Goal: Verbalizes/displays adequate comfort level or baseline comfort level  Outcome: Progressing     Problem: Cognitive:  Goal: Knowledge of wound care  Description: Knowledge of wound care  Outcome: Progressing  Goal: Understands risk factors for wounds  Description: Understands risk factors for wounds  Outcome: Progressing     Problem: Wound:  Goal: Will show signs of wound healing; wound closure and no evidence of infection  Description: Will show signs of wound healing; wound closure and no evidence of infection  Outcome: Progressing     Problem: Pressure Ulcer:  Goal: Signs of wound healing will improve  Description: Signs of wound healing will improve  Outcome: Progressing  Goal: Absence of new pressure ulcer  Description: Absence of new pressure ulcer  Outcome: Progressing  Goal: Will show no infection signs and symptoms  Description: Will show no infection signs and symptoms  Outcome: Progressing

## 2023-05-25 NOTE — DISCHARGE INSTRUCTIONS
Visit Discharge/Physician Orders     Discharge condition: Stable     Assessment of pain at discharge:     Anesthetic used: 4% TOPICAL LIDOCAINE     Discharge to: Home     Left via:Private automobile     Accompanied by: accompanied by FAMILY     ECF/HHA: [address]      Dressing Orders: CLEAN WOUND WITH NORMAL SALINE, APPLY XEROFORM AND ABD TO BILATERAL BUTTOCKS. NO TAPE. CHANGE DAILY     Treatment Orders: KEEP WOUND CLEAN AND DRY,  KEEP PRESSURE OFF WOUNDS     [address] [facility] followup visit ______________1 WEEK_______________  (Please note your next appointment above and if you are unable to keep, kindly give a 24 hour notice. Thank you.)     Physician signature:__________________________        If you experience any of the following, please call the [facility] during business hours:     * Increase in Pain  * Temperature over 101  * Increase in drainage from your wound  * Drainage with a foul odor  * Bleeding  * Increase in swelling  * Need for compression bandage changes due to slippage, breakthrough drainage. If you need medical attention outside of the business hours of the [facility] please contact your PCP or go to the nearest emergency room.

## 2023-05-31 NOTE — DISCHARGE INSTRUCTIONS
Visit Discharge/Physician Orders     Discharge condition: Stable     Assessment of pain at discharge:     Anesthetic used: 4% TOPICAL LIDOCAINE     Discharge to: Home     Left via:Private automobile     Accompanied by: accompanied by FAMILY     ECF/[facility]: [address]      Dressing Orders: CLEAN WOUND WITH NORMAL SALINE, APPLY XEROFORM AND ABD TO BILATERAL BUTTOCKS. NO TAPE. CHANGE DAILY     Treatment Orders: KEEP WOUND CLEAN AND DRY,  KEEP PRESSURE OFF WOUNDS     [address] [facility] followup visit ______________1 WEEK_______________  (Please note your next appointment above and if you are unable to keep, kindly give a 24 hour notice. Thank you.)     Physician signature:__________________________        If you experience any of the following, please call the [facility] during business hours:     * Increase in Pain  * Temperature over 101  * Increase in drainage from your wound  * Drainage with a foul odor  * Bleeding  * Increase in swelling  * Need for compression bandage changes due to slippage, breakthrough drainage. If you need medical attention outside of the business hours of the [facility] please contact your PCP or go to the nearest emergency room.

## 2023-06-01 ENCOUNTER — HOSPITAL ENCOUNTER (OUTPATIENT)
Dept: WOUND CARE | Age: 83
Discharge: HOME | End: 2023-06-01

## 2023-06-07 NOTE — DISCHARGE INSTRUCTIONS
Visit Discharge/Physician Orders     Discharge condition: Stable     Assessment of pain at discharge:     Anesthetic used: 4% TOPICAL LIDOCAINE     Discharge to: Home     Left via:Private automobile     Accompanied by: accompanied by FAMILY     ECF/[facility]: [address]      Dressing Orders: CLEAN WOUND WITH NORMAL SALINE, APPLY XEROFORM AND ABD TO BILATERAL BUTTOCKS. NO TAPE. CHANGE DAILY     Treatment Orders: KEEP WOUND CLEAN AND DRY,  KEEP PRESSURE OFF WOUNDS     [facility] followup visit ______________2 WEEK_______________  (Please note your next appointment above and if you are unable to keep, kindly give a 24 hour notice. Thank you.)     Physician signature:__________________________        If you experience any of the following, please call the [facility] during business hours:     * Increase in Pain  * Temperature over 101  * Increase in drainage from your wound  * Drainage with a foul odor  * Bleeding  * Increase in swelling  * Need for compression bandage changes due to slippage, breakthrough drainage. If you need medical attention outside of the business hours of the [facility] please contact your PCP or go to the nearest emergency room.

## 2023-06-08 ENCOUNTER — HOSPITAL ENCOUNTER (OUTPATIENT)
Dept: WOUND CARE | Age: 83
Discharge: HOME | End: 2023-06-08
Payer: MEDICARE

## 2023-06-08 VITALS
TEMPERATURE: 96.6 F | RESPIRATION RATE: 20 BRPM | HEART RATE: 88 BPM | DIASTOLIC BLOOD PRESSURE: 52 MMHG | SYSTOLIC BLOOD PRESSURE: 115 MMHG

## 2023-06-08 PROBLEM — L89.43: Status: ACTIVE | Noted: 2023-06-08

## 2023-06-08 PROBLEM — R23.8 SKIN IRRITATION: Status: ACTIVE | Noted: 2023-06-08

## 2023-06-08 PROCEDURE — 11042 DBRDMT SUBQ TIS 1ST 20SQCM/<: CPT

## 2023-06-08 PROCEDURE — 11045 DBRDMT SUBQ TISS EACH ADDL: CPT

## 2023-06-08 ASSESSMENT — PAIN SCALES - GENERAL: PAINLEVEL_OUTOF10: 8

## 2023-06-08 ASSESSMENT — PAIN DESCRIPTION - ORIENTATION: ORIENTATION: RIGHT;LEFT

## 2023-06-08 ASSESSMENT — PAIN DESCRIPTION - DESCRIPTORS: DESCRIPTORS: BURNING

## 2023-06-08 ASSESSMENT — PAIN DESCRIPTION - ONSET: ONSET: ON-GOING

## 2023-06-08 ASSESSMENT — PAIN DESCRIPTION - LOCATION: LOCATION: BUTTOCKS

## 2023-06-08 ASSESSMENT — PAIN DESCRIPTION - FREQUENCY: FREQUENCY: CONTINUOUS

## 2023-06-21 ENCOUNTER — APPOINTMENT (OUTPATIENT)
Dept: ULTRASOUND IMAGING | Age: 83
End: 2023-06-21
Payer: MEDICARE

## 2023-06-21 ENCOUNTER — APPOINTMENT (OUTPATIENT)
Dept: GENERAL RADIOLOGY | Age: 83
End: 2023-06-21
Payer: MEDICARE

## 2023-06-21 ENCOUNTER — HOSPITAL ENCOUNTER (INPATIENT)
Age: 83
LOS: 8 days | Discharge: NURSING, REHAB OR LONG-TERM CARE | End: 2023-06-29
Attending: STUDENT IN AN ORGANIZED HEALTH CARE EDUCATION/TRAINING PROGRAM | Admitting: FAMILY MEDICINE
Payer: MEDICARE

## 2023-06-21 ENCOUNTER — APPOINTMENT (OUTPATIENT)
Dept: CT IMAGING | Age: 83
End: 2023-06-21
Payer: MEDICARE

## 2023-06-21 DIAGNOSIS — J96.01 ACUTE RESPIRATORY FAILURE WITH HYPOXIA AND HYPERCAPNIA (HCC): ICD-10-CM

## 2023-06-21 DIAGNOSIS — A41.9 SEPTIC SHOCK (HCC): Primary | ICD-10-CM

## 2023-06-21 DIAGNOSIS — I26.94 MULTIPLE SUBSEGMENTAL PULMONARY EMBOLI WITHOUT ACUTE COR PULMONALE (HCC): ICD-10-CM

## 2023-06-21 DIAGNOSIS — I82.4Y9 DEEP VEIN THROMBOSIS (DVT) OF PROXIMAL LOWER EXTREMITY, UNSPECIFIED CHRONICITY, UNSPECIFIED LATERALITY (HCC): ICD-10-CM

## 2023-06-21 DIAGNOSIS — J18.9 PNEUMONIA DUE TO INFECTIOUS ORGANISM, UNSPECIFIED LATERALITY, UNSPECIFIED PART OF LUNG: ICD-10-CM

## 2023-06-21 DIAGNOSIS — T14.8XXA OPEN WOUND: ICD-10-CM

## 2023-06-21 DIAGNOSIS — R31.9 URINARY TRACT INFECTION WITH HEMATURIA, SITE UNSPECIFIED: ICD-10-CM

## 2023-06-21 DIAGNOSIS — N39.0 URINARY TRACT INFECTION WITH HEMATURIA, SITE UNSPECIFIED: ICD-10-CM

## 2023-06-21 DIAGNOSIS — N17.9 AKI (ACUTE KIDNEY INJURY) (HCC): ICD-10-CM

## 2023-06-21 DIAGNOSIS — R65.21 SEPTIC SHOCK (HCC): Primary | ICD-10-CM

## 2023-06-21 DIAGNOSIS — J96.02 ACUTE RESPIRATORY FAILURE WITH HYPOXIA AND HYPERCAPNIA (HCC): ICD-10-CM

## 2023-06-21 LAB
AADO2: 418 MMHG
ABO + RH BLD: NORMAL
ALBUMIN SERPL-MCNC: 3.4 G/DL (ref 3.5–5.2)
ALP SERPL-CCNC: 100 U/L (ref 35–104)
ALT SERPL-CCNC: 61 U/L (ref 0–32)
ANION GAP SERPL CALCULATED.3IONS-SCNC: 15 MMOL/L (ref 7–16)
ANISOCYTOSIS: ABNORMAL
AST SERPL-CCNC: 92 U/L (ref 0–31)
B.E.: -0.1 MMOL/L (ref -3–3)
B.E.: -0.4 MMOL/L (ref -3–3)
B.E.: 0.2 MMOL/L (ref -3–3)
BASOPHILS # BLD: 0.02 E9/L (ref 0–0.2)
BASOPHILS NFR BLD: 0.1 % (ref 0–2)
BILIRUB SERPL-MCNC: 0.8 MG/DL (ref 0–1.2)
BLD GP AB SCN SERPL QL: NORMAL
BNP BLD-MCNC: ABNORMAL PG/ML (ref 0–450)
BUN SERPL-MCNC: 54 MG/DL (ref 6–23)
CALCIUM SERPL-MCNC: 8.6 MG/DL (ref 8.6–10.2)
CHLORIDE SERPL-SCNC: 93 MMOL/L (ref 98–107)
CO2 SERPL-SCNC: 27 MMOL/L (ref 22–29)
COHB: 0.2 % (ref 0–1.5)
CREAT SERPL-MCNC: 2.2 MG/DL (ref 0.5–1)
CRITICAL: ABNORMAL
DATE ANALYZED: ABNORMAL
DATE OF COLLECTION: ABNORMAL
DELIVERY SYSTEMS: ABNORMAL
DELIVERY SYSTEMS: ABNORMAL
DEVICE: ABNORMAL
DEVICE: ABNORMAL
EOSINOPHIL # BLD: 0 E9/L (ref 0.05–0.5)
EOSINOPHIL NFR BLD: 0 % (ref 0–6)
ERYTHROCYTE [DISTWIDTH] IN BLOOD BY AUTOMATED COUNT: 19.1 FL (ref 11.5–15)
FIO2: 100 %
FIO2: 60
GLUCOSE SERPL-MCNC: 158 MG/DL (ref 74–99)
HCO3: 25.4 MMOL/L (ref 22–26)
HCO3: 26.8 MMOL/L (ref 22–26)
HCO3: 29.3 MMOL/L (ref 22–26)
HCT VFR BLD AUTO: 36.1 % (ref 34–48)
HGB BLD-MCNC: 9.9 G/DL (ref 11.5–15.5)
HHB: 1.4 % (ref 0–5)
HYPOCHROMIA: ABNORMAL
IMM GRANULOCYTES # BLD: 0.07 E9/L
IMM GRANULOCYTES NFR BLD: 0.4 % (ref 0–5)
LAB: ABNORMAL
LACTATE BLDV-SCNC: 1.5 MMOL/L (ref 0.5–1.9)
LACTATE BLDV-SCNC: 6.6 MMOL/L (ref 0.5–1.9)
LIPASE: 22 U/L (ref 13–60)
LYMPHOCYTES # BLD: 2.4 E9/L (ref 1.5–4)
LYMPHOCYTES NFR BLD: 15.2 % (ref 20–42)
Lab: ABNORMAL
MCH RBC QN AUTO: 21.5 PG (ref 26–35)
MCHC RBC AUTO-ENTMCNC: 27.4 % (ref 32–34.5)
MCV RBC AUTO: 78.3 FL (ref 80–99.9)
METHB: 0.4 % (ref 0–1.5)
MODE: ABNORMAL
MODE: AC
MONOCYTES # BLD: 1.73 E9/L (ref 0.1–0.95)
MONOCYTES NFR BLD: 11 % (ref 2–12)
NEUTROPHILS # BLD: 11.55 E9/L (ref 1.8–7.3)
NEUTS SEG NFR BLD: 73.3 % (ref 43–80)
O2 CONTENT: 15.2 ML/DL
O2 SATURATION: 90.6 % (ref 92–98.5)
O2 SATURATION: 98.6 % (ref 92–98.5)
O2 SATURATION: 99.5 % (ref 92–98.5)
O2HB: 98 % (ref 94–97)
OPERATOR ID: 30
OPERATOR ID: 30
OPERATOR ID: 914
OVALOCYTES: ABNORMAL
PATIENT TEMP: 37 C
PCO2 37: 45.2 MMHG (ref 35–45)
PCO2 37: 52.8 MMHG (ref 35–45)
PCO2: 73.8 MMHG (ref 35–45)
PEEP/CPAP: 6 CMH2O
PFO2: 1.96 MMHG/%
PH 37: 7.31 (ref 7.35–7.45)
PH 37: 7.36 (ref 7.35–7.45)
PH BLOOD GAS: 7.22 (ref 7.35–7.45)
PLATELET # BLD AUTO: 271 E9/L (ref 130–450)
PMV BLD AUTO: 10.5 FL (ref 7–12)
PO2 37: 179.4 MMHG (ref 60–80)
PO2 37: 66.4 MMHG (ref 60–80)
PO2: 196.2 MMHG (ref 75–100)
POC SOURCE: ABNORMAL
POC SOURCE: ABNORMAL
POIKILOCYTES: ABNORMAL
POLYCHROMASIA: ABNORMAL
POSITIVE END EXP PRESS: 8 CMH2O
POTASSIUM SERPL-SCNC: 5.3 MMOL/L (ref 3.5–5)
PROT SERPL-MCNC: 6.7 G/DL (ref 6.4–8.3)
PS: 10 CMH20
RBC # BLD AUTO: 4.61 E12/L (ref 3.5–5.5)
RESPIRATORY RATE: 18 B/MIN
RI(T): 2.13
SODIUM SERPL-SCNC: 135 MMOL/L (ref 132–146)
SOURCE, BLOOD GAS: ABNORMAL
TEAR DROP CELLS: ABNORMAL
THB: 10.7 G/DL (ref 11.5–16.5)
TIDAL VOLUME: 450 ML
TIME ANALYZED: 2100
TROPONIN, HIGH SENSITIVITY: 63 NG/L (ref 0–9)
WBC # BLD: 15.8 E9/L (ref 4.5–11.5)

## 2023-06-21 PROCEDURE — 36556 INSERT NON-TUNNEL CV CATH: CPT

## 2023-06-21 PROCEDURE — 2580000003 HC RX 258: Performed by: STUDENT IN AN ORGANIZED HEALTH CARE EDUCATION/TRAINING PROGRAM

## 2023-06-21 PROCEDURE — 96374 THER/PROPH/DIAG INJ IV PUSH: CPT

## 2023-06-21 PROCEDURE — 6360000002 HC RX W HCPCS: Performed by: STUDENT IN AN ORGANIZED HEALTH CARE EDUCATION/TRAINING PROGRAM

## 2023-06-21 PROCEDURE — 96367 TX/PROPH/DG ADDL SEQ IV INF: CPT

## 2023-06-21 PROCEDURE — 2000000000 HC ICU R&B

## 2023-06-21 PROCEDURE — 85025 COMPLETE CBC W/AUTO DIFF WBC: CPT

## 2023-06-21 PROCEDURE — 99285 EMERGENCY DEPT VISIT HI MDM: CPT

## 2023-06-21 PROCEDURE — 82805 BLOOD GASES W/O2 SATURATION: CPT

## 2023-06-21 PROCEDURE — 87040 BLOOD CULTURE FOR BACTERIA: CPT

## 2023-06-21 PROCEDURE — 71275 CT ANGIOGRAPHY CHEST: CPT

## 2023-06-21 PROCEDURE — 0BH17EZ INSERTION OF ENDOTRACHEAL AIRWAY INTO TRACHEA, VIA NATURAL OR ARTIFICIAL OPENING: ICD-10-PCS | Performed by: INTERNAL MEDICINE

## 2023-06-21 PROCEDURE — 86900 BLOOD TYPING SEROLOGIC ABO: CPT

## 2023-06-21 PROCEDURE — 84484 ASSAY OF TROPONIN QUANT: CPT

## 2023-06-21 PROCEDURE — 93970 EXTREMITY STUDY: CPT

## 2023-06-21 PROCEDURE — 36415 COLL VENOUS BLD VENIPUNCTURE: CPT

## 2023-06-21 PROCEDURE — 74177 CT ABD & PELVIS W/CONTRAST: CPT

## 2023-06-21 PROCEDURE — 5A1945Z RESPIRATORY VENTILATION, 24-96 CONSECUTIVE HOURS: ICD-10-PCS | Performed by: INTERNAL MEDICINE

## 2023-06-21 PROCEDURE — 71045 X-RAY EXAM CHEST 1 VIEW: CPT

## 2023-06-21 PROCEDURE — 93005 ELECTROCARDIOGRAM TRACING: CPT | Performed by: STUDENT IN AN ORGANIZED HEALTH CARE EDUCATION/TRAINING PROGRAM

## 2023-06-21 PROCEDURE — 87088 URINE BACTERIA CULTURE: CPT

## 2023-06-21 PROCEDURE — 70450 CT HEAD/BRAIN W/O DYE: CPT

## 2023-06-21 PROCEDURE — 82803 BLOOD GASES ANY COMBINATION: CPT

## 2023-06-21 PROCEDURE — 86850 RBC ANTIBODY SCREEN: CPT

## 2023-06-21 PROCEDURE — 99223 1ST HOSP IP/OBS HIGH 75: CPT | Performed by: FAMILY MEDICINE

## 2023-06-21 PROCEDURE — 2500000003 HC RX 250 WO HCPCS: Performed by: STUDENT IN AN ORGANIZED HEALTH CARE EDUCATION/TRAINING PROGRAM

## 2023-06-21 PROCEDURE — 96366 THER/PROPH/DIAG IV INF ADDON: CPT

## 2023-06-21 PROCEDURE — 96365 THER/PROPH/DIAG IV INF INIT: CPT

## 2023-06-21 PROCEDURE — 87150 DNA/RNA AMPLIFIED PROBE: CPT

## 2023-06-21 PROCEDURE — 83605 ASSAY OF LACTIC ACID: CPT

## 2023-06-21 PROCEDURE — 96375 TX/PRO/DX INJ NEW DRUG ADDON: CPT

## 2023-06-21 PROCEDURE — 0202U NFCT DS 22 TRGT SARS-COV-2: CPT

## 2023-06-21 PROCEDURE — 94002 VENT MGMT INPAT INIT DAY: CPT

## 2023-06-21 PROCEDURE — 94660 CPAP INITIATION&MGMT: CPT

## 2023-06-21 PROCEDURE — 81001 URINALYSIS AUTO W/SCOPE: CPT

## 2023-06-21 PROCEDURE — 02HV33Z INSERTION OF INFUSION DEVICE INTO SUPERIOR VENA CAVA, PERCUTANEOUS APPROACH: ICD-10-PCS | Performed by: INTERNAL MEDICINE

## 2023-06-21 PROCEDURE — 2580000003 HC RX 258

## 2023-06-21 PROCEDURE — 51702 INSERT TEMP BLADDER CATH: CPT

## 2023-06-21 PROCEDURE — 5A09457 ASSISTANCE WITH RESPIRATORY VENTILATION, 24-96 CONSECUTIVE HOURS, CONTINUOUS POSITIVE AIRWAY PRESSURE: ICD-10-PCS | Performed by: INTERNAL MEDICINE

## 2023-06-21 PROCEDURE — 6360000002 HC RX W HCPCS

## 2023-06-21 PROCEDURE — 31500 INSERT EMERGENCY AIRWAY: CPT

## 2023-06-21 PROCEDURE — 86901 BLOOD TYPING SEROLOGIC RH(D): CPT

## 2023-06-21 PROCEDURE — 83880 ASSAY OF NATRIURETIC PEPTIDE: CPT

## 2023-06-21 PROCEDURE — 80053 COMPREHEN METABOLIC PANEL: CPT

## 2023-06-21 PROCEDURE — 6360000004 HC RX CONTRAST MEDICATION: Performed by: RADIOLOGY

## 2023-06-21 PROCEDURE — 83690 ASSAY OF LIPASE: CPT

## 2023-06-21 RX ORDER — ACETAMINOPHEN 325 MG/1
650 TABLET ORAL EVERY 6 HOURS PRN
Status: DISCONTINUED | OUTPATIENT
Start: 2023-06-21 | End: 2023-06-30 | Stop reason: HOSPADM

## 2023-06-21 RX ORDER — 0.9 % SODIUM CHLORIDE 0.9 %
1000 INTRAVENOUS SOLUTION INTRAVENOUS ONCE
Status: COMPLETED | OUTPATIENT
Start: 2023-06-21 | End: 2023-06-21

## 2023-06-21 RX ORDER — 0.9 % SODIUM CHLORIDE 0.9 %
500 INTRAVENOUS SOLUTION INTRAVENOUS ONCE
Status: COMPLETED | OUTPATIENT
Start: 2023-06-21 | End: 2023-06-21

## 2023-06-21 RX ORDER — NOREPINEPHRINE BITARTRATE 0.06 MG/ML
1-100 INJECTION, SOLUTION INTRAVENOUS CONTINUOUS
Status: DISCONTINUED | OUTPATIENT
Start: 2023-06-21 | End: 2023-06-26

## 2023-06-21 RX ORDER — FENTANYL CITRATE 50 UG/ML
INJECTION, SOLUTION INTRAMUSCULAR; INTRAVENOUS
Status: COMPLETED
Start: 2023-06-21 | End: 2023-06-21

## 2023-06-21 RX ORDER — ROCURONIUM BROMIDE 10 MG/ML
1 INJECTION, SOLUTION INTRAVENOUS ONCE
Status: COMPLETED | OUTPATIENT
Start: 2023-06-21 | End: 2023-06-21

## 2023-06-21 RX ORDER — HEPARIN SODIUM 10000 [USP'U]/100ML
5-30 INJECTION, SOLUTION INTRAVENOUS CONTINUOUS
Status: DISCONTINUED | OUTPATIENT
Start: 2023-06-21 | End: 2023-06-23

## 2023-06-21 RX ORDER — HEPARIN SODIUM 1000 [USP'U]/ML
10000 INJECTION, SOLUTION INTRAVENOUS; SUBCUTANEOUS ONCE
Status: COMPLETED | OUTPATIENT
Start: 2023-06-21 | End: 2023-06-22

## 2023-06-21 RX ORDER — SODIUM CHLORIDE 9 MG/ML
INJECTION, SOLUTION INTRAVENOUS PRN
Status: DISCONTINUED | OUTPATIENT
Start: 2023-06-21 | End: 2023-06-30 | Stop reason: HOSPADM

## 2023-06-21 RX ORDER — SODIUM CHLORIDE 0.9 % (FLUSH) 0.9 %
5-40 SYRINGE (ML) INJECTION PRN
Status: DISCONTINUED | OUTPATIENT
Start: 2023-06-21 | End: 2023-06-30 | Stop reason: HOSPADM

## 2023-06-21 RX ORDER — ETOMIDATE 2 MG/ML
20 INJECTION INTRAVENOUS ONCE
Status: COMPLETED | OUTPATIENT
Start: 2023-06-21 | End: 2023-06-21

## 2023-06-21 RX ORDER — HEPARIN SODIUM 1000 [USP'U]/ML
10000 INJECTION, SOLUTION INTRAVENOUS; SUBCUTANEOUS PRN
Status: DISCONTINUED | OUTPATIENT
Start: 2023-06-21 | End: 2023-06-23

## 2023-06-21 RX ORDER — ACETAMINOPHEN 650 MG/1
650 SUPPOSITORY RECTAL EVERY 6 HOURS PRN
Status: DISCONTINUED | OUTPATIENT
Start: 2023-06-21 | End: 2023-06-30 | Stop reason: HOSPADM

## 2023-06-21 RX ORDER — SODIUM CHLORIDE 9 MG/ML
INJECTION, SOLUTION INTRAVENOUS CONTINUOUS
Status: DISCONTINUED | OUTPATIENT
Start: 2023-06-22 | End: 2023-06-24

## 2023-06-21 RX ORDER — SODIUM CHLORIDE 9 MG/ML
INJECTION, SOLUTION INTRAVENOUS
Status: COMPLETED
Start: 2023-06-21 | End: 2023-06-21

## 2023-06-21 RX ORDER — SODIUM CHLORIDE 0.9 % (FLUSH) 0.9 %
5-40 SYRINGE (ML) INJECTION EVERY 12 HOURS SCHEDULED
Status: DISCONTINUED | OUTPATIENT
Start: 2023-06-22 | End: 2023-06-30 | Stop reason: HOSPADM

## 2023-06-21 RX ORDER — HEPARIN SODIUM 1000 [USP'U]/ML
5000 INJECTION, SOLUTION INTRAVENOUS; SUBCUTANEOUS PRN
Status: DISCONTINUED | OUTPATIENT
Start: 2023-06-21 | End: 2023-06-23

## 2023-06-21 RX ADMIN — VANCOMYCIN HYDROCHLORIDE 2250 MG: 10 INJECTION, POWDER, LYOPHILIZED, FOR SOLUTION INTRAVENOUS at 20:15

## 2023-06-21 RX ADMIN — IOPAMIDOL 75 ML: 755 INJECTION, SOLUTION INTRAVENOUS at 22:02

## 2023-06-21 RX ADMIN — SODIUM CHLORIDE 500 ML: 9 INJECTION, SOLUTION INTRAVENOUS at 18:57

## 2023-06-21 RX ADMIN — ETOMIDATE 20 MG: 2 INJECTION, SOLUTION INTRAVENOUS at 21:16

## 2023-06-21 RX ADMIN — NOREPINEPHRINE BITARTRATE 15 MCG/MIN: 1 INJECTION, SOLUTION, CONCENTRATE INTRAVENOUS at 20:10

## 2023-06-21 RX ADMIN — PIPERACILLIN AND TAZOBACTAM 4500 MG: 4; .5 INJECTION, POWDER, LYOPHILIZED, FOR SOLUTION INTRAVENOUS at 18:45

## 2023-06-21 RX ADMIN — FENTANYL CITRATE 50 MCG/HR: 0.05 INJECTION, SOLUTION INTRAMUSCULAR; INTRAVENOUS at 21:32

## 2023-06-21 RX ADMIN — SODIUM CHLORIDE 1000 ML: 9 INJECTION, SOLUTION INTRAVENOUS at 17:17

## 2023-06-21 RX ADMIN — ROCURONIUM BROMIDE 127 MG: 10 INJECTION, SOLUTION INTRAVENOUS at 21:25

## 2023-06-21 RX ADMIN — SODIUM CHLORIDE 500 ML: 9 INJECTION, SOLUTION INTRAVENOUS at 19:45

## 2023-06-21 RX ADMIN — FENTANYL CITRATE 100 MCG: 50 INJECTION, SOLUTION INTRAMUSCULAR; INTRAVENOUS at 21:27

## 2023-06-21 RX ADMIN — Medication 500 ML: at 18:57

## 2023-06-21 ASSESSMENT — PULMONARY FUNCTION TESTS: PIF_VALUE: 20

## 2023-06-21 ASSESSMENT — PAIN DESCRIPTION - LOCATION: LOCATION: OTHER (COMMENT)

## 2023-06-21 ASSESSMENT — PAIN - FUNCTIONAL ASSESSMENT: PAIN_FUNCTIONAL_ASSESSMENT: 0-10

## 2023-06-21 NOTE — DISCHARGE INSTRUCTIONS
Visit Discharge/Physician Orders     Discharge condition: Stable     Assessment of pain at discharge:     Anesthetic used: 4% TOPICAL LIDOCAINE     Discharge to: Home     Left via:Private automobile     Accompanied by: accompanied by FAMILY     ECF/[facility]HA: [address]      Dressing Orders: CLEAN WOUND WITH NORMAL SALINE, APPLY XEROFORM AND ABD TO BILATERAL BUTTOCKS. NO TAPE. CHANGE DAILY     Treatment Orders: KEEP WOUND CLEAN AND DRY,  KEEP PRESSURE OFF WOUNDS     [address] [facility] followup visit ______________2 WEEK_______________  (Please note your next appointment above and if you are unable to keep, kindly give a 24 hour notice. Thank you.)     Physician signature:__________________________        If you experience any of the following, please call the [facility] during business hours:     * Increase in Pain  * Temperature over 101  * Increase in drainage from your wound  * Drainage with a foul odor  * Bleeding  * Increase in swelling  * Need for compression bandage changes due to slippage, breakthrough drainage. If you need medical attention outside of the business hours of the [facility] please contact your PCP or go to the nearest emergency room.

## 2023-06-22 ENCOUNTER — APPOINTMENT (OUTPATIENT)
Dept: GENERAL RADIOLOGY | Age: 83
End: 2023-06-22
Payer: MEDICARE

## 2023-06-22 ENCOUNTER — HOSPITAL ENCOUNTER (OUTPATIENT)
Dept: WOUND CARE | Age: 83
Discharge: HOME | End: 2023-06-22

## 2023-06-22 PROBLEM — I26.99 BILATERAL PULMONARY EMBOLISM (HCC): Status: ACTIVE | Noted: 2023-06-22

## 2023-06-22 PROBLEM — E78.5 HYPERLIPIDEMIA: Status: ACTIVE | Noted: 2023-06-22

## 2023-06-22 PROBLEM — J96.01 ACUTE RESPIRATORY FAILURE WITH HYPOXIA AND HYPERCAPNIA (HCC): Status: RESOLVED | Noted: 2023-02-27 | Resolved: 2023-06-22

## 2023-06-22 PROBLEM — K72.00 SHOCK LIVER: Status: ACTIVE | Noted: 2023-06-22

## 2023-06-22 PROBLEM — I26.94 MULTIPLE SUBSEGMENTAL PULMONARY EMBOLI WITHOUT ACUTE COR PULMONALE (HCC): Status: ACTIVE | Noted: 2023-06-22

## 2023-06-22 PROBLEM — S82.891A CLOSED FRACTURE OF RIGHT ANKLE: Status: RESOLVED | Noted: 2023-02-27 | Resolved: 2023-06-22

## 2023-06-22 PROBLEM — G47.33 OSA (OBSTRUCTIVE SLEEP APNEA): Status: ACTIVE | Noted: 2023-06-22

## 2023-06-22 PROBLEM — N39.0 URINARY TRACT INFECTION WITH HEMATURIA: Status: ACTIVE | Noted: 2023-06-22

## 2023-06-22 PROBLEM — I82.4Y9 DEEP VEIN THROMBOSIS (DVT) OF PROXIMAL LOWER EXTREMITY (HCC): Status: ACTIVE | Noted: 2023-06-22

## 2023-06-22 PROBLEM — R31.9 URINARY TRACT INFECTION WITH HEMATURIA: Status: ACTIVE | Noted: 2023-06-22

## 2023-06-22 PROBLEM — J96.02 ACUTE RESPIRATORY FAILURE WITH HYPOXIA AND HYPERCAPNIA (HCC): Status: RESOLVED | Noted: 2023-02-27 | Resolved: 2023-06-22

## 2023-06-22 LAB
ALBUMIN SERPL-MCNC: 2.7 G/DL (ref 3.5–5.2)
ALP SERPL-CCNC: 89 U/L (ref 35–104)
ALT SERPL-CCNC: 227 U/L (ref 0–32)
ANION GAP SERPL CALCULATED.3IONS-SCNC: 10 MMOL/L (ref 7–16)
ANION GAP SERPL CALCULATED.3IONS-SCNC: 14 MMOL/L (ref 7–16)
ANISOCYTOSIS: ABNORMAL
APTT BLD: 104.2 SEC (ref 24.5–35.1)
APTT BLD: 60.9 SEC (ref 24.5–35.1)
APTT BLD: 71.8 SEC (ref 24.5–35.1)
APTT BLD: >240 SEC (ref 24.5–35.1)
AST SERPL-CCNC: 346 U/L (ref 0–31)
B PARAP IS1001 DNA NPH QL NAA+NON-PROBE: NOT DETECTED
B PERT.PT PRMT NPH QL NAA+NON-PROBE: NOT DETECTED
B.E.: -0.2 MMOL/L (ref -3–3)
BACTERIA URNS QL MICRO: ABNORMAL /HPF
BASOPHILS # BLD: 0 E9/L (ref 0–0.2)
BASOPHILS NFR BLD: 0 % (ref 0–2)
BILIRUB SERPL-MCNC: 0.8 MG/DL (ref 0–1.2)
BILIRUB UR QL STRIP: ABNORMAL
BUN SERPL-MCNC: 58 MG/DL (ref 6–23)
BUN SERPL-MCNC: 58 MG/DL (ref 6–23)
BURR CELLS: ABNORMAL
C PNEUM DNA NPH QL NAA+NON-PROBE: NOT DETECTED
CALCIUM SERPL-MCNC: 7.8 MG/DL (ref 8.6–10.2)
CALCIUM SERPL-MCNC: 7.8 MG/DL (ref 8.6–10.2)
CHLORIDE SERPL-SCNC: 100 MMOL/L (ref 98–107)
CHLORIDE SERPL-SCNC: 100 MMOL/L (ref 98–107)
CHLORIDE UR-SCNC: <20 MMOL/L
CLARITY UR: ABNORMAL
CO2 SERPL-SCNC: 25 MMOL/L (ref 22–29)
CO2 SERPL-SCNC: 28 MMOL/L (ref 22–29)
COLOR UR: YELLOW
CREAT SERPL-MCNC: 1.7 MG/DL (ref 0.5–1)
CREAT SERPL-MCNC: 1.9 MG/DL (ref 0.5–1)
CREAT UR-MCNC: 124 MG/DL (ref 29–226)
CRYSTALS URNS MICRO: ABNORMAL /HPF
DELIVERY SYSTEMS: ABNORMAL
DEVICE: ABNORMAL
EKG ATRIAL RATE: 85 BPM
EKG P AXIS: 58 DEGREES
EKG P-R INTERVAL: 146 MS
EKG Q-T INTERVAL: 382 MS
EKG QRS DURATION: 96 MS
EKG QTC CALCULATION (BAZETT): 454 MS
EKG R AXIS: 57 DEGREES
EKG T AXIS: 55 DEGREES
EKG VENTRICULAR RATE: 85 BPM
EOSINOPHIL # BLD: 0 E9/L (ref 0.05–0.5)
EOSINOPHIL NFR BLD: 0 % (ref 0–6)
EOSINOPHIL, URINE: 0 % (ref 0–1)
EPI CELLS #/AREA URNS HPF: ABNORMAL /HPF
ERYTHROCYTE [DISTWIDTH] IN BLOOD BY AUTOMATED COUNT: 18.7 FL (ref 11.5–15)
ERYTHROCYTE [DISTWIDTH] IN BLOOD BY AUTOMATED COUNT: 18.8 FL (ref 11.5–15)
FIO2: 70
FLUAV RNA NPH QL NAA+NON-PROBE: NOT DETECTED
FLUBV RNA NPH QL NAA+NON-PROBE: NOT DETECTED
GLUCOSE SERPL-MCNC: 138 MG/DL (ref 74–99)
GLUCOSE SERPL-MCNC: 141 MG/DL (ref 74–99)
GLUCOSE UR STRIP-MCNC: NEGATIVE MG/DL
HADV DNA NPH QL NAA+NON-PROBE: NOT DETECTED
HCO3: 26.1 MMOL/L (ref 22–26)
HCOV 229E RNA NPH QL NAA+NON-PROBE: NOT DETECTED
HCOV HKU1 RNA NPH QL NAA+NON-PROBE: NOT DETECTED
HCOV NL63 RNA NPH QL NAA+NON-PROBE: NOT DETECTED
HCOV OC43 RNA NPH QL NAA+NON-PROBE: NOT DETECTED
HCT VFR BLD AUTO: 33.7 % (ref 34–48)
HCT VFR BLD AUTO: 34.9 % (ref 34–48)
HGB BLD-MCNC: 10 G/DL (ref 11.5–15.5)
HGB BLD-MCNC: 9.6 G/DL (ref 11.5–15.5)
HGB UR QL STRIP: ABNORMAL
HMPV RNA NPH QL NAA+NON-PROBE: NOT DETECTED
HPIV1 RNA NPH QL NAA+NON-PROBE: NOT DETECTED
HPIV2 RNA NPH QL NAA+NON-PROBE: NOT DETECTED
HPIV3 RNA NPH QL NAA+NON-PROBE: NOT DETECTED
HPIV4 RNA NPH QL NAA+NON-PROBE: NOT DETECTED
KETONES UR STRIP-MCNC: ABNORMAL MG/DL
LEUKOCYTE ESTERASE UR QL STRIP: ABNORMAL
LV EF: 58 %
LVEF MODALITY: NORMAL
LYMPHOCYTES # BLD: 0.52 E9/L (ref 1.5–4)
LYMPHOCYTES NFR BLD: 2 % (ref 20–42)
M PNEUMO DNA NPH QL NAA+NON-PROBE: NOT DETECTED
MAGNESIUM SERPL-MCNC: 1.7 MG/DL (ref 1.6–2.6)
MCH RBC QN AUTO: 21.6 PG (ref 26–35)
MCH RBC QN AUTO: 21.8 PG (ref 26–35)
MCHC RBC AUTO-ENTMCNC: 28.5 % (ref 32–34.5)
MCHC RBC AUTO-ENTMCNC: 28.7 % (ref 32–34.5)
MCV RBC AUTO: 75.5 FL (ref 80–99.9)
MCV RBC AUTO: 76.6 FL (ref 80–99.9)
METER GLUCOSE: 129 MG/DL (ref 74–99)
METER GLUCOSE: 158 MG/DL (ref 74–99)
METER GLUCOSE: 170 MG/DL (ref 74–99)
METER GLUCOSE: 171 MG/DL (ref 74–99)
METER GLUCOSE: 183 MG/DL (ref 74–99)
MODE: AC
MONOCYTES # BLD: 0.26 E9/L (ref 0.1–0.95)
MONOCYTES NFR BLD: 1 % (ref 2–12)
NEUTROPHILS # BLD: 25.03 E9/L (ref 1.8–7.3)
NEUTS SEG NFR BLD: 97 % (ref 43–80)
NITRITE UR QL STRIP: POSITIVE
NRBC BLD-RTO: 3 /100 WBC
O2 SATURATION: 93.3 % (ref 92–98.5)
OPERATOR ID: 789
OSMOLALITY URINE: 680 MOSM/KG (ref 300–900)
OVALOCYTES: ABNORMAL
PCO2 37: 48.1 MMHG (ref 35–45)
PH 37: 7.34 (ref 7.35–7.45)
PH UR STRIP: 5 [PH] (ref 5–9)
PHOSPHATE SERPL-MCNC: 5 MG/DL (ref 2.5–4.5)
PLATELET # BLD AUTO: 294 E9/L (ref 130–450)
PLATELET # BLD AUTO: 347 E9/L (ref 130–450)
PMV BLD AUTO: 10.5 FL (ref 7–12)
PMV BLD AUTO: 10.6 FL (ref 7–12)
PO2 37: 73 MMHG (ref 60–80)
POC SOURCE: ABNORMAL
POIKILOCYTES: ABNORMAL
POLYCHROMASIA: ABNORMAL
POSITIVE END EXP PRESS: 10 CMH2O
POTASSIUM SERPL-SCNC: 4 MMOL/L (ref 3.5–5)
POTASSIUM SERPL-SCNC: 4.3 MMOL/L (ref 3.5–5)
PROT SERPL-MCNC: 5.6 G/DL (ref 6.4–8.3)
PROT UR STRIP-MCNC: 100 MG/DL
RBC # BLD AUTO: 4.4 E12/L (ref 3.5–5.5)
RBC # BLD AUTO: 4.62 E12/L (ref 3.5–5.5)
RBC #/AREA URNS HPF: ABNORMAL /HPF (ref 0–2)
REASON FOR REJECTION: NORMAL
REJECTED TEST: NORMAL
RESPIRATORY RATE: 18 B/MIN
RSV RNA NPH QL NAA+NON-PROBE: NOT DETECTED
RV+EV RNA NPH QL NAA+NON-PROBE: NOT DETECTED
SARS-COV-2 RNA NPH QL NAA+NON-PROBE: NOT DETECTED
SODIUM SERPL-SCNC: 138 MMOL/L (ref 132–146)
SODIUM SERPL-SCNC: 139 MMOL/L (ref 132–146)
SODIUM UR-SCNC: 176 MMOL/L
SP GR UR STRIP: >=1.03 (ref 1–1.03)
TIDAL VOLUME: 450 ML
UREA NITROGEN, UR: 814 MG/DL (ref 800–1666)
UROBILINOGEN UR STRIP-ACNC: 2 E.U./DL
WBC # BLD: 19.3 E9/L (ref 4.5–11.5)
WBC # BLD: 25.8 E9/L (ref 4.5–11.5)
WBC #/AREA URNS HPF: ABNORMAL /HPF (ref 0–5)

## 2023-06-22 PROCEDURE — 2500000003 HC RX 250 WO HCPCS: Performed by: STUDENT IN AN ORGANIZED HEALTH CARE EDUCATION/TRAINING PROGRAM

## 2023-06-22 PROCEDURE — 84100 ASSAY OF PHOSPHORUS: CPT

## 2023-06-22 PROCEDURE — 85025 COMPLETE CBC W/AUTO DIFF WBC: CPT

## 2023-06-22 PROCEDURE — 85730 THROMBOPLASTIN TIME PARTIAL: CPT

## 2023-06-22 PROCEDURE — 82570 ASSAY OF URINE CREATININE: CPT

## 2023-06-22 PROCEDURE — 84300 ASSAY OF URINE SODIUM: CPT

## 2023-06-22 PROCEDURE — 99232 SBSQ HOSP IP/OBS MODERATE 35: CPT | Performed by: STUDENT IN AN ORGANIZED HEALTH CARE EDUCATION/TRAINING PROGRAM

## 2023-06-22 PROCEDURE — 36415 COLL VENOUS BLD VENIPUNCTURE: CPT

## 2023-06-22 PROCEDURE — 87081 CULTURE SCREEN ONLY: CPT

## 2023-06-22 PROCEDURE — 99291 CRITICAL CARE FIRST HOUR: CPT | Performed by: NURSE PRACTITIONER

## 2023-06-22 PROCEDURE — 6360000002 HC RX W HCPCS: Performed by: STUDENT IN AN ORGANIZED HEALTH CARE EDUCATION/TRAINING PROGRAM

## 2023-06-22 PROCEDURE — 82436 ASSAY OF URINE CHLORIDE: CPT

## 2023-06-22 PROCEDURE — 80053 COMPREHEN METABOLIC PANEL: CPT

## 2023-06-22 PROCEDURE — 2580000003 HC RX 258: Performed by: NURSE PRACTITIONER

## 2023-06-22 PROCEDURE — 87186 SC STD MICRODIL/AGAR DIL: CPT

## 2023-06-22 PROCEDURE — 80048 BASIC METABOLIC PNL TOTAL CA: CPT

## 2023-06-22 PROCEDURE — 93306 TTE W/DOPPLER COMPLETE: CPT

## 2023-06-22 PROCEDURE — 84540 ASSAY OF URINE/UREA-N: CPT

## 2023-06-22 PROCEDURE — 93010 ELECTROCARDIOGRAM REPORT: CPT | Performed by: INTERNAL MEDICINE

## 2023-06-22 PROCEDURE — 6370000000 HC RX 637 (ALT 250 FOR IP): Performed by: NURSE PRACTITIONER

## 2023-06-22 PROCEDURE — 99291 CRITICAL CARE FIRST HOUR: CPT | Performed by: INTERNAL MEDICINE

## 2023-06-22 PROCEDURE — 83735 ASSAY OF MAGNESIUM: CPT

## 2023-06-22 PROCEDURE — 6360000002 HC RX W HCPCS: Performed by: FAMILY MEDICINE

## 2023-06-22 PROCEDURE — A4216 STERILE WATER/SALINE, 10 ML: HCPCS | Performed by: NURSE PRACTITIONER

## 2023-06-22 PROCEDURE — 83935 ASSAY OF URINE OSMOLALITY: CPT

## 2023-06-22 PROCEDURE — 2580000003 HC RX 258: Performed by: FAMILY MEDICINE

## 2023-06-22 PROCEDURE — 6360000002 HC RX W HCPCS: Performed by: INTERNAL MEDICINE

## 2023-06-22 PROCEDURE — 2500000003 HC RX 250 WO HCPCS

## 2023-06-22 PROCEDURE — 85027 COMPLETE CBC AUTOMATED: CPT

## 2023-06-22 PROCEDURE — 87206 SMEAR FLUORESCENT/ACID STAI: CPT

## 2023-06-22 PROCEDURE — C9113 INJ PANTOPRAZOLE SODIUM, VIA: HCPCS | Performed by: NURSE PRACTITIONER

## 2023-06-22 PROCEDURE — 87205 SMEAR GRAM STAIN: CPT

## 2023-06-22 PROCEDURE — 6360000002 HC RX W HCPCS: Performed by: NURSE PRACTITIONER

## 2023-06-22 PROCEDURE — 36592 COLLECT BLOOD FROM PICC: CPT

## 2023-06-22 PROCEDURE — 2580000003 HC RX 258: Performed by: INTERNAL MEDICINE

## 2023-06-22 PROCEDURE — 2580000003 HC RX 258: Performed by: STUDENT IN AN ORGANIZED HEALTH CARE EDUCATION/TRAINING PROGRAM

## 2023-06-22 PROCEDURE — 87070 CULTURE OTHR SPECIMN AEROBIC: CPT

## 2023-06-22 PROCEDURE — 87077 CULTURE AEROBIC IDENTIFY: CPT

## 2023-06-22 PROCEDURE — 71045 X-RAY EXAM CHEST 1 VIEW: CPT

## 2023-06-22 PROCEDURE — 82803 BLOOD GASES ANY COMBINATION: CPT

## 2023-06-22 PROCEDURE — 94002 VENT MGMT INPAT INIT DAY: CPT

## 2023-06-22 PROCEDURE — 87449 NOS EACH ORGANISM AG IA: CPT

## 2023-06-22 PROCEDURE — 94003 VENT MGMT INPAT SUBQ DAY: CPT

## 2023-06-22 PROCEDURE — 94640 AIRWAY INHALATION TREATMENT: CPT

## 2023-06-22 PROCEDURE — 2000000000 HC ICU R&B

## 2023-06-22 PROCEDURE — 82962 GLUCOSE BLOOD TEST: CPT

## 2023-06-22 RX ORDER — DEXTROSE MONOHYDRATE 100 MG/ML
INJECTION, SOLUTION INTRAVENOUS CONTINUOUS PRN
Status: DISCONTINUED | OUTPATIENT
Start: 2023-06-22 | End: 2023-06-30 | Stop reason: HOSPADM

## 2023-06-22 RX ORDER — INSULIN LISPRO 100 [IU]/ML
0-4 INJECTION, SOLUTION INTRAVENOUS; SUBCUTANEOUS EVERY 4 HOURS
Status: DISCONTINUED | OUTPATIENT
Start: 2023-06-22 | End: 2023-06-25

## 2023-06-22 RX ORDER — IPRATROPIUM BROMIDE AND ALBUTEROL SULFATE 2.5; .5 MG/3ML; MG/3ML
1 SOLUTION RESPIRATORY (INHALATION) EVERY 4 HOURS
Status: DISCONTINUED | OUTPATIENT
Start: 2023-06-22 | End: 2023-06-24

## 2023-06-22 RX ORDER — FERROUS SULFATE 300 MG/5ML
300 LIQUID (ML) ORAL 2 TIMES DAILY
Status: DISCONTINUED | OUTPATIENT
Start: 2023-06-22 | End: 2023-06-26

## 2023-06-22 RX ADMIN — MINERAL SUPPLEMENT IRON 300 MG / 5 ML STRENGTH LIQUID 100 PER BOX UNFLAVORED 300 MG: at 09:01

## 2023-06-22 RX ADMIN — IPRATROPIUM BROMIDE AND ALBUTEROL SULFATE 1 DOSE: .5; 2.5 SOLUTION RESPIRATORY (INHALATION) at 14:38

## 2023-06-22 RX ADMIN — ANTI-FUNGAL POWDER MICONAZOLE NITRATE TALC FREE: 1.42 POWDER TOPICAL at 13:53

## 2023-06-22 RX ADMIN — IPRATROPIUM BROMIDE AND ALBUTEROL SULFATE 1 DOSE: .5; 2.5 SOLUTION RESPIRATORY (INHALATION) at 18:50

## 2023-06-22 RX ADMIN — HEPARIN SODIUM 10000 UNITS: 1000 INJECTION, SOLUTION INTRAVENOUS; SUBCUTANEOUS at 00:53

## 2023-06-22 RX ADMIN — ANTI-FUNGAL POWDER MICONAZOLE NITRATE TALC FREE: 1.42 POWDER TOPICAL at 21:43

## 2023-06-22 RX ADMIN — HEPARIN SODIUM 5000 UNITS: 1000 INJECTION INTRAVENOUS; SUBCUTANEOUS at 15:47

## 2023-06-22 RX ADMIN — SODIUM CHLORIDE, PRESERVATIVE FREE 10 ML: 5 INJECTION INTRAVENOUS at 09:02

## 2023-06-22 RX ADMIN — HEPARIN SODIUM 16 UNITS/KG/HR: 10000 INJECTION, SOLUTION INTRAVENOUS at 00:53

## 2023-06-22 RX ADMIN — PIPERACILLIN AND TAZOBACTAM 3375 MG: 3; .375 INJECTION, POWDER, LYOPHILIZED, FOR SOLUTION INTRAVENOUS at 21:50

## 2023-06-22 RX ADMIN — SODIUM CHLORIDE 8.3 MG/HR: 9 INJECTION, SOLUTION INTRAVENOUS at 08:38

## 2023-06-22 RX ADMIN — SODIUM CHLORIDE: 9 INJECTION, SOLUTION INTRAVENOUS at 12:44

## 2023-06-22 RX ADMIN — SODIUM CHLORIDE: 9 INJECTION, SOLUTION INTRAVENOUS at 02:36

## 2023-06-22 RX ADMIN — PIPERACILLIN AND TAZOBACTAM 3375 MG: 3; .375 INJECTION, POWDER, LYOPHILIZED, FOR SOLUTION INTRAVENOUS at 14:17

## 2023-06-22 RX ADMIN — IPRATROPIUM BROMIDE AND ALBUTEROL SULFATE 1 DOSE: .5; 2.5 SOLUTION RESPIRATORY (INHALATION) at 22:37

## 2023-06-22 RX ADMIN — HYDROCORTISONE SODIUM SUCCINATE 100 MG: 100 INJECTION, POWDER, FOR SOLUTION INTRAMUSCULAR; INTRAVENOUS at 02:14

## 2023-06-22 RX ADMIN — AZITHROMYCIN MONOHYDRATE 500 MG: 500 INJECTION, POWDER, LYOPHILIZED, FOR SOLUTION INTRAVENOUS at 02:26

## 2023-06-22 RX ADMIN — NOREPINEPHRINE BITARTRATE 15 MCG/MIN: 1 INJECTION, SOLUTION, CONCENTRATE INTRAVENOUS at 12:40

## 2023-06-22 RX ADMIN — HEPARIN SODIUM 15 UNITS/KG/HR: 10000 INJECTION, SOLUTION INTRAVENOUS at 17:08

## 2023-06-22 RX ADMIN — FENTANYL CITRATE 100 MCG/HR: 0.05 INJECTION, SOLUTION INTRAMUSCULAR; INTRAVENOUS at 12:42

## 2023-06-22 RX ADMIN — IPRATROPIUM BROMIDE AND ALBUTEROL SULFATE 1 DOSE: .5; 2.5 SOLUTION RESPIRATORY (INHALATION) at 05:28

## 2023-06-22 RX ADMIN — MINERAL SUPPLEMENT IRON 300 MG / 5 ML STRENGTH LIQUID 100 PER BOX UNFLAVORED 300 MG: at 21:49

## 2023-06-22 RX ADMIN — PIPERACILLIN AND TAZOBACTAM 3375 MG: 3; .375 INJECTION, POWDER, LYOPHILIZED, FOR SOLUTION INTRAVENOUS at 07:14

## 2023-06-22 RX ADMIN — SODIUM CHLORIDE, PRESERVATIVE FREE 10 ML: 5 INJECTION INTRAVENOUS at 21:44

## 2023-06-22 RX ADMIN — SODIUM CHLORIDE 40 MG: 9 INJECTION INTRAMUSCULAR; INTRAVENOUS; SUBCUTANEOUS at 09:01

## 2023-06-22 RX ADMIN — IPRATROPIUM BROMIDE AND ALBUTEROL SULFATE 1 DOSE: .5; 2.5 SOLUTION RESPIRATORY (INHALATION) at 08:43

## 2023-06-22 ASSESSMENT — PULMONARY FUNCTION TESTS
PIF_VALUE: 23
PIF_VALUE: 29
PIF_VALUE: 23
PIF_VALUE: 24
PIF_VALUE: 26
PIF_VALUE: 25
PIF_VALUE: 26
PIF_VALUE: 25
PIF_VALUE: 23
PIF_VALUE: 24
PIF_VALUE: 23
PIF_VALUE: 32
PIF_VALUE: 23
PIF_VALUE: 22
PIF_VALUE: 27
PIF_VALUE: 23
PIF_VALUE: 22
PIF_VALUE: 22
PIF_VALUE: 31
PIF_VALUE: 23
PIF_VALUE: 25
PIF_VALUE: 22
PIF_VALUE: 25
PIF_VALUE: 23

## 2023-06-22 ASSESSMENT — PAIN SCALES - GENERAL
PAINLEVEL_OUTOF10: 0

## 2023-06-23 ENCOUNTER — APPOINTMENT (OUTPATIENT)
Dept: GENERAL RADIOLOGY | Age: 83
End: 2023-06-23
Payer: MEDICARE

## 2023-06-23 LAB
A BAUMANNII DNA BLD POS QL NAA+NON-PROBE: NOT DETECTED
AADO2: 204.9 MMHG
ALBUMIN SERPL-MCNC: 2.4 G/DL (ref 3.5–5.2)
ALP SERPL-CCNC: 82 U/L (ref 35–104)
ALT SERPL-CCNC: 143 U/L (ref 0–32)
ANION GAP SERPL CALCULATED.3IONS-SCNC: 11 MMOL/L (ref 7–16)
ANISOCYTOSIS: ABNORMAL
APTT BLD: 145.3 SEC (ref 24.5–35.1)
APTT BLD: 64.7 SEC (ref 24.5–35.1)
AST SERPL-CCNC: 91 U/L (ref 0–31)
B.E.: 0.2 MMOL/L (ref -3–3)
BASOPHILS # BLD: 0 E9/L (ref 0–0.2)
BASOPHILS NFR BLD: 0.1 % (ref 0–2)
BILIRUB SERPL-MCNC: 0.4 MG/DL (ref 0–1.2)
BOTTLE TYPE: ABNORMAL
BUN SERPL-MCNC: 48 MG/DL (ref 6–23)
C ALBICANS DNA BLD POS QL NAA+NON-PROBE: NOT DETECTED
C AURIS DNA BLD POS QL NAA+PROBE: NOT DETECTED
C GLABRATA DNA BLD POS QL NAA+NON-PROBE: NOT DETECTED
C KRUSEI DNA BLD POS QL NAA+NON-PROBE: NOT DETECTED
C PARAP DNA BLD POS QL NAA+NON-PROBE: NOT DETECTED
C TROPICLS DNA BLD POS QL NAA+NON-PROBE: NOT DETECTED
CA-I BLD-SCNC: 1.08 MMOL/L (ref 1.15–1.33)
CALCIUM SERPL-MCNC: 7.4 MG/DL (ref 8.6–10.2)
CHLORIDE SERPL-SCNC: 103 MMOL/L (ref 98–107)
CO2 SERPL-SCNC: 26 MMOL/L (ref 22–29)
COHB: 0.1 % (ref 0–1.5)
CREAT SERPL-MCNC: 0.9 MG/DL (ref 0.5–1)
CRITICAL: ABNORMAL
CRYPTOCOCCUS NEOFORMANS/GATTII BY PCR: NOT DETECTED
DATE ANALYZED: ABNORMAL
DATE OF COLLECTION: ABNORMAL
E CLOAC COMP DNA BLD POS NAA+NON-PROBE: NOT DETECTED
E COLI DNA BLD POS QL NAA+NON-PROBE: NOT DETECTED
E FAECALIS DNA BLD POS QL NAA+PROBE: NOT DETECTED
E FAECIUM DNA BLD POS QL NAA+PROBE: NOT DETECTED
ENTEROBACT DNA BLD POS QL NAA+NON-PROBE: NOT DETECTED
ENTEROCOC DNA BLD POS QL NAA+NON-PROBE: NOT DETECTED
EOSINOPHIL # BLD: 0 E9/L (ref 0.05–0.5)
EOSINOPHIL NFR BLD: 0 % (ref 0–6)
ERYTHROCYTE [DISTWIDTH] IN BLOOD BY AUTOMATED COUNT: 18.6 FL (ref 11.5–15)
FIO2: 50 %
GLUCOSE SERPL-MCNC: 171 MG/DL (ref 74–99)
GN BLD CULTURE PNL BLD POS NAA+PROBE: NOT DETECTED
HCO3: 25.4 MMOL/L (ref 22–26)
HCT VFR BLD AUTO: 29.3 % (ref 34–48)
HGB BLD-MCNC: 8.5 G/DL (ref 11.5–15.5)
HHB: 4.6 % (ref 0–5)
HYPOCHROMIA: ABNORMAL
INR BLD: 1.6
K OXYTOCA DNA BLD POS QL NAA+NON-PROBE: NOT DETECTED
K PNEUMON DNA SPEC QL NAA+PROBE: NOT DETECTED
K. AEROGENES DNA SPEC QL NAA+PROBE: NOT DETECTED
L MONOCYTOG DNA BLD POS QL NAA+NON-PROBE: NOT DETECTED
LAB: ABNORMAL
LEGIONELLA AG UR QL: NORMAL
LYMPHOCYTES # BLD: 0.72 E9/L (ref 1.5–4)
LYMPHOCYTES NFR BLD: 2.6 % (ref 20–42)
Lab: ABNORMAL
MAGNESIUM SERPL-MCNC: 1.7 MG/DL (ref 1.6–2.6)
MCH RBC QN AUTO: 21.6 PG (ref 26–35)
MCHC RBC AUTO-ENTMCNC: 29 % (ref 32–34.5)
MCV RBC AUTO: 74.6 FL (ref 80–99.9)
MECA BLD POS QL NAA+NON-PROBE: DETECTED
METER GLUCOSE: 166 MG/DL (ref 74–99)
METER GLUCOSE: 171 MG/DL (ref 74–99)
METER GLUCOSE: 188 MG/DL (ref 74–99)
METER GLUCOSE: 191 MG/DL (ref 74–99)
METER GLUCOSE: 214 MG/DL (ref 74–99)
METHB: 0 % (ref 0–1.5)
MODE: AC
MONOCYTES # BLD: 0.48 E9/L (ref 0.1–0.95)
MONOCYTES NFR BLD: 1.7 % (ref 2–12)
MRSA SPEC QL CULT: NORMAL
N MEN DNA BLD POS QL NAA+NON-PROBE: NOT DETECTED
NEUTROPHILS # BLD: 22.94 E9/L (ref 1.8–7.3)
NEUTS SEG NFR BLD: 95.7 % (ref 43–80)
NRBC BLD-RTO: 1.7 /100 WBC
O2 CONTENT: 13 ML/DL
O2 SATURATION: 95.4 % (ref 92–98.5)
O2HB: 95.3 % (ref 94–97)
OPERATOR ID: 8219
ORDER NUMBER: ABNORMAL
OVALOCYTES: ABNORMAL
P AERUGINOSA DNA BLD POS NAA+NON-PROBE: NOT DETECTED
PATIENT TEMP: 37 C
PCO2: 43.5 MMHG (ref 35–45)
PEEP/CPAP: 10 CMH2O
PFO2: 1.8 MMHG/%
PH BLOOD GAS: 7.38 (ref 7.35–7.45)
PHOSPHATE SERPL-MCNC: 3.4 MG/DL (ref 2.5–4.5)
PLATELET # BLD AUTO: 337 E9/L (ref 130–450)
PMV BLD AUTO: 11 FL (ref 7–12)
PO2: 90.2 MMHG (ref 75–100)
POIKILOCYTES: ABNORMAL
POLYCHROMASIA: ABNORMAL
POTASSIUM SERPL-SCNC: 4.3 MMOL/L (ref 3.5–5)
PROT SERPL-MCNC: 5.2 G/DL (ref 6.4–8.3)
PROTEUS SP DNA BLD POS QL NAA+NON-PROBE: NOT DETECTED
PROTHROMBIN TIME: 18.4 SEC (ref 9.3–12.4)
RBC # BLD AUTO: 3.93 E12/L (ref 3.5–5.5)
RI(T): 2.27
RR MECHANICAL: 18 B/MIN
S AUREUS DNA BLD POS QL NAA+NON-PROBE: NOT DETECTED
S AUREUS+CONS DNA BLD POS NAA+NON-PROBE: DETECTED
S EPIDERMIDIS DNA BLD POS QL NAA+PROBE: DETECTED
S LUGDUNENSIS DNA BLD POS QL NAA+PROBE: NOT DETECTED
S MALTOPH DNA BLD POS QL NAA+PROBE: NOT DETECTED
S MARCESCENS DNA BLD POS NAA+NON-PROBE: NOT DETECTED
S PNEUM AG SPEC QL: NORMAL
S PNEUM DNA BLD POS QL NAA+NON-PROBE: NOT DETECTED
S PYO DNA BLD POS QL NAA+NON-PROBE: NOT DETECTED
SALMONELLA DNA BLD POS QL NAA+PROBE: NOT DETECTED
SODIUM SERPL-SCNC: 140 MMOL/L (ref 132–146)
SOURCE OF BLOOD CULTURE: ABNORMAL
SOURCE, BLOOD GAS: ABNORMAL
STREPTOCOCCUS AGALACTIAE BY PCR: NOT DETECTED
STREPTOCOCCUS DNA BLD POS NAA+NON-PROBE: NOT DETECTED
TARGET CELLS: ABNORMAL
THB: 9.6 G/DL (ref 11.5–16.5)
TIME ANALYZED: 504
VANCOMYCIN SERPL-MCNC: 7.7 MCG/ML (ref 5–40)
VT MECHANICAL: 450 ML
WBC # BLD: 23.9 E9/L (ref 4.5–11.5)

## 2023-06-23 PROCEDURE — 2000000000 HC ICU R&B

## 2023-06-23 PROCEDURE — 36415 COLL VENOUS BLD VENIPUNCTURE: CPT

## 2023-06-23 PROCEDURE — 99291 CRITICAL CARE FIRST HOUR: CPT | Performed by: INTERNAL MEDICINE

## 2023-06-23 PROCEDURE — 82962 GLUCOSE BLOOD TEST: CPT

## 2023-06-23 PROCEDURE — 71045 X-RAY EXAM CHEST 1 VIEW: CPT

## 2023-06-23 PROCEDURE — 94003 VENT MGMT INPAT SUBQ DAY: CPT

## 2023-06-23 PROCEDURE — 99233 SBSQ HOSP IP/OBS HIGH 50: CPT | Performed by: INTERNAL MEDICINE

## 2023-06-23 PROCEDURE — 82330 ASSAY OF CALCIUM: CPT

## 2023-06-23 PROCEDURE — 2580000003 HC RX 258: Performed by: INTERNAL MEDICINE

## 2023-06-23 PROCEDURE — A4216 STERILE WATER/SALINE, 10 ML: HCPCS | Performed by: INTERNAL MEDICINE

## 2023-06-23 PROCEDURE — 6360000002 HC RX W HCPCS: Performed by: FAMILY MEDICINE

## 2023-06-23 PROCEDURE — 6360000002 HC RX W HCPCS: Performed by: STUDENT IN AN ORGANIZED HEALTH CARE EDUCATION/TRAINING PROGRAM

## 2023-06-23 PROCEDURE — 2580000003 HC RX 258: Performed by: STUDENT IN AN ORGANIZED HEALTH CARE EDUCATION/TRAINING PROGRAM

## 2023-06-23 PROCEDURE — 6360000002 HC RX W HCPCS: Performed by: NURSE PRACTITIONER

## 2023-06-23 PROCEDURE — 82805 BLOOD GASES W/O2 SATURATION: CPT

## 2023-06-23 PROCEDURE — 85730 THROMBOPLASTIN TIME PARTIAL: CPT

## 2023-06-23 PROCEDURE — 94640 AIRWAY INHALATION TREATMENT: CPT

## 2023-06-23 PROCEDURE — 85025 COMPLETE CBC W/AUTO DIFF WBC: CPT

## 2023-06-23 PROCEDURE — 83735 ASSAY OF MAGNESIUM: CPT

## 2023-06-23 PROCEDURE — 6370000000 HC RX 637 (ALT 250 FOR IP): Performed by: NURSE PRACTITIONER

## 2023-06-23 PROCEDURE — C9113 INJ PANTOPRAZOLE SODIUM, VIA: HCPCS | Performed by: INTERNAL MEDICINE

## 2023-06-23 PROCEDURE — 2500000003 HC RX 250 WO HCPCS: Performed by: STUDENT IN AN ORGANIZED HEALTH CARE EDUCATION/TRAINING PROGRAM

## 2023-06-23 PROCEDURE — 36592 COLLECT BLOOD FROM PICC: CPT

## 2023-06-23 PROCEDURE — 2580000003 HC RX 258: Performed by: NURSE PRACTITIONER

## 2023-06-23 PROCEDURE — 80053 COMPREHEN METABOLIC PANEL: CPT

## 2023-06-23 PROCEDURE — 80202 ASSAY OF VANCOMYCIN: CPT

## 2023-06-23 PROCEDURE — 84100 ASSAY OF PHOSPHORUS: CPT

## 2023-06-23 PROCEDURE — A4216 STERILE WATER/SALINE, 10 ML: HCPCS | Performed by: NURSE PRACTITIONER

## 2023-06-23 PROCEDURE — 6360000002 HC RX W HCPCS: Performed by: INTERNAL MEDICINE

## 2023-06-23 PROCEDURE — 2580000003 HC RX 258: Performed by: FAMILY MEDICINE

## 2023-06-23 PROCEDURE — C9113 INJ PANTOPRAZOLE SODIUM, VIA: HCPCS | Performed by: NURSE PRACTITIONER

## 2023-06-23 PROCEDURE — 6370000000 HC RX 637 (ALT 250 FOR IP): Performed by: INTERNAL MEDICINE

## 2023-06-23 PROCEDURE — 6370000000 HC RX 637 (ALT 250 FOR IP): Performed by: FAMILY MEDICINE

## 2023-06-23 PROCEDURE — 85610 PROTHROMBIN TIME: CPT

## 2023-06-23 RX ORDER — BUDESONIDE 0.5 MG/2ML
0.5 INHALANT ORAL 2 TIMES DAILY
Status: DISCONTINUED | OUTPATIENT
Start: 2023-06-23 | End: 2023-06-30 | Stop reason: HOSPADM

## 2023-06-23 RX ORDER — TRIAMCINOLONE ACETONIDE 1 MG/G
CREAM TOPICAL 2 TIMES DAILY
Status: DISCONTINUED | OUTPATIENT
Start: 2023-06-23 | End: 2023-06-30 | Stop reason: HOSPADM

## 2023-06-23 RX ORDER — ENOXAPARIN SODIUM 150 MG/ML
1 INJECTION SUBCUTANEOUS 2 TIMES DAILY
Status: DISCONTINUED | OUTPATIENT
Start: 2023-06-23 | End: 2023-06-25

## 2023-06-23 RX ORDER — POLYETHYLENE GLYCOL 3350 17 G/17G
17 POWDER, FOR SOLUTION ORAL DAILY
Status: DISCONTINUED | OUTPATIENT
Start: 2023-06-23 | End: 2023-06-30 | Stop reason: HOSPADM

## 2023-06-23 RX ORDER — DESONIDE 0.5 MG/G
1 CREAM TOPICAL 2 TIMES DAILY
COMMUNITY

## 2023-06-23 RX ORDER — LATANOPROST 50 UG/ML
1 SOLUTION/ DROPS OPHTHALMIC NIGHTLY
Status: DISCONTINUED | OUTPATIENT
Start: 2023-06-23 | End: 2023-06-30 | Stop reason: HOSPADM

## 2023-06-23 RX ADMIN — SODIUM CHLORIDE 8.3 MG/HR: 9 INJECTION, SOLUTION INTRAVENOUS at 08:44

## 2023-06-23 RX ADMIN — TRIAMCINOLONE ACETONIDE: 1 CREAM TOPICAL at 21:11

## 2023-06-23 RX ADMIN — SODIUM CHLORIDE 40 MG: 9 INJECTION INTRAMUSCULAR; INTRAVENOUS; SUBCUTANEOUS at 21:10

## 2023-06-23 RX ADMIN — IPRATROPIUM BROMIDE AND ALBUTEROL SULFATE 1 DOSE: .5; 2.5 SOLUTION RESPIRATORY (INHALATION) at 05:12

## 2023-06-23 RX ADMIN — SODIUM CHLORIDE: 9 INJECTION, SOLUTION INTRAVENOUS at 06:32

## 2023-06-23 RX ADMIN — NOREPINEPHRINE BITARTRATE 8 MCG/MIN: 1 INJECTION, SOLUTION, CONCENTRATE INTRAVENOUS at 16:38

## 2023-06-23 RX ADMIN — PIPERACILLIN AND TAZOBACTAM 3375 MG: 3; .375 INJECTION, POWDER, LYOPHILIZED, FOR SOLUTION INTRAVENOUS at 22:02

## 2023-06-23 RX ADMIN — VANCOMYCIN HYDROCHLORIDE 1250 MG: 10 INJECTION, POWDER, LYOPHILIZED, FOR SOLUTION INTRAVENOUS at 13:29

## 2023-06-23 RX ADMIN — IPRATROPIUM BROMIDE AND ALBUTEROL SULFATE 1 DOSE: .5; 2.5 SOLUTION RESPIRATORY (INHALATION) at 14:15

## 2023-06-23 RX ADMIN — MINERAL SUPPLEMENT IRON 300 MG / 5 ML STRENGTH LIQUID 100 PER BOX UNFLAVORED 300 MG: at 21:10

## 2023-06-23 RX ADMIN — ENOXAPARIN SODIUM 120 MG: 150 INJECTION SUBCUTANEOUS at 14:34

## 2023-06-23 RX ADMIN — IPRATROPIUM BROMIDE AND ALBUTEROL SULFATE 1 DOSE: .5; 2.5 SOLUTION RESPIRATORY (INHALATION) at 09:28

## 2023-06-23 RX ADMIN — HYDROCORTISONE: 25 CREAM TOPICAL at 21:11

## 2023-06-23 RX ADMIN — FENTANYL CITRATE 100 MCG/HR: 0.05 INJECTION, SOLUTION INTRAMUSCULAR; INTRAVENOUS at 03:15

## 2023-06-23 RX ADMIN — ANTI-FUNGAL POWDER MICONAZOLE NITRATE TALC FREE: 1.42 POWDER TOPICAL at 21:10

## 2023-06-23 RX ADMIN — ENOXAPARIN SODIUM 120 MG: 150 INJECTION SUBCUTANEOUS at 21:10

## 2023-06-23 RX ADMIN — MINERAL SUPPLEMENT IRON 300 MG / 5 ML STRENGTH LIQUID 100 PER BOX UNFLAVORED 300 MG: at 08:45

## 2023-06-23 RX ADMIN — PIPERACILLIN AND TAZOBACTAM 3375 MG: 3; .375 INJECTION, POWDER, LYOPHILIZED, FOR SOLUTION INTRAVENOUS at 14:20

## 2023-06-23 RX ADMIN — IPRATROPIUM BROMIDE AND ALBUTEROL SULFATE 1 DOSE: .5; 2.5 SOLUTION RESPIRATORY (INHALATION) at 02:17

## 2023-06-23 RX ADMIN — FENTANYL CITRATE 75 MCG/HR: 0.05 INJECTION, SOLUTION INTRAMUSCULAR; INTRAVENOUS at 16:01

## 2023-06-23 RX ADMIN — IPRATROPIUM BROMIDE AND ALBUTEROL SULFATE 1 DOSE: .5; 2.5 SOLUTION RESPIRATORY (INHALATION) at 18:49

## 2023-06-23 RX ADMIN — SODIUM CHLORIDE, PRESERVATIVE FREE 10 ML: 5 INJECTION INTRAVENOUS at 21:14

## 2023-06-23 RX ADMIN — IPRATROPIUM BROMIDE AND ALBUTEROL SULFATE 1 DOSE: .5; 2.5 SOLUTION RESPIRATORY (INHALATION) at 22:35

## 2023-06-23 RX ADMIN — SODIUM CHLORIDE, PRESERVATIVE FREE 10 ML: 5 INJECTION INTRAVENOUS at 08:47

## 2023-06-23 RX ADMIN — HEPARIN SODIUM 5000 UNITS: 1000 INJECTION INTRAVENOUS; SUBCUTANEOUS at 05:56

## 2023-06-23 RX ADMIN — LATANOPROST 1 DROP: 50 SOLUTION OPHTHALMIC at 21:11

## 2023-06-23 RX ADMIN — HEPARIN SODIUM 17 UNITS/KG/HR: 10000 INJECTION, SOLUTION INTRAVENOUS at 07:59

## 2023-06-23 RX ADMIN — SODIUM CHLORIDE 40 MG: 9 INJECTION INTRAMUSCULAR; INTRAVENOUS; SUBCUTANEOUS at 08:46

## 2023-06-23 RX ADMIN — POLYETHYLENE GLYCOL 3350 17 G: 17 POWDER, FOR SOLUTION ORAL at 17:24

## 2023-06-23 RX ADMIN — INSULIN LISPRO 1 UNITS: 100 INJECTION, SOLUTION INTRAVENOUS; SUBCUTANEOUS at 22:15

## 2023-06-23 RX ADMIN — PIPERACILLIN AND TAZOBACTAM 3375 MG: 3; .375 INJECTION, POWDER, LYOPHILIZED, FOR SOLUTION INTRAVENOUS at 06:31

## 2023-06-23 RX ADMIN — BUDESONIDE INHALATION 500 MCG: 0.5 SUSPENSION RESPIRATORY (INHALATION) at 18:49

## 2023-06-23 RX ADMIN — AZITHROMYCIN MONOHYDRATE 500 MG: 500 INJECTION, POWDER, LYOPHILIZED, FOR SOLUTION INTRAVENOUS at 02:42

## 2023-06-23 RX ADMIN — ANTI-FUNGAL POWDER MICONAZOLE NITRATE TALC FREE: 1.42 POWDER TOPICAL at 08:46

## 2023-06-23 ASSESSMENT — PULMONARY FUNCTION TESTS
PIF_VALUE: 23
PIF_VALUE: 24
PIF_VALUE: 23
PIF_VALUE: 22
PIF_VALUE: 30
PIF_VALUE: 23
PIF_VALUE: 24
PIF_VALUE: 24
PIF_VALUE: 23
PIF_VALUE: 24
PIF_VALUE: 23
PIF_VALUE: 24
PIF_VALUE: 24
PIF_VALUE: 23

## 2023-06-23 ASSESSMENT — PAIN SCALES - GENERAL
PAINLEVEL_OUTOF10: 0

## 2023-06-24 LAB
ACANTHOCYTES: ABNORMAL
ALBUMIN SERPL-MCNC: 2.4 G/DL (ref 3.5–5.2)
ALP SERPL-CCNC: 102 U/L (ref 35–104)
ALT SERPL-CCNC: 95 U/L (ref 0–32)
ANION GAP SERPL CALCULATED.3IONS-SCNC: 6 MMOL/L (ref 7–16)
ANISOCYTOSIS: ABNORMAL
AST SERPL-CCNC: 34 U/L (ref 0–31)
BACTERIA SPEC RESP CULT: ABNORMAL
BACTERIA SPEC RESP CULT: ABNORMAL
BACTERIA UR CULT: NORMAL
BACTERIA WND AEROBE CULT: ABNORMAL
BACTERIA WND AEROBE CULT: ABNORMAL
BASOPHILIC STIPPLING: ABNORMAL
BASOPHILS # BLD: 0 E9/L (ref 0–0.2)
BASOPHILS NFR BLD: 0.1 % (ref 0–2)
BILIRUB SERPL-MCNC: 0.5 MG/DL (ref 0–1.2)
BUN SERPL-MCNC: 38 MG/DL (ref 6–23)
CA-I BLD-SCNC: 1.11 MMOL/L (ref 1.15–1.33)
CALCIUM SERPL-MCNC: 7.6 MG/DL (ref 8.6–10.2)
CHLORIDE SERPL-SCNC: 108 MMOL/L (ref 98–107)
CO2 SERPL-SCNC: 29 MMOL/L (ref 22–29)
CREAT SERPL-MCNC: 0.7 MG/DL (ref 0.5–1)
EOSINOPHIL # BLD: 0 E9/L (ref 0.05–0.5)
EOSINOPHIL NFR BLD: 0 % (ref 0–6)
ERYTHROCYTE [DISTWIDTH] IN BLOOD BY AUTOMATED COUNT: 18.7 FL (ref 11.5–15)
FERRITIN SERPL-MCNC: 67 NG/ML
FOLATE SERPL-MCNC: 6.5 NG/ML (ref 4.8–24.2)
GLUCOSE SERPL-MCNC: 211 MG/DL (ref 74–99)
HCT VFR BLD AUTO: 27.5 % (ref 34–48)
HGB BLD-MCNC: 7.9 G/DL (ref 11.5–15.5)
HYPOCHROMIA: ABNORMAL
IRON SATN MFR SERPL: 6 % (ref 15–50)
IRON SERPL-MCNC: 14 MCG/DL (ref 37–145)
LYMPHOCYTES # BLD: 0.2 E9/L (ref 1.5–4)
LYMPHOCYTES NFR BLD: 0.9 % (ref 20–42)
MAGNESIUM SERPL-MCNC: 1.9 MG/DL (ref 1.6–2.6)
MCH RBC QN AUTO: 21.6 PG (ref 26–35)
MCHC RBC AUTO-ENTMCNC: 28.7 % (ref 32–34.5)
MCV RBC AUTO: 75.1 FL (ref 80–99.9)
METER GLUCOSE: 199 MG/DL (ref 74–99)
METER GLUCOSE: 202 MG/DL (ref 74–99)
METER GLUCOSE: 204 MG/DL (ref 74–99)
METER GLUCOSE: 214 MG/DL (ref 74–99)
METER GLUCOSE: 215 MG/DL (ref 74–99)
METER GLUCOSE: 222 MG/DL (ref 74–99)
MONOCYTES # BLD: 1.19 E9/L (ref 0.1–0.95)
MONOCYTES NFR BLD: 6.1 % (ref 2–12)
NEUTROPHILS # BLD: 18.51 E9/L (ref 1.8–7.3)
NEUTS SEG NFR BLD: 93 % (ref 43–80)
NRBC BLD-RTO: 2.6 /100 WBC
ORGANISM: ABNORMAL
ORGANISM: ABNORMAL
OVALOCYTES: ABNORMAL
PHOSPHATE SERPL-MCNC: 1.9 MG/DL (ref 2.5–4.5)
PLATELET # BLD AUTO: 330 E9/L (ref 130–450)
PMV BLD AUTO: 10.8 FL (ref 7–12)
POIKILOCYTES: ABNORMAL
POLYCHROMASIA: ABNORMAL
POTASSIUM SERPL-SCNC: 4.3 MMOL/L (ref 3.5–5)
PROT SERPL-MCNC: 5.2 G/DL (ref 6.4–8.3)
RBC # BLD AUTO: 3.66 E12/L (ref 3.5–5.5)
SCHISTOCYTES: ABNORMAL
SMEAR, RESPIRATORY: ABNORMAL
SODIUM SERPL-SCNC: 143 MMOL/L (ref 132–146)
TIBC SERPL-MCNC: 237 MCG/DL (ref 250–450)
VANCOMYCIN SERPL-MCNC: 11 MCG/ML (ref 5–40)
VIT B12 SERPL-MCNC: 629 PG/ML (ref 211–946)
VITAMIN D 25-HYDROXY: 13 NG/ML (ref 30–100)
WBC # BLD: 19.9 E9/L (ref 4.5–11.5)

## 2023-06-24 PROCEDURE — 99231 SBSQ HOSP IP/OBS SF/LOW 25: CPT | Performed by: INTERNAL MEDICINE

## 2023-06-24 PROCEDURE — 6360000002 HC RX W HCPCS: Performed by: INTERNAL MEDICINE

## 2023-06-24 PROCEDURE — 82607 VITAMIN B-12: CPT

## 2023-06-24 PROCEDURE — 87186 SC STD MICRODIL/AGAR DIL: CPT

## 2023-06-24 PROCEDURE — 94003 VENT MGMT INPAT SUBQ DAY: CPT

## 2023-06-24 PROCEDURE — 99291 CRITICAL CARE FIRST HOUR: CPT | Performed by: INTERNAL MEDICINE

## 2023-06-24 PROCEDURE — 82962 GLUCOSE BLOOD TEST: CPT

## 2023-06-24 PROCEDURE — 83970 ASSAY OF PARATHORMONE: CPT

## 2023-06-24 PROCEDURE — 6370000000 HC RX 637 (ALT 250 FOR IP): Performed by: INTERNAL MEDICINE

## 2023-06-24 PROCEDURE — 87070 CULTURE OTHR SPECIMN AEROBIC: CPT

## 2023-06-24 PROCEDURE — 2580000003 HC RX 258: Performed by: NURSE PRACTITIONER

## 2023-06-24 PROCEDURE — 2580000003 HC RX 258: Performed by: INTERNAL MEDICINE

## 2023-06-24 PROCEDURE — 84100 ASSAY OF PHOSPHORUS: CPT

## 2023-06-24 PROCEDURE — 80202 ASSAY OF VANCOMYCIN: CPT

## 2023-06-24 PROCEDURE — 82728 ASSAY OF FERRITIN: CPT

## 2023-06-24 PROCEDURE — 2000000000 HC ICU R&B

## 2023-06-24 PROCEDURE — 2580000003 HC RX 258: Performed by: FAMILY MEDICINE

## 2023-06-24 PROCEDURE — 6360000002 HC RX W HCPCS: Performed by: FAMILY MEDICINE

## 2023-06-24 PROCEDURE — 31624 DX BRONCHOSCOPE/LAVAGE: CPT | Performed by: INTERNAL MEDICINE

## 2023-06-24 PROCEDURE — 2500000003 HC RX 250 WO HCPCS: Performed by: NURSE PRACTITIONER

## 2023-06-24 PROCEDURE — 87040 BLOOD CULTURE FOR BACTERIA: CPT

## 2023-06-24 PROCEDURE — 80053 COMPREHEN METABOLIC PANEL: CPT

## 2023-06-24 PROCEDURE — A4216 STERILE WATER/SALINE, 10 ML: HCPCS | Performed by: INTERNAL MEDICINE

## 2023-06-24 PROCEDURE — 6370000000 HC RX 637 (ALT 250 FOR IP): Performed by: NURSE PRACTITIONER

## 2023-06-24 PROCEDURE — 6370000000 HC RX 637 (ALT 250 FOR IP): Performed by: FAMILY MEDICINE

## 2023-06-24 PROCEDURE — 87206 SMEAR FLUORESCENT/ACID STAI: CPT

## 2023-06-24 PROCEDURE — 94640 AIRWAY INHALATION TREATMENT: CPT

## 2023-06-24 PROCEDURE — 83735 ASSAY OF MAGNESIUM: CPT

## 2023-06-24 PROCEDURE — 85025 COMPLETE CBC W/AUTO DIFF WBC: CPT

## 2023-06-24 PROCEDURE — 36592 COLLECT BLOOD FROM PICC: CPT

## 2023-06-24 PROCEDURE — 6360000002 HC RX W HCPCS: Performed by: STUDENT IN AN ORGANIZED HEALTH CARE EDUCATION/TRAINING PROGRAM

## 2023-06-24 PROCEDURE — 82306 VITAMIN D 25 HYDROXY: CPT

## 2023-06-24 PROCEDURE — 82746 ASSAY OF FOLIC ACID SERUM: CPT

## 2023-06-24 PROCEDURE — 83540 ASSAY OF IRON: CPT

## 2023-06-24 PROCEDURE — 87077 CULTURE AEROBIC IDENTIFY: CPT

## 2023-06-24 PROCEDURE — 31645 BRNCHSC W/THER ASPIR 1ST: CPT | Performed by: INTERNAL MEDICINE

## 2023-06-24 PROCEDURE — 2580000003 HC RX 258: Performed by: STUDENT IN AN ORGANIZED HEALTH CARE EDUCATION/TRAINING PROGRAM

## 2023-06-24 PROCEDURE — 6360000002 HC RX W HCPCS

## 2023-06-24 PROCEDURE — 83550 IRON BINDING TEST: CPT

## 2023-06-24 PROCEDURE — 82330 ASSAY OF CALCIUM: CPT

## 2023-06-24 PROCEDURE — 6360000002 HC RX W HCPCS: Performed by: NURSE PRACTITIONER

## 2023-06-24 PROCEDURE — C9113 INJ PANTOPRAZOLE SODIUM, VIA: HCPCS | Performed by: INTERNAL MEDICINE

## 2023-06-24 PROCEDURE — 0B9F8ZX DRAINAGE OF RIGHT LOWER LUNG LOBE, VIA NATURAL OR ARTIFICIAL OPENING ENDOSCOPIC, DIAGNOSTIC: ICD-10-PCS | Performed by: INTERNAL MEDICINE

## 2023-06-24 RX ORDER — MIDAZOLAM HYDROCHLORIDE 1 MG/ML
2 INJECTION INTRAMUSCULAR; INTRAVENOUS ONCE
Status: COMPLETED | OUTPATIENT
Start: 2023-06-24 | End: 2023-06-24

## 2023-06-24 RX ORDER — MIDAZOLAM HYDROCHLORIDE 1 MG/ML
INJECTION INTRAMUSCULAR; INTRAVENOUS
Status: COMPLETED
Start: 2023-06-24 | End: 2023-06-24

## 2023-06-24 RX ORDER — IPRATROPIUM BROMIDE AND ALBUTEROL SULFATE 2.5; .5 MG/3ML; MG/3ML
1 SOLUTION RESPIRATORY (INHALATION) 4 TIMES DAILY
Status: DISCONTINUED | OUTPATIENT
Start: 2023-06-24 | End: 2023-06-30 | Stop reason: HOSPADM

## 2023-06-24 RX ORDER — FENTANYL CITRATE-0.9 % NACL/PF 20 MCG/2ML
50 SYRINGE (ML) INTRAVENOUS EVERY 30 MIN PRN
Status: COMPLETED | OUTPATIENT
Start: 2023-06-24 | End: 2023-06-24

## 2023-06-24 RX ORDER — PROPOFOL 10 MG/ML
5-50 INJECTION, EMULSION INTRAVENOUS CONTINUOUS
Status: DISCONTINUED | OUTPATIENT
Start: 2023-06-24 | End: 2023-06-25

## 2023-06-24 RX ADMIN — AZITHROMYCIN MONOHYDRATE 500 MG: 500 INJECTION, POWDER, LYOPHILIZED, FOR SOLUTION INTRAVENOUS at 02:24

## 2023-06-24 RX ADMIN — SODIUM CHLORIDE: 9 INJECTION, SOLUTION INTRAVENOUS at 01:07

## 2023-06-24 RX ADMIN — MIDAZOLAM 2 MG: 1 INJECTION INTRAMUSCULAR; INTRAVENOUS at 12:27

## 2023-06-24 RX ADMIN — METHYLNALTREXONE BROMIDE 8 MG: 12 INJECTION, SOLUTION SUBCUTANEOUS at 12:51

## 2023-06-24 RX ADMIN — PROPOFOL 20 MCG/KG/MIN: 10 INJECTION, EMULSION INTRAVENOUS at 16:56

## 2023-06-24 RX ADMIN — HYDROCORTISONE: 25 CREAM TOPICAL at 08:38

## 2023-06-24 RX ADMIN — Medication 50 MCG: at 12:15

## 2023-06-24 RX ADMIN — IPRATROPIUM BROMIDE AND ALBUTEROL SULFATE 1 DOSE: .5; 2.5 SOLUTION RESPIRATORY (INHALATION) at 02:11

## 2023-06-24 RX ADMIN — ENOXAPARIN SODIUM 120 MG: 150 INJECTION SUBCUTANEOUS at 20:56

## 2023-06-24 RX ADMIN — ANTI-FUNGAL POWDER MICONAZOLE NITRATE TALC FREE: 1.42 POWDER TOPICAL at 08:35

## 2023-06-24 RX ADMIN — IPRATROPIUM BROMIDE AND ALBUTEROL SULFATE 1 DOSE: .5; 2.5 SOLUTION RESPIRATORY (INHALATION) at 13:10

## 2023-06-24 RX ADMIN — SODIUM PHOSPHATE, MONOBASIC, MONOHYDRATE AND SODIUM PHOSPHATE, DIBASIC, ANHYDROUS 20 MMOL: 276; 142 INJECTION, SOLUTION INTRAVENOUS at 08:34

## 2023-06-24 RX ADMIN — INSULIN LISPRO 1 UNITS: 100 INJECTION, SOLUTION INTRAVENOUS; SUBCUTANEOUS at 12:41

## 2023-06-24 RX ADMIN — POLYETHYLENE GLYCOL 3350 17 G: 17 POWDER, FOR SOLUTION ORAL at 08:40

## 2023-06-24 RX ADMIN — FENTANYL CITRATE 125 MCG/HR: 0.05 INJECTION, SOLUTION INTRAMUSCULAR; INTRAVENOUS at 11:07

## 2023-06-24 RX ADMIN — MIDAZOLAM HYDROCHLORIDE 2 MG: 1 INJECTION INTRAMUSCULAR; INTRAVENOUS at 12:27

## 2023-06-24 RX ADMIN — IPRATROPIUM BROMIDE AND ALBUTEROL SULFATE 1 DOSE: .5; 2.5 SOLUTION RESPIRATORY (INHALATION) at 16:57

## 2023-06-24 RX ADMIN — BUDESONIDE INHALATION 500 MCG: 0.5 SUSPENSION RESPIRATORY (INHALATION) at 05:26

## 2023-06-24 RX ADMIN — Medication 50 MCG: at 12:11

## 2023-06-24 RX ADMIN — HYDROCORTISONE SODIUM SUCCINATE 50 MG: 100 INJECTION, POWDER, FOR SOLUTION INTRAMUSCULAR; INTRAVENOUS at 18:30

## 2023-06-24 RX ADMIN — SODIUM CHLORIDE, PRESERVATIVE FREE 10 ML: 5 INJECTION INTRAVENOUS at 20:58

## 2023-06-24 RX ADMIN — PIPERACILLIN AND TAZOBACTAM 3375 MG: 3; .375 INJECTION, POWDER, LYOPHILIZED, FOR SOLUTION INTRAVENOUS at 16:13

## 2023-06-24 RX ADMIN — SODIUM CHLORIDE 40 MG: 9 INJECTION INTRAMUSCULAR; INTRAVENOUS; SUBCUTANEOUS at 08:36

## 2023-06-24 RX ADMIN — TRIAMCINOLONE ACETONIDE: 1 CREAM TOPICAL at 08:37

## 2023-06-24 RX ADMIN — MINERAL SUPPLEMENT IRON 300 MG / 5 ML STRENGTH LIQUID 100 PER BOX UNFLAVORED 300 MG: at 20:57

## 2023-06-24 RX ADMIN — BUDESONIDE INHALATION 500 MCG: 0.5 SUSPENSION RESPIRATORY (INHALATION) at 16:57

## 2023-06-24 RX ADMIN — FENTANYL CITRATE 125 MCG/HR: 0.05 INJECTION, SOLUTION INTRAMUSCULAR; INTRAVENOUS at 19:37

## 2023-06-24 RX ADMIN — INSULIN LISPRO 1 UNITS: 100 INJECTION, SOLUTION INTRAVENOUS; SUBCUTANEOUS at 15:20

## 2023-06-24 RX ADMIN — PIPERACILLIN AND TAZOBACTAM 3375 MG: 3; .375 INJECTION, POWDER, LYOPHILIZED, FOR SOLUTION INTRAVENOUS at 08:31

## 2023-06-24 RX ADMIN — HYDROCORTISONE SODIUM SUCCINATE 50 MG: 100 INJECTION, POWDER, FOR SOLUTION INTRAMUSCULAR; INTRAVENOUS at 12:59

## 2023-06-24 RX ADMIN — TRIAMCINOLONE ACETONIDE: 1 CREAM TOPICAL at 20:57

## 2023-06-24 RX ADMIN — PROPOFOL 10 MCG/KG/MIN: 10 INJECTION, EMULSION INTRAVENOUS at 10:38

## 2023-06-24 RX ADMIN — IPRATROPIUM BROMIDE AND ALBUTEROL SULFATE 1 DOSE: .5; 2.5 SOLUTION RESPIRATORY (INHALATION) at 05:26

## 2023-06-24 RX ADMIN — ENOXAPARIN SODIUM 120 MG: 150 INJECTION SUBCUTANEOUS at 08:34

## 2023-06-24 RX ADMIN — INSULIN LISPRO 1 UNITS: 100 INJECTION, SOLUTION INTRAVENOUS; SUBCUTANEOUS at 23:07

## 2023-06-24 RX ADMIN — INSULIN LISPRO 1 UNITS: 100 INJECTION, SOLUTION INTRAVENOUS; SUBCUTANEOUS at 08:13

## 2023-06-24 RX ADMIN — ANTI-FUNGAL POWDER MICONAZOLE NITRATE TALC FREE: 1.42 POWDER TOPICAL at 20:57

## 2023-06-24 RX ADMIN — LATANOPROST 1 DROP: 50 SOLUTION OPHTHALMIC at 20:58

## 2023-06-24 RX ADMIN — IPRATROPIUM BROMIDE AND ALBUTEROL SULFATE 1 DOSE: .5; 2.5 SOLUTION RESPIRATORY (INHALATION) at 09:52

## 2023-06-24 RX ADMIN — INSULIN LISPRO 1 UNITS: 100 INJECTION, SOLUTION INTRAVENOUS; SUBCUTANEOUS at 18:31

## 2023-06-24 RX ADMIN — ACETAMINOPHEN 650 MG: 325 TABLET ORAL at 01:05

## 2023-06-24 RX ADMIN — VANCOMYCIN HYDROCHLORIDE 1500 MG: 10 INJECTION, POWDER, LYOPHILIZED, FOR SOLUTION INTRAVENOUS at 12:54

## 2023-06-24 RX ADMIN — MINERAL SUPPLEMENT IRON 300 MG / 5 ML STRENGTH LIQUID 100 PER BOX UNFLAVORED 300 MG: at 08:35

## 2023-06-24 RX ADMIN — SODIUM CHLORIDE, PRESERVATIVE FREE 10 ML: 5 INJECTION INTRAVENOUS at 08:36

## 2023-06-24 RX ADMIN — HYDROCORTISONE: 25 CREAM TOPICAL at 20:56

## 2023-06-24 ASSESSMENT — PULMONARY FUNCTION TESTS
PIF_VALUE: 24
PIF_VALUE: 27
PIF_VALUE: 25
PIF_VALUE: 24
PIF_VALUE: 25
PIF_VALUE: 23
PIF_VALUE: 26
PIF_VALUE: 28
PIF_VALUE: 29
PIF_VALUE: 25
PIF_VALUE: 24
PIF_VALUE: 29
PIF_VALUE: 23
PIF_VALUE: 25
PIF_VALUE: 26
PIF_VALUE: 25
PIF_VALUE: 35
PIF_VALUE: 23
PIF_VALUE: 25
PIF_VALUE: 26
PIF_VALUE: 25
PIF_VALUE: 25
PIF_VALUE: 22
PIF_VALUE: 24
PIF_VALUE: 27
PIF_VALUE: 26
PIF_VALUE: 26
PIF_VALUE: 23

## 2023-06-24 ASSESSMENT — PAIN SCALES - GENERAL
PAINLEVEL_OUTOF10: 0

## 2023-06-25 LAB
ALBUMIN SERPL-MCNC: 2.6 G/DL (ref 3.5–5.2)
ALP SERPL-CCNC: 90 U/L (ref 35–104)
ALT SERPL-CCNC: 77 U/L (ref 0–32)
ANION GAP SERPL CALCULATED.3IONS-SCNC: 5 MMOL/L (ref 7–16)
AST SERPL-CCNC: 40 U/L (ref 0–31)
BILIRUB SERPL-MCNC: 0.5 MG/DL (ref 0–1.2)
BNP BLD-MCNC: 2047 PG/ML (ref 0–450)
BUN SERPL-MCNC: 27 MG/DL (ref 6–23)
CA-I BLD-SCNC: 1.17 MMOL/L (ref 1.15–1.33)
CALCIUM SERPL-MCNC: 8.3 MG/DL (ref 8.6–10.2)
CHLORIDE SERPL-SCNC: 110 MMOL/L (ref 98–107)
CO2 SERPL-SCNC: 30 MMOL/L (ref 22–29)
CREAT SERPL-MCNC: 0.5 MG/DL (ref 0.5–1)
CRP SERPL HS-MCNC: 4.2 MG/DL (ref 0–0.4)
ERYTHROCYTE [DISTWIDTH] IN BLOOD BY AUTOMATED COUNT: 18.9 FL (ref 11.5–15)
GLUCOSE SERPL-MCNC: 204 MG/DL (ref 74–99)
HCT VFR BLD AUTO: 29.1 % (ref 34–48)
HGB BLD-MCNC: 8.1 G/DL (ref 11.5–15.5)
MCH RBC QN AUTO: 21.4 PG (ref 26–35)
MCHC RBC AUTO-ENTMCNC: 27.8 % (ref 32–34.5)
MCV RBC AUTO: 76.8 FL (ref 80–99.9)
METER GLUCOSE: 185 MG/DL (ref 74–99)
METER GLUCOSE: 188 MG/DL (ref 74–99)
METER GLUCOSE: 208 MG/DL (ref 74–99)
METER GLUCOSE: 237 MG/DL (ref 74–99)
METER GLUCOSE: 244 MG/DL (ref 74–99)
PLATELET # BLD AUTO: 348 E9/L (ref 130–450)
PMV BLD AUTO: 10.8 FL (ref 7–12)
POTASSIUM SERPL-SCNC: 4.3 MMOL/L (ref 3.5–5)
PROCALCITONIN: 0.15 NG/ML (ref 0–0.08)
PROT SERPL-MCNC: 5.3 G/DL (ref 6.4–8.3)
RBC # BLD AUTO: 3.79 E12/L (ref 3.5–5.5)
SODIUM SERPL-SCNC: 145 MMOL/L (ref 132–146)
VANCOMYCIN SERPL-MCNC: 12.4 MCG/ML (ref 5–40)
WBC # BLD: 18.2 E9/L (ref 4.5–11.5)

## 2023-06-25 PROCEDURE — 94003 VENT MGMT INPAT SUBQ DAY: CPT

## 2023-06-25 PROCEDURE — 6370000000 HC RX 637 (ALT 250 FOR IP): Performed by: FAMILY MEDICINE

## 2023-06-25 PROCEDURE — 82330 ASSAY OF CALCIUM: CPT

## 2023-06-25 PROCEDURE — 6370000000 HC RX 637 (ALT 250 FOR IP): Performed by: INTERNAL MEDICINE

## 2023-06-25 PROCEDURE — 80053 COMPREHEN METABOLIC PANEL: CPT

## 2023-06-25 PROCEDURE — 6370000000 HC RX 637 (ALT 250 FOR IP): Performed by: NURSE PRACTITIONER

## 2023-06-25 PROCEDURE — 99291 CRITICAL CARE FIRST HOUR: CPT | Performed by: INTERNAL MEDICINE

## 2023-06-25 PROCEDURE — 2000000000 HC ICU R&B

## 2023-06-25 PROCEDURE — C9113 INJ PANTOPRAZOLE SODIUM, VIA: HCPCS | Performed by: INTERNAL MEDICINE

## 2023-06-25 PROCEDURE — 2580000003 HC RX 258: Performed by: INTERNAL MEDICINE

## 2023-06-25 PROCEDURE — 86140 C-REACTIVE PROTEIN: CPT

## 2023-06-25 PROCEDURE — 2580000003 HC RX 258: Performed by: NURSE PRACTITIONER

## 2023-06-25 PROCEDURE — A4216 STERILE WATER/SALINE, 10 ML: HCPCS | Performed by: INTERNAL MEDICINE

## 2023-06-25 PROCEDURE — 6360000002 HC RX W HCPCS: Performed by: FAMILY MEDICINE

## 2023-06-25 PROCEDURE — 6360000002 HC RX W HCPCS: Performed by: INTERNAL MEDICINE

## 2023-06-25 PROCEDURE — 2500000003 HC RX 250 WO HCPCS: Performed by: STUDENT IN AN ORGANIZED HEALTH CARE EDUCATION/TRAINING PROGRAM

## 2023-06-25 PROCEDURE — 94660 CPAP INITIATION&MGMT: CPT

## 2023-06-25 PROCEDURE — 80202 ASSAY OF VANCOMYCIN: CPT

## 2023-06-25 PROCEDURE — 6360000002 HC RX W HCPCS: Performed by: STUDENT IN AN ORGANIZED HEALTH CARE EDUCATION/TRAINING PROGRAM

## 2023-06-25 PROCEDURE — 82962 GLUCOSE BLOOD TEST: CPT

## 2023-06-25 PROCEDURE — 85027 COMPLETE CBC AUTOMATED: CPT

## 2023-06-25 PROCEDURE — 99232 SBSQ HOSP IP/OBS MODERATE 35: CPT | Performed by: INTERNAL MEDICINE

## 2023-06-25 PROCEDURE — 36592 COLLECT BLOOD FROM PICC: CPT

## 2023-06-25 PROCEDURE — 6360000002 HC RX W HCPCS: Performed by: NURSE PRACTITIONER

## 2023-06-25 PROCEDURE — 83880 ASSAY OF NATRIURETIC PEPTIDE: CPT

## 2023-06-25 PROCEDURE — 2580000003 HC RX 258: Performed by: STUDENT IN AN ORGANIZED HEALTH CARE EDUCATION/TRAINING PROGRAM

## 2023-06-25 PROCEDURE — 2580000003 HC RX 258: Performed by: FAMILY MEDICINE

## 2023-06-25 PROCEDURE — 94640 AIRWAY INHALATION TREATMENT: CPT

## 2023-06-25 PROCEDURE — 84145 PROCALCITONIN (PCT): CPT

## 2023-06-25 RX ORDER — INSULIN LISPRO 100 [IU]/ML
0-4 INJECTION, SOLUTION INTRAVENOUS; SUBCUTANEOUS
Status: DISCONTINUED | OUTPATIENT
Start: 2023-06-25 | End: 2023-06-30 | Stop reason: HOSPADM

## 2023-06-25 RX ADMIN — INSULIN LISPRO 1 UNITS: 100 INJECTION, SOLUTION INTRAVENOUS; SUBCUTANEOUS at 16:49

## 2023-06-25 RX ADMIN — IPRATROPIUM BROMIDE AND ALBUTEROL SULFATE 1 DOSE: .5; 2.5 SOLUTION RESPIRATORY (INHALATION) at 12:54

## 2023-06-25 RX ADMIN — ANTI-FUNGAL POWDER MICONAZOLE NITRATE TALC FREE: 1.42 POWDER TOPICAL at 21:06

## 2023-06-25 RX ADMIN — FENTANYL CITRATE 125 MCG/HR: 0.05 INJECTION, SOLUTION INTRAMUSCULAR; INTRAVENOUS at 05:27

## 2023-06-25 RX ADMIN — SODIUM CHLORIDE, PRESERVATIVE FREE 10 ML: 5 INJECTION INTRAVENOUS at 08:40

## 2023-06-25 RX ADMIN — PIPERACILLIN AND TAZOBACTAM 3375 MG: 3; .375 INJECTION, POWDER, LYOPHILIZED, FOR SOLUTION INTRAVENOUS at 21:25

## 2023-06-25 RX ADMIN — AZITHROMYCIN MONOHYDRATE 500 MG: 500 INJECTION, POWDER, LYOPHILIZED, FOR SOLUTION INTRAVENOUS at 04:42

## 2023-06-25 RX ADMIN — SODIUM CHLORIDE 40 MG: 9 INJECTION INTRAMUSCULAR; INTRAVENOUS; SUBCUTANEOUS at 08:40

## 2023-06-25 RX ADMIN — HYDROCORTISONE: 25 CREAM TOPICAL at 08:39

## 2023-06-25 RX ADMIN — TRIAMCINOLONE ACETONIDE: 1 CREAM TOPICAL at 08:35

## 2023-06-25 RX ADMIN — HYDROCORTISONE SODIUM SUCCINATE 50 MG: 100 INJECTION, POWDER, FOR SOLUTION INTRAMUSCULAR; INTRAVENOUS at 04:37

## 2023-06-25 RX ADMIN — PROPOFOL 20 MCG/KG/MIN: 10 INJECTION, EMULSION INTRAVENOUS at 01:04

## 2023-06-25 RX ADMIN — BUDESONIDE INHALATION 500 MCG: 0.5 SUSPENSION RESPIRATORY (INHALATION) at 17:02

## 2023-06-25 RX ADMIN — INSULIN LISPRO 1 UNITS: 100 INJECTION, SOLUTION INTRAVENOUS; SUBCUTANEOUS at 08:00

## 2023-06-25 RX ADMIN — IPRATROPIUM BROMIDE AND ALBUTEROL SULFATE 1 DOSE: .5; 2.5 SOLUTION RESPIRATORY (INHALATION) at 17:02

## 2023-06-25 RX ADMIN — MINERAL SUPPLEMENT IRON 300 MG / 5 ML STRENGTH LIQUID 100 PER BOX UNFLAVORED 300 MG: at 21:01

## 2023-06-25 RX ADMIN — INSULIN LISPRO 1 UNITS: 100 INJECTION, SOLUTION INTRAVENOUS; SUBCUTANEOUS at 21:02

## 2023-06-25 RX ADMIN — BUDESONIDE INHALATION 500 MCG: 0.5 SUSPENSION RESPIRATORY (INHALATION) at 05:08

## 2023-06-25 RX ADMIN — APIXABAN 10 MG: 5 TABLET, FILM COATED ORAL at 21:02

## 2023-06-25 RX ADMIN — IPRATROPIUM BROMIDE AND ALBUTEROL SULFATE 1 DOSE: .5; 2.5 SOLUTION RESPIRATORY (INHALATION) at 08:35

## 2023-06-25 RX ADMIN — TRIAMCINOLONE ACETONIDE: 1 CREAM TOPICAL at 21:05

## 2023-06-25 RX ADMIN — PIPERACILLIN AND TAZOBACTAM 3375 MG: 3; .375 INJECTION, POWDER, LYOPHILIZED, FOR SOLUTION INTRAVENOUS at 12:42

## 2023-06-25 RX ADMIN — NOREPINEPHRINE BITARTRATE 7 MCG/MIN: 1 INJECTION, SOLUTION, CONCENTRATE INTRAVENOUS at 05:31

## 2023-06-25 RX ADMIN — IPRATROPIUM BROMIDE AND ALBUTEROL SULFATE 1 DOSE: .5; 2.5 SOLUTION RESPIRATORY (INHALATION) at 05:08

## 2023-06-25 RX ADMIN — ENOXAPARIN SODIUM 120 MG: 150 INJECTION SUBCUTANEOUS at 08:39

## 2023-06-25 RX ADMIN — HYDROCORTISONE: 25 CREAM TOPICAL at 21:18

## 2023-06-25 RX ADMIN — PIPERACILLIN AND TAZOBACTAM 3375 MG: 3; .375 INJECTION, POWDER, LYOPHILIZED, FOR SOLUTION INTRAVENOUS at 01:06

## 2023-06-25 RX ADMIN — ANTI-FUNGAL POWDER MICONAZOLE NITRATE TALC FREE: 1.42 POWDER TOPICAL at 08:35

## 2023-06-25 RX ADMIN — LATANOPROST 1 DROP: 50 SOLUTION OPHTHALMIC at 21:19

## 2023-06-25 RX ADMIN — MINERAL SUPPLEMENT IRON 300 MG / 5 ML STRENGTH LIQUID 100 PER BOX UNFLAVORED 300 MG: at 08:39

## 2023-06-25 RX ADMIN — VANCOMYCIN HYDROCHLORIDE 1250 MG: 10 INJECTION, POWDER, LYOPHILIZED, FOR SOLUTION INTRAVENOUS at 12:49

## 2023-06-25 RX ADMIN — SODIUM CHLORIDE, PRESERVATIVE FREE 10 ML: 5 INJECTION INTRAVENOUS at 21:06

## 2023-06-25 RX ADMIN — HYDROCORTISONE SODIUM SUCCINATE 50 MG: 100 INJECTION, POWDER, FOR SOLUTION INTRAMUSCULAR; INTRAVENOUS at 16:46

## 2023-06-25 RX ADMIN — PROPOFOL 20 MCG/KG/MIN: 10 INJECTION, EMULSION INTRAVENOUS at 05:32

## 2023-06-25 ASSESSMENT — PULMONARY FUNCTION TESTS
PIF_VALUE: 24
PIF_VALUE: 14
PIF_VALUE: 23
PIF_VALUE: 24
PIF_VALUE: 23
PIF_VALUE: 14
PIF_VALUE: 23
PIF_VALUE: 23
PIF_VALUE: 22
PIF_VALUE: 24
PIF_VALUE: 14
PIF_VALUE: 23

## 2023-06-25 ASSESSMENT — PAIN SCALES - GENERAL
PAINLEVEL_OUTOF10: 0

## 2023-06-26 LAB
ANION GAP SERPL CALCULATED.3IONS-SCNC: 4 MMOL/L (ref 7–16)
BACTERIA BLD CULT ORG #2: NORMAL
BUN SERPL-MCNC: 23 MG/DL (ref 6–23)
CALCIUM SERPL-MCNC: 8.3 MG/DL (ref 8.6–10.2)
CHLORIDE SERPL-SCNC: 108 MMOL/L (ref 98–107)
CO2 SERPL-SCNC: 31 MMOL/L (ref 22–29)
CREAT SERPL-MCNC: 0.4 MG/DL (ref 0.5–1)
ERYTHROCYTE [DISTWIDTH] IN BLOOD BY AUTOMATED COUNT: 18.8 FL (ref 11.5–15)
GLUCOSE SERPL-MCNC: 120 MG/DL (ref 74–99)
HCT VFR BLD AUTO: 30.2 % (ref 34–48)
HGB BLD-MCNC: 7.8 G/DL (ref 11.5–15.5)
MCH RBC QN AUTO: 20.8 PG (ref 26–35)
MCHC RBC AUTO-ENTMCNC: 25.8 % (ref 32–34.5)
MCV RBC AUTO: 80.5 FL (ref 80–99.9)
METER GLUCOSE: 122 MG/DL (ref 74–99)
METER GLUCOSE: 179 MG/DL (ref 74–99)
METER GLUCOSE: 232 MG/DL (ref 74–99)
METER GLUCOSE: 235 MG/DL (ref 74–99)
PHOSPHATE SERPL-MCNC: 2.5 MG/DL (ref 2.5–4.5)
PLATELET # BLD AUTO: 339 E9/L (ref 130–450)
PMV BLD AUTO: 11 FL (ref 7–12)
POTASSIUM SERPL-SCNC: 4.8 MMOL/L (ref 3.5–5)
PTH-INTACT SERPL-MCNC: 79 PG/ML (ref 15–65)
RBC # BLD AUTO: 3.75 E12/L (ref 3.5–5.5)
SODIUM SERPL-SCNC: 143 MMOL/L (ref 132–146)
VITAMIN D 25-HYDROXY: 12 NG/ML (ref 30–100)
WBC # BLD: 12.4 E9/L (ref 4.5–11.5)

## 2023-06-26 PROCEDURE — 84100 ASSAY OF PHOSPHORUS: CPT

## 2023-06-26 PROCEDURE — A4216 STERILE WATER/SALINE, 10 ML: HCPCS | Performed by: INTERNAL MEDICINE

## 2023-06-26 PROCEDURE — 6360000002 HC RX W HCPCS: Performed by: FAMILY MEDICINE

## 2023-06-26 PROCEDURE — 82962 GLUCOSE BLOOD TEST: CPT

## 2023-06-26 PROCEDURE — 6360000002 HC RX W HCPCS: Performed by: INTERNAL MEDICINE

## 2023-06-26 PROCEDURE — 85027 COMPLETE CBC AUTOMATED: CPT

## 2023-06-26 PROCEDURE — 6370000000 HC RX 637 (ALT 250 FOR IP): Performed by: INTERNAL MEDICINE

## 2023-06-26 PROCEDURE — 2580000003 HC RX 258: Performed by: INTERNAL MEDICINE

## 2023-06-26 PROCEDURE — C9113 INJ PANTOPRAZOLE SODIUM, VIA: HCPCS | Performed by: INTERNAL MEDICINE

## 2023-06-26 PROCEDURE — 94660 CPAP INITIATION&MGMT: CPT

## 2023-06-26 PROCEDURE — 6360000002 HC RX W HCPCS: Performed by: NURSE PRACTITIONER

## 2023-06-26 PROCEDURE — 6370000000 HC RX 637 (ALT 250 FOR IP): Performed by: NURSE PRACTITIONER

## 2023-06-26 PROCEDURE — 99233 SBSQ HOSP IP/OBS HIGH 50: CPT | Performed by: INTERNAL MEDICINE

## 2023-06-26 PROCEDURE — 94640 AIRWAY INHALATION TREATMENT: CPT

## 2023-06-26 PROCEDURE — 2580000003 HC RX 258: Performed by: FAMILY MEDICINE

## 2023-06-26 PROCEDURE — 2580000003 HC RX 258: Performed by: NURSE PRACTITIONER

## 2023-06-26 PROCEDURE — 2000000000 HC ICU R&B

## 2023-06-26 PROCEDURE — 87070 CULTURE OTHR SPECIMN AEROBIC: CPT

## 2023-06-26 PROCEDURE — 99291 CRITICAL CARE FIRST HOUR: CPT | Performed by: INTERNAL MEDICINE

## 2023-06-26 PROCEDURE — 80048 BASIC METABOLIC PNL TOTAL CA: CPT

## 2023-06-26 PROCEDURE — 82306 VITAMIN D 25 HYDROXY: CPT

## 2023-06-26 PROCEDURE — 36415 COLL VENOUS BLD VENIPUNCTURE: CPT

## 2023-06-26 RX ORDER — FERROUS SULFATE 325(65) MG
325 TABLET ORAL 2 TIMES DAILY WITH MEALS
Status: DISCONTINUED | OUTPATIENT
Start: 2023-06-26 | End: 2023-06-30 | Stop reason: HOSPADM

## 2023-06-26 RX ORDER — ERGOCALCIFEROL 1.25 MG/1
50000 CAPSULE ORAL WEEKLY
Status: DISCONTINUED | OUTPATIENT
Start: 2023-06-26 | End: 2023-06-30 | Stop reason: HOSPADM

## 2023-06-26 RX ADMIN — TRIAMCINOLONE ACETONIDE: 1 CREAM TOPICAL at 20:43

## 2023-06-26 RX ADMIN — SODIUM CHLORIDE, PRESERVATIVE FREE 10 ML: 5 INJECTION INTRAVENOUS at 18:24

## 2023-06-26 RX ADMIN — IPRATROPIUM BROMIDE AND ALBUTEROL SULFATE 1 DOSE: .5; 2.5 SOLUTION RESPIRATORY (INHALATION) at 16:07

## 2023-06-26 RX ADMIN — VANCOMYCIN HYDROCHLORIDE 1250 MG: 10 INJECTION, POWDER, LYOPHILIZED, FOR SOLUTION INTRAVENOUS at 01:29

## 2023-06-26 RX ADMIN — MINERAL SUPPLEMENT IRON 300 MG / 5 ML STRENGTH LIQUID 100 PER BOX UNFLAVORED 300 MG: at 09:02

## 2023-06-26 RX ADMIN — HYDROCORTISONE SODIUM SUCCINATE 50 MG: 100 INJECTION, POWDER, FOR SOLUTION INTRAMUSCULAR; INTRAVENOUS at 16:38

## 2023-06-26 RX ADMIN — IPRATROPIUM BROMIDE AND ALBUTEROL SULFATE 1 DOSE: .5; 2.5 SOLUTION RESPIRATORY (INHALATION) at 05:32

## 2023-06-26 RX ADMIN — TRIAMCINOLONE ACETONIDE: 1 CREAM TOPICAL at 09:04

## 2023-06-26 RX ADMIN — AZITHROMYCIN MONOHYDRATE 500 MG: 500 INJECTION, POWDER, LYOPHILIZED, FOR SOLUTION INTRAVENOUS at 03:09

## 2023-06-26 RX ADMIN — SODIUM CHLORIDE, PRESERVATIVE FREE 10 ML: 5 INJECTION INTRAVENOUS at 16:39

## 2023-06-26 RX ADMIN — BUDESONIDE INHALATION 500 MCG: 0.5 SUSPENSION RESPIRATORY (INHALATION) at 16:07

## 2023-06-26 RX ADMIN — IPRATROPIUM BROMIDE AND ALBUTEROL SULFATE 1 DOSE: .5; 2.5 SOLUTION RESPIRATORY (INHALATION) at 08:51

## 2023-06-26 RX ADMIN — INSULIN LISPRO 1 UNITS: 100 INJECTION, SOLUTION INTRAVENOUS; SUBCUTANEOUS at 12:08

## 2023-06-26 RX ADMIN — FERROUS SULFATE TAB 325 MG (65 MG ELEMENTAL FE) 325 MG: 325 (65 FE) TAB at 16:39

## 2023-06-26 RX ADMIN — PIPERACILLIN AND TAZOBACTAM 3375 MG: 3; .375 INJECTION, POWDER, LYOPHILIZED, FOR SOLUTION INTRAVENOUS at 05:09

## 2023-06-26 RX ADMIN — HYDROCORTISONE: 25 CREAM TOPICAL at 20:43

## 2023-06-26 RX ADMIN — SODIUM CHLORIDE 40 MG: 9 INJECTION INTRAMUSCULAR; INTRAVENOUS; SUBCUTANEOUS at 09:03

## 2023-06-26 RX ADMIN — BUDESONIDE INHALATION 500 MCG: 0.5 SUSPENSION RESPIRATORY (INHALATION) at 05:32

## 2023-06-26 RX ADMIN — PIPERACILLIN AND TAZOBACTAM 3375 MG: 3; .375 INJECTION, POWDER, LYOPHILIZED, FOR SOLUTION INTRAVENOUS at 13:16

## 2023-06-26 RX ADMIN — INSULIN LISPRO 1 UNITS: 100 INJECTION, SOLUTION INTRAVENOUS; SUBCUTANEOUS at 16:34

## 2023-06-26 RX ADMIN — HYDROCORTISONE SODIUM SUCCINATE 50 MG: 100 INJECTION, POWDER, FOR SOLUTION INTRAMUSCULAR; INTRAVENOUS at 04:47

## 2023-06-26 RX ADMIN — ERGOCALCIFEROL 50000 UNITS: 1.25 CAPSULE ORAL at 13:18

## 2023-06-26 RX ADMIN — APIXABAN 10 MG: 5 TABLET, FILM COATED ORAL at 09:01

## 2023-06-26 RX ADMIN — ANTI-FUNGAL POWDER MICONAZOLE NITRATE TALC FREE: 1.42 POWDER TOPICAL at 20:44

## 2023-06-26 RX ADMIN — VANCOMYCIN HYDROCHLORIDE 1250 MG: 10 INJECTION, POWDER, LYOPHILIZED, FOR SOLUTION INTRAVENOUS at 13:12

## 2023-06-26 RX ADMIN — HYDROCORTISONE: 25 CREAM TOPICAL at 09:02

## 2023-06-26 RX ADMIN — SODIUM CHLORIDE, PRESERVATIVE FREE 10 ML: 5 INJECTION INTRAVENOUS at 09:04

## 2023-06-26 RX ADMIN — APIXABAN 10 MG: 5 TABLET, FILM COATED ORAL at 20:50

## 2023-06-26 RX ADMIN — LATANOPROST 1 DROP: 50 SOLUTION OPHTHALMIC at 20:46

## 2023-06-26 RX ADMIN — SODIUM CHLORIDE, PRESERVATIVE FREE 10 ML: 5 INJECTION INTRAVENOUS at 15:27

## 2023-06-26 RX ADMIN — PIPERACILLIN AND TAZOBACTAM 3375 MG: 3; .375 INJECTION, POWDER, LYOPHILIZED, FOR SOLUTION INTRAVENOUS at 21:01

## 2023-06-26 RX ADMIN — ANTI-FUNGAL POWDER MICONAZOLE NITRATE TALC FREE: 1.42 POWDER TOPICAL at 09:03

## 2023-06-27 ENCOUNTER — APPOINTMENT (OUTPATIENT)
Dept: GENERAL RADIOLOGY | Age: 83
End: 2023-06-27
Payer: MEDICARE

## 2023-06-27 LAB
ANION GAP SERPL CALCULATED.3IONS-SCNC: 3 MMOL/L (ref 7–16)
BACTERIA BLD CULT: ABNORMAL
BUN SERPL-MCNC: 19 MG/DL (ref 6–23)
CALCIUM SERPL-MCNC: 8.5 MG/DL (ref 8.6–10.2)
CHLORIDE SERPL-SCNC: 105 MMOL/L (ref 98–107)
CO2 SERPL-SCNC: 31 MMOL/L (ref 22–29)
CREAT SERPL-MCNC: 0.4 MG/DL (ref 0.5–1)
ERYTHROCYTE [DISTWIDTH] IN BLOOD BY AUTOMATED COUNT: 18.6 FL (ref 11.5–15)
GLUCOSE SERPL-MCNC: 125 MG/DL (ref 74–99)
HCT VFR BLD AUTO: 29.5 % (ref 34–48)
HGB BLD-MCNC: 7.9 G/DL (ref 11.5–15.5)
MAGNESIUM SERPL-MCNC: 1.8 MG/DL (ref 1.6–2.6)
MCH RBC QN AUTO: 21.8 PG (ref 26–35)
MCHC RBC AUTO-ENTMCNC: 26.8 % (ref 32–34.5)
MCV RBC AUTO: 81.3 FL (ref 80–99.9)
METER GLUCOSE: 134 MG/DL (ref 74–99)
METER GLUCOSE: 151 MG/DL (ref 74–99)
METER GLUCOSE: 154 MG/DL (ref 74–99)
METER GLUCOSE: 180 MG/DL (ref 74–99)
ORGANISM: ABNORMAL
PHOSPHATE SERPL-MCNC: 2.8 MG/DL (ref 2.5–4.5)
PLATELET # BLD AUTO: 348 E9/L (ref 130–450)
PMV BLD AUTO: 10.7 FL (ref 7–12)
POTASSIUM SERPL-SCNC: 5 MMOL/L (ref 3.5–5)
RBC # BLD AUTO: 3.63 E12/L (ref 3.5–5.5)
SODIUM SERPL-SCNC: 139 MMOL/L (ref 132–146)
VANCOMYCIN TROUGH SERPL-MCNC: 18.5 MCG/ML (ref 5–16)
WBC # BLD: 11.8 E9/L (ref 4.5–11.5)

## 2023-06-27 PROCEDURE — 97161 PT EVAL LOW COMPLEX 20 MIN: CPT

## 2023-06-27 PROCEDURE — 6360000002 HC RX W HCPCS: Performed by: NURSE PRACTITIONER

## 2023-06-27 PROCEDURE — 2580000003 HC RX 258: Performed by: INTERNAL MEDICINE

## 2023-06-27 PROCEDURE — 2580000003 HC RX 258: Performed by: NURSE PRACTITIONER

## 2023-06-27 PROCEDURE — C1751 CATH, INF, PER/CENT/MIDLINE: HCPCS

## 2023-06-27 PROCEDURE — 84100 ASSAY OF PHOSPHORUS: CPT

## 2023-06-27 PROCEDURE — 80048 BASIC METABOLIC PNL TOTAL CA: CPT

## 2023-06-27 PROCEDURE — 36415 COLL VENOUS BLD VENIPUNCTURE: CPT

## 2023-06-27 PROCEDURE — 83735 ASSAY OF MAGNESIUM: CPT

## 2023-06-27 PROCEDURE — 02HV33Z INSERTION OF INFUSION DEVICE INTO SUPERIOR VENA CAVA, PERCUTANEOUS APPROACH: ICD-10-PCS | Performed by: INTERNAL MEDICINE

## 2023-06-27 PROCEDURE — 6360000002 HC RX W HCPCS: Performed by: FAMILY MEDICINE

## 2023-06-27 PROCEDURE — 80202 ASSAY OF VANCOMYCIN: CPT

## 2023-06-27 PROCEDURE — 94660 CPAP INITIATION&MGMT: CPT

## 2023-06-27 PROCEDURE — 2720000010 HC SURG SUPPLY STERILE

## 2023-06-27 PROCEDURE — 71045 X-RAY EXAM CHEST 1 VIEW: CPT

## 2023-06-27 PROCEDURE — 6370000000 HC RX 637 (ALT 250 FOR IP): Performed by: INTERNAL MEDICINE

## 2023-06-27 PROCEDURE — 99291 CRITICAL CARE FIRST HOUR: CPT | Performed by: INTERNAL MEDICINE

## 2023-06-27 PROCEDURE — 76937 US GUIDE VASCULAR ACCESS: CPT

## 2023-06-27 PROCEDURE — 6360000002 HC RX W HCPCS: Performed by: INTERNAL MEDICINE

## 2023-06-27 PROCEDURE — 82962 GLUCOSE BLOOD TEST: CPT

## 2023-06-27 PROCEDURE — 2580000003 HC RX 258: Performed by: FAMILY MEDICINE

## 2023-06-27 PROCEDURE — 1200000000 HC SEMI PRIVATE

## 2023-06-27 PROCEDURE — 94640 AIRWAY INHALATION TREATMENT: CPT

## 2023-06-27 PROCEDURE — 85027 COMPLETE CBC AUTOMATED: CPT

## 2023-06-27 PROCEDURE — 99232 SBSQ HOSP IP/OBS MODERATE 35: CPT | Performed by: INTERNAL MEDICINE

## 2023-06-27 PROCEDURE — 2700000000 HC OXYGEN THERAPY PER DAY

## 2023-06-27 PROCEDURE — 36569 INSJ PICC 5 YR+ W/O IMAGING: CPT

## 2023-06-27 RX ORDER — LIDOCAINE HYDROCHLORIDE 10 MG/ML
5 INJECTION, SOLUTION EPIDURAL; INFILTRATION; INTRACAUDAL; PERINEURAL ONCE
Status: DISCONTINUED | OUTPATIENT
Start: 2023-06-27 | End: 2023-06-30 | Stop reason: HOSPADM

## 2023-06-27 RX ORDER — SODIUM CHLORIDE 0.9 % (FLUSH) 0.9 %
5-40 SYRINGE (ML) INJECTION EVERY 12 HOURS SCHEDULED
Status: DISCONTINUED | OUTPATIENT
Start: 2023-06-27 | End: 2023-06-30 | Stop reason: HOSPADM

## 2023-06-27 RX ORDER — HEPARIN SODIUM 100 [USP'U]/ML
1 INJECTION, SOLUTION INTRAVENOUS EVERY 12 HOURS SCHEDULED
Status: DISCONTINUED | OUTPATIENT
Start: 2023-06-27 | End: 2023-06-30 | Stop reason: HOSPADM

## 2023-06-27 RX ORDER — HEPARIN SODIUM 100 [USP'U]/ML
3 INJECTION, SOLUTION INTRAVENOUS EVERY 12 HOURS SCHEDULED
Status: DISCONTINUED | OUTPATIENT
Start: 2023-06-27 | End: 2023-06-30 | Stop reason: HOSPADM

## 2023-06-27 RX ORDER — SODIUM CHLORIDE 0.9 % (FLUSH) 0.9 %
5-40 SYRINGE (ML) INJECTION PRN
Status: DISCONTINUED | OUTPATIENT
Start: 2023-06-27 | End: 2023-06-30 | Stop reason: HOSPADM

## 2023-06-27 RX ORDER — HEPARIN SODIUM 100 [USP'U]/ML
1 INJECTION, SOLUTION INTRAVENOUS PRN
Status: DISCONTINUED | OUTPATIENT
Start: 2023-06-27 | End: 2023-06-30 | Stop reason: HOSPADM

## 2023-06-27 RX ORDER — HEPARIN SODIUM 100 [USP'U]/ML
3 INJECTION, SOLUTION INTRAVENOUS PRN
Status: DISCONTINUED | OUTPATIENT
Start: 2023-06-27 | End: 2023-06-30 | Stop reason: HOSPADM

## 2023-06-27 RX ORDER — MAGNESIUM SULFATE IN WATER 40 MG/ML
2000 INJECTION, SOLUTION INTRAVENOUS ONCE
Status: COMPLETED | OUTPATIENT
Start: 2023-06-27 | End: 2023-06-27

## 2023-06-27 RX ORDER — SODIUM CHLORIDE 9 MG/ML
INJECTION, SOLUTION INTRAVENOUS PRN
Status: DISCONTINUED | OUTPATIENT
Start: 2023-06-27 | End: 2023-06-30 | Stop reason: HOSPADM

## 2023-06-27 RX ADMIN — FERROUS SULFATE TAB 325 MG (65 MG ELEMENTAL FE) 325 MG: 325 (65 FE) TAB at 07:51

## 2023-06-27 RX ADMIN — ANTI-FUNGAL POWDER MICONAZOLE NITRATE TALC FREE: 1.42 POWDER TOPICAL at 20:56

## 2023-06-27 RX ADMIN — BUDESONIDE INHALATION 500 MCG: 0.5 SUSPENSION RESPIRATORY (INHALATION) at 05:24

## 2023-06-27 RX ADMIN — TRIAMCINOLONE ACETONIDE: 1 CREAM TOPICAL at 09:10

## 2023-06-27 RX ADMIN — TRIAMCINOLONE ACETONIDE: 1 CREAM TOPICAL at 20:56

## 2023-06-27 RX ADMIN — VANCOMYCIN HYDROCHLORIDE 1250 MG: 10 INJECTION, POWDER, LYOPHILIZED, FOR SOLUTION INTRAVENOUS at 13:37

## 2023-06-27 RX ADMIN — ANTI-FUNGAL POWDER MICONAZOLE NITRATE TALC FREE: 1.42 POWDER TOPICAL at 09:10

## 2023-06-27 RX ADMIN — LATANOPROST 1 DROP: 50 SOLUTION OPHTHALMIC at 20:57

## 2023-06-27 RX ADMIN — FERROUS SULFATE TAB 325 MG (65 MG ELEMENTAL FE) 325 MG: 325 (65 FE) TAB at 16:42

## 2023-06-27 RX ADMIN — IPRATROPIUM BROMIDE AND ALBUTEROL SULFATE 1 DOSE: .5; 2.5 SOLUTION RESPIRATORY (INHALATION) at 09:43

## 2023-06-27 RX ADMIN — VANCOMYCIN HYDROCHLORIDE 1250 MG: 10 INJECTION, POWDER, LYOPHILIZED, FOR SOLUTION INTRAVENOUS at 01:45

## 2023-06-27 RX ADMIN — SODIUM CHLORIDE, PRESERVATIVE FREE 10 ML: 5 INJECTION INTRAVENOUS at 20:52

## 2023-06-27 RX ADMIN — IPRATROPIUM BROMIDE AND ALBUTEROL SULFATE 1 DOSE: .5; 2.5 SOLUTION RESPIRATORY (INHALATION) at 05:24

## 2023-06-27 RX ADMIN — APIXABAN 10 MG: 5 TABLET, FILM COATED ORAL at 20:54

## 2023-06-27 RX ADMIN — BUDESONIDE INHALATION 500 MCG: 0.5 SUSPENSION RESPIRATORY (INHALATION) at 17:27

## 2023-06-27 RX ADMIN — HYDROCORTISONE: 25 CREAM TOPICAL at 20:56

## 2023-06-27 RX ADMIN — IPRATROPIUM BROMIDE AND ALBUTEROL SULFATE 1 DOSE: .5; 2.5 SOLUTION RESPIRATORY (INHALATION) at 17:27

## 2023-06-27 RX ADMIN — IPRATROPIUM BROMIDE AND ALBUTEROL SULFATE 1 DOSE: .5; 2.5 SOLUTION RESPIRATORY (INHALATION) at 13:57

## 2023-06-27 RX ADMIN — MAGNESIUM SULFATE HEPTAHYDRATE 2000 MG: 40 INJECTION, SOLUTION INTRAVENOUS at 12:44

## 2023-06-27 RX ADMIN — SODIUM CHLORIDE, PRESERVATIVE FREE 10 ML: 5 INJECTION INTRAVENOUS at 20:53

## 2023-06-27 RX ADMIN — PIPERACILLIN AND TAZOBACTAM 3375 MG: 3; .375 INJECTION, POWDER, LYOPHILIZED, FOR SOLUTION INTRAVENOUS at 20:52

## 2023-06-27 RX ADMIN — HYDROCORTISONE: 25 CREAM TOPICAL at 09:09

## 2023-06-27 RX ADMIN — APIXABAN 10 MG: 5 TABLET, FILM COATED ORAL at 09:11

## 2023-06-27 RX ADMIN — PIPERACILLIN AND TAZOBACTAM 3375 MG: 3; .375 INJECTION, POWDER, LYOPHILIZED, FOR SOLUTION INTRAVENOUS at 13:17

## 2023-06-27 ASSESSMENT — PAIN SCALES - GENERAL: PAINLEVEL_OUTOF10: 0

## 2023-06-28 LAB
ANION GAP SERPL CALCULATED.3IONS-SCNC: 3 MMOL/L (ref 7–16)
BUN SERPL-MCNC: 15 MG/DL (ref 6–23)
CALCIUM SERPL-MCNC: 8.3 MG/DL (ref 8.6–10.2)
CHLORIDE SERPL-SCNC: 101 MMOL/L (ref 98–107)
CO2 SERPL-SCNC: 34 MMOL/L (ref 22–29)
CREAT SERPL-MCNC: 0.4 MG/DL (ref 0.5–1)
ERYTHROCYTE [DISTWIDTH] IN BLOOD BY AUTOMATED COUNT: 19 FL (ref 11.5–15)
GLUCOSE SERPL-MCNC: 126 MG/DL (ref 74–99)
HCT VFR BLD AUTO: 28.5 % (ref 34–48)
HGB BLD-MCNC: 7.7 G/DL (ref 11.5–15.5)
MAGNESIUM SERPL-MCNC: 2 MG/DL (ref 1.6–2.6)
MCH RBC QN AUTO: 21.8 PG (ref 26–35)
MCHC RBC AUTO-ENTMCNC: 27 % (ref 32–34.5)
MCV RBC AUTO: 80.5 FL (ref 80–99.9)
METER GLUCOSE: 105 MG/DL (ref 74–99)
METER GLUCOSE: 127 MG/DL (ref 74–99)
METER GLUCOSE: 136 MG/DL (ref 74–99)
METER GLUCOSE: 199 MG/DL (ref 74–99)
PHOSPHATE SERPL-MCNC: 2.5 MG/DL (ref 2.5–4.5)
PLATELET # BLD AUTO: 350 E9/L (ref 130–450)
PMV BLD AUTO: 10.7 FL (ref 7–12)
POTASSIUM SERPL-SCNC: 4.5 MMOL/L (ref 3.5–5)
RBC # BLD AUTO: 3.54 E12/L (ref 3.5–5.5)
SODIUM SERPL-SCNC: 138 MMOL/L (ref 132–146)
WBC # BLD: 13 E9/L (ref 4.5–11.5)

## 2023-06-28 PROCEDURE — 6370000000 HC RX 637 (ALT 250 FOR IP): Performed by: INTERNAL MEDICINE

## 2023-06-28 PROCEDURE — 6360000002 HC RX W HCPCS: Performed by: NURSE PRACTITIONER

## 2023-06-28 PROCEDURE — 99291 CRITICAL CARE FIRST HOUR: CPT | Performed by: INTERNAL MEDICINE

## 2023-06-28 PROCEDURE — 2580000003 HC RX 258: Performed by: FAMILY MEDICINE

## 2023-06-28 PROCEDURE — 99232 SBSQ HOSP IP/OBS MODERATE 35: CPT | Performed by: INTERNAL MEDICINE

## 2023-06-28 PROCEDURE — 2580000003 HC RX 258: Performed by: NURSE PRACTITIONER

## 2023-06-28 PROCEDURE — 84100 ASSAY OF PHOSPHORUS: CPT

## 2023-06-28 PROCEDURE — 82962 GLUCOSE BLOOD TEST: CPT

## 2023-06-28 PROCEDURE — 97165 OT EVAL LOW COMPLEX 30 MIN: CPT | Performed by: OCCUPATIONAL THERAPIST

## 2023-06-28 PROCEDURE — 94640 AIRWAY INHALATION TREATMENT: CPT

## 2023-06-28 PROCEDURE — 6360000002 HC RX W HCPCS: Performed by: FAMILY MEDICINE

## 2023-06-28 PROCEDURE — 1200000000 HC SEMI PRIVATE

## 2023-06-28 PROCEDURE — 6370000000 HC RX 637 (ALT 250 FOR IP): Performed by: FAMILY MEDICINE

## 2023-06-28 PROCEDURE — 94660 CPAP INITIATION&MGMT: CPT

## 2023-06-28 PROCEDURE — 36592 COLLECT BLOOD FROM PICC: CPT

## 2023-06-28 PROCEDURE — 2580000003 HC RX 258: Performed by: INTERNAL MEDICINE

## 2023-06-28 PROCEDURE — 6360000002 HC RX W HCPCS: Performed by: INTERNAL MEDICINE

## 2023-06-28 PROCEDURE — 97530 THERAPEUTIC ACTIVITIES: CPT | Performed by: OCCUPATIONAL THERAPIST

## 2023-06-28 PROCEDURE — 80048 BASIC METABOLIC PNL TOTAL CA: CPT

## 2023-06-28 PROCEDURE — 85027 COMPLETE CBC AUTOMATED: CPT

## 2023-06-28 PROCEDURE — 2700000000 HC OXYGEN THERAPY PER DAY

## 2023-06-28 PROCEDURE — 83735 ASSAY OF MAGNESIUM: CPT

## 2023-06-28 RX ADMIN — ACETAMINOPHEN 650 MG: 325 TABLET ORAL at 08:28

## 2023-06-28 RX ADMIN — TRIAMCINOLONE ACETONIDE: 1 CREAM TOPICAL at 08:32

## 2023-06-28 RX ADMIN — VANCOMYCIN HYDROCHLORIDE 1250 MG: 10 INJECTION, POWDER, LYOPHILIZED, FOR SOLUTION INTRAVENOUS at 14:00

## 2023-06-28 RX ADMIN — BUDESONIDE INHALATION 500 MCG: 0.5 SUSPENSION RESPIRATORY (INHALATION) at 17:26

## 2023-06-28 RX ADMIN — HEPARIN 100 UNITS: 100 SYRINGE at 21:20

## 2023-06-28 RX ADMIN — FERROUS SULFATE TAB 325 MG (65 MG ELEMENTAL FE) 325 MG: 325 (65 FE) TAB at 08:29

## 2023-06-28 RX ADMIN — BUDESONIDE INHALATION 500 MCG: 0.5 SUSPENSION RESPIRATORY (INHALATION) at 06:23

## 2023-06-28 RX ADMIN — IPRATROPIUM BROMIDE AND ALBUTEROL SULFATE 1 DOSE: .5; 2.5 SOLUTION RESPIRATORY (INHALATION) at 10:24

## 2023-06-28 RX ADMIN — HEPARIN 100 UNITS: 100 SYRINGE at 08:29

## 2023-06-28 RX ADMIN — PIPERACILLIN AND TAZOBACTAM 3375 MG: 3; .375 INJECTION, POWDER, LYOPHILIZED, FOR SOLUTION INTRAVENOUS at 13:59

## 2023-06-28 RX ADMIN — APIXABAN 10 MG: 5 TABLET, FILM COATED ORAL at 08:29

## 2023-06-28 RX ADMIN — IPRATROPIUM BROMIDE AND ALBUTEROL SULFATE 1 DOSE: .5; 2.5 SOLUTION RESPIRATORY (INHALATION) at 17:26

## 2023-06-28 RX ADMIN — PIPERACILLIN AND TAZOBACTAM 3375 MG: 3; .375 INJECTION, POWDER, LYOPHILIZED, FOR SOLUTION INTRAVENOUS at 04:43

## 2023-06-28 RX ADMIN — PIPERACILLIN AND TAZOBACTAM 3375 MG: 3; .375 INJECTION, POWDER, LYOPHILIZED, FOR SOLUTION INTRAVENOUS at 21:40

## 2023-06-28 RX ADMIN — APIXABAN 10 MG: 5 TABLET, FILM COATED ORAL at 21:18

## 2023-06-28 RX ADMIN — VANCOMYCIN HYDROCHLORIDE 1250 MG: 10 INJECTION, POWDER, LYOPHILIZED, FOR SOLUTION INTRAVENOUS at 01:15

## 2023-06-28 RX ADMIN — ANTI-FUNGAL POWDER MICONAZOLE NITRATE TALC FREE: 1.42 POWDER TOPICAL at 08:33

## 2023-06-28 RX ADMIN — FERROUS SULFATE TAB 325 MG (65 MG ELEMENTAL FE) 325 MG: 325 (65 FE) TAB at 21:19

## 2023-06-28 RX ADMIN — SODIUM CHLORIDE, PRESERVATIVE FREE 10 ML: 5 INJECTION INTRAVENOUS at 22:11

## 2023-06-28 RX ADMIN — HEPARIN 100 UNITS: 100 SYRINGE at 22:10

## 2023-06-28 RX ADMIN — SODIUM CHLORIDE, PRESERVATIVE FREE 10 ML: 5 INJECTION INTRAVENOUS at 21:20

## 2023-06-28 RX ADMIN — HYDROCORTISONE: 25 CREAM TOPICAL at 08:33

## 2023-06-28 RX ADMIN — IPRATROPIUM BROMIDE AND ALBUTEROL SULFATE 1 DOSE: .5; 2.5 SOLUTION RESPIRATORY (INHALATION) at 06:23

## 2023-06-28 RX ADMIN — ANTI-FUNGAL POWDER MICONAZOLE NITRATE TALC FREE: 1.42 POWDER TOPICAL at 21:20

## 2023-06-28 ASSESSMENT — PAIN SCALES - GENERAL
PAINLEVEL_OUTOF10: 0
PAINLEVEL_OUTOF10: 3
PAINLEVEL_OUTOF10: 0

## 2023-06-28 ASSESSMENT — PAIN DESCRIPTION - ORIENTATION: ORIENTATION: MID

## 2023-06-28 ASSESSMENT — PAIN DESCRIPTION - DESCRIPTORS: DESCRIPTORS: ACHING

## 2023-06-28 ASSESSMENT — PAIN DESCRIPTION - LOCATION: LOCATION: BACK

## 2023-06-29 VITALS
BODY MASS INDEX: 41.99 KG/M2 | HEIGHT: 69 IN | HEART RATE: 90 BPM | OXYGEN SATURATION: 99 % | SYSTOLIC BLOOD PRESSURE: 116 MMHG | WEIGHT: 283.5 LBS | DIASTOLIC BLOOD PRESSURE: 60 MMHG | TEMPERATURE: 98 F | RESPIRATION RATE: 18 BRPM

## 2023-06-29 LAB
B.E.: 12.5 MMOL/L (ref -3–3)
BACTERIA BLD CULT ORG #2: NORMAL
BACTERIA BLD CULT: NORMAL
BACTERIA CATH TIP CULT: NORMAL
COHB: 0.6 % (ref 0–1.5)
CRITICAL: ABNORMAL
DATE ANALYZED: ABNORMAL
DATE OF COLLECTION: ABNORMAL
HCO3: 38.4 MMOL/L (ref 22–26)
HHB: 13.1 % (ref 0–5)
LAB: ABNORMAL
Lab: ABNORMAL
MAGNESIUM SERPL-MCNC: 2 MG/DL (ref 1.6–2.6)
METER GLUCOSE: 116 MG/DL (ref 74–99)
METER GLUCOSE: 148 MG/DL (ref 74–99)
METER GLUCOSE: 173 MG/DL (ref 74–99)
METHB: 0.3 % (ref 0–1.5)
MODE: ABNORMAL
O2 CONTENT: 10.2 ML/DL
O2 SATURATION: 86.8 % (ref 92–98.5)
O2HB: 86 % (ref 94–97)
OPERATOR ID: 8214
PATIENT TEMP: 37 C
PCO2: 59.4 MMHG (ref 35–45)
PH BLOOD GAS: 7.43 (ref 7.35–7.45)
PO2: 49.6 MMHG (ref 75–100)
SOURCE, BLOOD GAS: ABNORMAL
THB: 8.4 G/DL (ref 11.5–16.5)
TIME ANALYZED: 1457

## 2023-06-29 PROCEDURE — 99239 HOSP IP/OBS DSCHRG MGMT >30: CPT | Performed by: INTERNAL MEDICINE

## 2023-06-29 PROCEDURE — 6370000000 HC RX 637 (ALT 250 FOR IP): Performed by: INTERNAL MEDICINE

## 2023-06-29 PROCEDURE — 36600 WITHDRAWAL OF ARTERIAL BLOOD: CPT

## 2023-06-29 PROCEDURE — 6360000002 HC RX W HCPCS: Performed by: INTERNAL MEDICINE

## 2023-06-29 PROCEDURE — 6360000002 HC RX W HCPCS: Performed by: NURSE PRACTITIONER

## 2023-06-29 PROCEDURE — 94660 CPAP INITIATION&MGMT: CPT

## 2023-06-29 PROCEDURE — 6370000000 HC RX 637 (ALT 250 FOR IP): Performed by: FAMILY MEDICINE

## 2023-06-29 PROCEDURE — 2580000003 HC RX 258: Performed by: NURSE PRACTITIONER

## 2023-06-29 PROCEDURE — 82962 GLUCOSE BLOOD TEST: CPT

## 2023-06-29 PROCEDURE — 2700000000 HC OXYGEN THERAPY PER DAY

## 2023-06-29 PROCEDURE — 94640 AIRWAY INHALATION TREATMENT: CPT

## 2023-06-29 PROCEDURE — 1200000000 HC SEMI PRIVATE

## 2023-06-29 PROCEDURE — 36415 COLL VENOUS BLD VENIPUNCTURE: CPT

## 2023-06-29 PROCEDURE — 2580000003 HC RX 258: Performed by: FAMILY MEDICINE

## 2023-06-29 PROCEDURE — 82805 BLOOD GASES W/O2 SATURATION: CPT

## 2023-06-29 PROCEDURE — 97110 THERAPEUTIC EXERCISES: CPT

## 2023-06-29 PROCEDURE — 6360000002 HC RX W HCPCS: Performed by: FAMILY MEDICINE

## 2023-06-29 PROCEDURE — 36591 DRAW BLOOD OFF VENOUS DEVICE: CPT

## 2023-06-29 PROCEDURE — 83735 ASSAY OF MAGNESIUM: CPT

## 2023-06-29 PROCEDURE — 6370000000 HC RX 637 (ALT 250 FOR IP): Performed by: NURSE PRACTITIONER

## 2023-06-29 RX ORDER — POLYETHYLENE GLYCOL 3350 17 G/17G
17 POWDER, FOR SOLUTION ORAL DAILY
Qty: 527 G | Refills: 0 | DISCHARGE
Start: 2023-06-30 | End: 2023-07-31

## 2023-06-29 RX ORDER — BUDESONIDE 0.5 MG/2ML
0.5 INHALANT ORAL 2 TIMES DAILY
Qty: 60 EACH | Refills: 3 | DISCHARGE
Start: 2023-06-29

## 2023-06-29 RX ORDER — IPRATROPIUM BROMIDE AND ALBUTEROL SULFATE 2.5; .5 MG/3ML; MG/3ML
3 SOLUTION RESPIRATORY (INHALATION) 4 TIMES DAILY
Qty: 360 ML | DISCHARGE
Start: 2023-06-29

## 2023-06-29 RX ORDER — LORAZEPAM 2 MG/ML
1 INJECTION INTRAMUSCULAR ONCE
Status: DISCONTINUED | OUTPATIENT
Start: 2023-06-29 | End: 2023-06-29

## 2023-06-29 RX ORDER — ERGOCALCIFEROL 1.25 MG/1
50000 CAPSULE ORAL WEEKLY
Qty: 5 CAPSULE | DISCHARGE
Start: 2023-07-03

## 2023-06-29 RX ORDER — INSULIN LISPRO 100 [IU]/ML
0-4 INJECTION, SOLUTION INTRAVENOUS; SUBCUTANEOUS
DISCHARGE
Start: 2023-06-29

## 2023-06-29 RX ORDER — BENZONATATE 100 MG/1
100 CAPSULE ORAL 3 TIMES DAILY PRN
Status: DISCONTINUED | OUTPATIENT
Start: 2023-06-29 | End: 2023-06-30 | Stop reason: HOSPADM

## 2023-06-29 RX ORDER — BENZONATATE 100 MG/1
100 CAPSULE ORAL 3 TIMES DAILY PRN
DISCHARGE
Start: 2023-06-29 | End: 2023-07-06

## 2023-06-29 RX ORDER — FERROUS SULFATE 325(65) MG
325 TABLET ORAL 2 TIMES DAILY WITH MEALS
Qty: 30 TABLET | Refills: 3 | DISCHARGE
Start: 2023-06-29

## 2023-06-29 RX ADMIN — IPRATROPIUM BROMIDE AND ALBUTEROL SULFATE 1 DOSE: .5; 2.5 SOLUTION RESPIRATORY (INHALATION) at 10:13

## 2023-06-29 RX ADMIN — VANCOMYCIN HYDROCHLORIDE 1250 MG: 10 INJECTION, POWDER, LYOPHILIZED, FOR SOLUTION INTRAVENOUS at 13:41

## 2023-06-29 RX ADMIN — APIXABAN 10 MG: 5 TABLET, FILM COATED ORAL at 09:51

## 2023-06-29 RX ADMIN — BENZONATATE 100 MG: 100 CAPSULE ORAL at 15:41

## 2023-06-29 RX ADMIN — POLYETHYLENE GLYCOL 3350 17 G: 17 POWDER, FOR SOLUTION ORAL at 09:52

## 2023-06-29 RX ADMIN — PIPERACILLIN AND TAZOBACTAM 3375 MG: 3; .375 INJECTION, POWDER, LYOPHILIZED, FOR SOLUTION INTRAVENOUS at 05:35

## 2023-06-29 RX ADMIN — FERROUS SULFATE TAB 325 MG (65 MG ELEMENTAL FE) 325 MG: 325 (65 FE) TAB at 17:45

## 2023-06-29 RX ADMIN — HYDROCORTISONE: 25 CREAM TOPICAL at 09:51

## 2023-06-29 RX ADMIN — BUDESONIDE INHALATION 500 MCG: 0.5 SUSPENSION RESPIRATORY (INHALATION) at 06:24

## 2023-06-29 RX ADMIN — IPRATROPIUM BROMIDE AND ALBUTEROL SULFATE 1 DOSE: .5; 2.5 SOLUTION RESPIRATORY (INHALATION) at 14:31

## 2023-06-29 RX ADMIN — HEPARIN 100 UNITS: 100 SYRINGE at 09:52

## 2023-06-29 RX ADMIN — IPRATROPIUM BROMIDE AND ALBUTEROL SULFATE 1 DOSE: .5; 2.5 SOLUTION RESPIRATORY (INHALATION) at 06:24

## 2023-06-29 RX ADMIN — VANCOMYCIN HYDROCHLORIDE 1250 MG: 10 INJECTION, POWDER, LYOPHILIZED, FOR SOLUTION INTRAVENOUS at 01:32

## 2023-06-29 RX ADMIN — ANTI-FUNGAL POWDER MICONAZOLE NITRATE TALC FREE: 1.42 POWDER TOPICAL at 09:53

## 2023-06-29 RX ADMIN — BUDESONIDE INHALATION 500 MCG: 0.5 SUSPENSION RESPIRATORY (INHALATION) at 17:24

## 2023-06-29 RX ADMIN — IPRATROPIUM BROMIDE AND ALBUTEROL SULFATE 1 DOSE: .5; 2.5 SOLUTION RESPIRATORY (INHALATION) at 17:23

## 2023-06-29 RX ADMIN — ACETAMINOPHEN 650 MG: 325 TABLET ORAL at 03:14

## 2023-06-29 RX ADMIN — FERROUS SULFATE TAB 325 MG (65 MG ELEMENTAL FE) 325 MG: 325 (65 FE) TAB at 09:51

## 2023-06-29 RX ADMIN — Medication 1 LOZENGE: at 15:41

## 2023-06-29 ASSESSMENT — PAIN DESCRIPTION - DESCRIPTORS: DESCRIPTORS: ACHING

## 2023-06-29 ASSESSMENT — PAIN SCALES - GENERAL
PAINLEVEL_OUTOF10: 9
PAINLEVEL_OUTOF10: 8

## 2023-06-29 ASSESSMENT — PAIN DESCRIPTION - LOCATION
LOCATION: THROAT
LOCATION: THROAT

## 2023-06-30 LAB
BACTERIA SPEC RESP CULT: ABNORMAL
Lab: NORMAL
ORGANISM: ABNORMAL
ORGANISM: ABNORMAL
SMEAR, RESPIRATORY: ABNORMAL
THIS TEST SENT TO: NORMAL

## 2023-07-02 ENCOUNTER — APPOINTMENT (OUTPATIENT)
Dept: GENERAL RADIOLOGY | Age: 83
End: 2023-07-02
Payer: MEDICARE

## 2023-07-02 ENCOUNTER — HOSPITAL ENCOUNTER (EMERGENCY)
Age: 83
Discharge: HOME | End: 2023-07-02
Attending: EMERGENCY MEDICINE
Payer: MEDICARE

## 2023-07-02 VITALS
TEMPERATURE: 98 F | SYSTOLIC BLOOD PRESSURE: 116 MMHG | RESPIRATION RATE: 30 BRPM | HEART RATE: 95 BPM | WEIGHT: 283 LBS | BODY MASS INDEX: 41.92 KG/M2 | OXYGEN SATURATION: 97 % | HEIGHT: 69 IN | DIASTOLIC BLOOD PRESSURE: 54 MMHG

## 2023-07-02 DIAGNOSIS — R06.00 DYSPNEA, UNSPECIFIED TYPE: Primary | ICD-10-CM

## 2023-07-02 LAB
ALBUMIN SERPL-MCNC: 2.5 G/DL (ref 3.5–5.2)
ALP SERPL-CCNC: 79 U/L (ref 35–104)
ALT SERPL-CCNC: 18 U/L (ref 0–32)
ANION GAP SERPL CALCULATED.3IONS-SCNC: 5 MMOL/L (ref 7–16)
ANISOCYTOSIS: ABNORMAL
AST SERPL-CCNC: 13 U/L (ref 0–31)
B.E.: 13.2 MMOL/L (ref -3–3)
BASOPHILS # BLD: 0.01 E9/L (ref 0–0.2)
BASOPHILS NFR BLD: 0.1 % (ref 0–2)
BILIRUB DIRECT SERPL-MCNC: <0.2 MG/DL (ref 0–0.3)
BILIRUB INDIRECT SERPL-MCNC: ABNORMAL MG/DL (ref 0–1)
BILIRUB SERPL-MCNC: 0.3 MG/DL (ref 0–1.2)
BNP BLD-MCNC: 1151 PG/ML (ref 0–450)
BUN SERPL-MCNC: 10 MG/DL (ref 6–23)
CALCIUM SERPL-MCNC: 8.9 MG/DL (ref 8.6–10.2)
CHLORIDE SERPL-SCNC: 97 MMOL/L (ref 98–107)
CO2 SERPL-SCNC: 39 MMOL/L (ref 22–29)
COHB: 0.7 % (ref 0–1.5)
CREAT SERPL-MCNC: 0.4 MG/DL (ref 0.5–1)
CRITICAL: ABNORMAL
DATE ANALYZED: ABNORMAL
DATE OF COLLECTION: ABNORMAL
EKG ATRIAL RATE: 110 BPM
EKG P AXIS: 48 DEGREES
EKG P-R INTERVAL: 162 MS
EKG Q-T INTERVAL: 330 MS
EKG QRS DURATION: 84 MS
EKG QTC CALCULATION (BAZETT): 446 MS
EKG R AXIS: 26 DEGREES
EKG T AXIS: 11 DEGREES
EKG VENTRICULAR RATE: 110 BPM
EOSINOPHIL # BLD: 0.31 E9/L (ref 0.05–0.5)
EOSINOPHIL NFR BLD: 2.7 % (ref 0–6)
ERYTHROCYTE [DISTWIDTH] IN BLOOD BY AUTOMATED COUNT: 21.6 FL (ref 11.5–15)
GLUCOSE SERPL-MCNC: 122 MG/DL (ref 74–99)
HCO3: 39.5 MMOL/L (ref 22–26)
HCT VFR BLD AUTO: 28.2 % (ref 34–48)
HGB BLD-MCNC: 7.7 G/DL (ref 11.5–15.5)
HHB: 4.8 % (ref 0–5)
HYPOCHROMIA: ABNORMAL
IMM GRANULOCYTES # BLD: 0.05 E9/L
IMM GRANULOCYTES NFR BLD: 0.4 % (ref 0–5)
INFLUENZA A BY PCR: NOT DETECTED
INFLUENZA B BY PCR: NOT DETECTED
LAB: ABNORMAL
LYMPHOCYTES # BLD: 3.6 E9/L (ref 1.5–4)
LYMPHOCYTES NFR BLD: 31.9 % (ref 20–42)
Lab: ABNORMAL
MCH RBC QN AUTO: 21.8 PG (ref 26–35)
MCHC RBC AUTO-ENTMCNC: 27.3 % (ref 32–34.5)
MCV RBC AUTO: 79.9 FL (ref 80–99.9)
METHB: 0.3 % (ref 0–1.5)
MODE: ABNORMAL
MONOCYTES # BLD: 0.77 E9/L (ref 0.1–0.95)
MONOCYTES NFR BLD: 6.8 % (ref 2–12)
NEUTROPHILS # BLD: 6.56 E9/L (ref 1.8–7.3)
NEUTS SEG NFR BLD: 58.1 % (ref 43–80)
O2 CONTENT: 12 ML/DL
O2 SATURATION: 95.2 % (ref 92–98.5)
O2HB: 94.2 % (ref 94–97)
OPERATOR ID: 301
PATIENT TEMP: 37 C
PCO2: 62.5 MMHG (ref 35–45)
PH BLOOD GAS: 7.42 (ref 7.35–7.45)
PLATELET # BLD AUTO: 441 E9/L (ref 130–450)
PMV BLD AUTO: 9.9 FL (ref 7–12)
PO2: 80.4 MMHG (ref 75–100)
POLYCHROMASIA: ABNORMAL
POTASSIUM SERPL-SCNC: 3.8 MMOL/L (ref 3.5–5)
PROT SERPL-MCNC: 5.7 G/DL (ref 6.4–8.3)
RBC # BLD AUTO: 3.53 E12/L (ref 3.5–5.5)
SARS-COV-2 RDRP RESP QL NAA+PROBE: NOT DETECTED
SODIUM SERPL-SCNC: 141 MMOL/L (ref 132–146)
SOURCE, BLOOD GAS: ABNORMAL
THB: 9 G/DL (ref 11.5–16.5)
TIME ANALYZED: 1309
TROPONIN, HIGH SENSITIVITY: 25 NG/L (ref 0–9)
WBC # BLD: 11.3 E9/L (ref 4.5–11.5)

## 2023-07-02 PROCEDURE — 83880 ASSAY OF NATRIURETIC PEPTIDE: CPT

## 2023-07-02 PROCEDURE — 93010 ELECTROCARDIOGRAM REPORT: CPT | Performed by: INTERNAL MEDICINE

## 2023-07-02 PROCEDURE — 84484 ASSAY OF TROPONIN QUANT: CPT

## 2023-07-02 PROCEDURE — 99285 EMERGENCY DEPT VISIT HI MDM: CPT

## 2023-07-02 PROCEDURE — 82805 BLOOD GASES W/O2 SATURATION: CPT

## 2023-07-02 PROCEDURE — 87502 INFLUENZA DNA AMP PROBE: CPT

## 2023-07-02 PROCEDURE — 87635 SARS-COV-2 COVID-19 AMP PRB: CPT

## 2023-07-02 PROCEDURE — 93005 ELECTROCARDIOGRAM TRACING: CPT

## 2023-07-02 PROCEDURE — 80076 HEPATIC FUNCTION PANEL: CPT

## 2023-07-02 PROCEDURE — 71045 X-RAY EXAM CHEST 1 VIEW: CPT

## 2023-07-02 PROCEDURE — 85025 COMPLETE CBC W/AUTO DIFF WBC: CPT

## 2023-07-02 PROCEDURE — 80048 BASIC METABOLIC PNL TOTAL CA: CPT

## 2023-07-03 LAB
ANION GAP SERPL CALCULATED.3IONS-SCNC: 3 MMOL/L (ref 7–16)
ANISOCYTOSIS: ABNORMAL
BASOPHILS # BLD: 0.02 E9/L (ref 0–0.2)
BASOPHILS NFR BLD: 0.2 % (ref 0–2)
BUN SERPL-MCNC: 7 MG/DL (ref 6–23)
CALCIUM SERPL-MCNC: 8.9 MG/DL (ref 8.6–10.2)
CHLORIDE SERPL-SCNC: 99 MMOL/L (ref 98–107)
CO2 SERPL-SCNC: 40 MMOL/L (ref 22–29)
CREAT SERPL-MCNC: 0.4 MG/DL (ref 0.5–1)
EOSINOPHIL # BLD: 0.27 E9/L (ref 0.05–0.5)
EOSINOPHIL NFR BLD: 2.8 % (ref 0–6)
ERYTHROCYTE [DISTWIDTH] IN BLOOD BY AUTOMATED COUNT: 22.2 FL (ref 11.5–15)
GLUCOSE SERPL-MCNC: 100 MG/DL (ref 74–99)
HCT VFR BLD AUTO: 29.3 % (ref 34–48)
HGB BLD-MCNC: 7.6 G/DL (ref 11.5–15.5)
HYPOCHROMIA: ABNORMAL
IMM GRANULOCYTES # BLD: 0.03 E9/L
IMM GRANULOCYTES NFR BLD: 0.3 % (ref 0–5)
LYMPHOCYTES # BLD: 2.51 E9/L (ref 1.5–4)
LYMPHOCYTES NFR BLD: 25.7 % (ref 20–42)
MCH RBC QN AUTO: 22 PG (ref 26–35)
MCHC RBC AUTO-ENTMCNC: 25.9 % (ref 32–34.5)
MCV RBC AUTO: 84.9 FL (ref 80–99.9)
MONOCYTES # BLD: 0.78 E9/L (ref 0.1–0.95)
MONOCYTES NFR BLD: 8 % (ref 2–12)
NEUTROPHILS # BLD: 6.16 E9/L (ref 1.8–7.3)
NEUTS SEG NFR BLD: 63 % (ref 43–80)
OVALOCYTES: ABNORMAL
PLATELET # BLD AUTO: 411 E9/L (ref 130–450)
PMV BLD AUTO: 10.3 FL (ref 7–12)
POIKILOCYTES: ABNORMAL
POLYCHROMASIA: ABNORMAL
POTASSIUM SERPL-SCNC: 3.9 MMOL/L (ref 3.5–5)
RBC # BLD AUTO: 3.45 E12/L (ref 3.5–5.5)
SODIUM SERPL-SCNC: 142 MMOL/L (ref 132–146)
STOMATOCYTES: ABNORMAL
WBC # BLD: 9.8 E9/L (ref 4.5–11.5)

## 2023-07-05 LAB
Lab: NORMAL
REPORT: NORMAL
THIS TEST SENT TO: NORMAL

## 2023-07-07 LAB
ANION GAP SERPL CALCULATED.3IONS-SCNC: 10 MMOL/L (ref 7–16)
ANISOCYTOSIS: ABNORMAL
BASOPHILS # BLD: 0.04 E9/L (ref 0–0.2)
BASOPHILS NFR BLD: 0.5 % (ref 0–2)
BUN SERPL-MCNC: 12 MG/DL (ref 6–23)
CALCIUM SERPL-MCNC: 8 MG/DL (ref 8.6–10.2)
CHLORIDE SERPL-SCNC: 96 MMOL/L (ref 98–107)
CO2 SERPL-SCNC: 32 MMOL/L (ref 22–29)
CREAT SERPL-MCNC: 0.5 MG/DL (ref 0.5–1)
EOSINOPHIL # BLD: 0.27 E9/L (ref 0.05–0.5)
EOSINOPHIL NFR BLD: 3.2 % (ref 0–6)
ERYTHROCYTE [DISTWIDTH] IN BLOOD BY AUTOMATED COUNT: 25 FL (ref 11.5–15)
GLUCOSE SERPL-MCNC: 96 MG/DL (ref 74–99)
HCT VFR BLD AUTO: 31 % (ref 34–48)
HGB BLD-MCNC: 8 G/DL (ref 11.5–15.5)
HYPOCHROMIA: ABNORMAL
IMM GRANULOCYTES # BLD: 0.02 E9/L
IMM GRANULOCYTES NFR BLD: 0.2 % (ref 0–5)
LYMPHOCYTES # BLD: 2.51 E9/L (ref 1.5–4)
LYMPHOCYTES NFR BLD: 29.8 % (ref 20–42)
MCH RBC QN AUTO: 22.5 PG (ref 26–35)
MCHC RBC AUTO-ENTMCNC: 25.8 % (ref 32–34.5)
MCV RBC AUTO: 87.3 FL (ref 80–99.9)
MONOCYTES # BLD: 0.68 E9/L (ref 0.1–0.95)
MONOCYTES NFR BLD: 8.1 % (ref 2–12)
NEUTROPHILS # BLD: 4.91 E9/L (ref 1.8–7.3)
NEUTS SEG NFR BLD: 58.2 % (ref 43–80)
OVALOCYTES: ABNORMAL
PLATELET # BLD AUTO: 296 E9/L (ref 130–450)
PMV BLD AUTO: 10.7 FL (ref 7–12)
POIKILOCYTES: ABNORMAL
POLYCHROMASIA: ABNORMAL
POTASSIUM SERPL-SCNC: 3.4 MMOL/L (ref 3.5–5)
RBC # BLD AUTO: 3.55 E12/L (ref 3.5–5.5)
SODIUM SERPL-SCNC: 138 MMOL/L (ref 132–146)
WBC # BLD: 8.4 E9/L (ref 4.5–11.5)

## 2023-07-10 LAB
ANION GAP SERPL CALCULATED.3IONS-SCNC: 6 MMOL/L (ref 7–16)
ANISOCYTOSIS: ABNORMAL
BASOPHILS # BLD: 0.13 E9/L (ref 0–0.2)
BASOPHILS NFR BLD: 1.7 % (ref 0–2)
BUN SERPL-MCNC: 12 MG/DL (ref 6–23)
CALCIUM SERPL-MCNC: 8 MG/DL (ref 8.6–10.2)
CHLORIDE SERPL-SCNC: 94 MMOL/L (ref 98–107)
CO2 SERPL-SCNC: 37 MMOL/L (ref 22–29)
CREAT SERPL-MCNC: 0.5 MG/DL (ref 0.5–1)
EOSINOPHIL # BLD: 0.26 E9/L (ref 0.05–0.5)
EOSINOPHIL NFR BLD: 3.5 % (ref 0–6)
ERYTHROCYTE [DISTWIDTH] IN BLOOD BY AUTOMATED COUNT: 25.5 FL (ref 11.5–15)
GLUCOSE SERPL-MCNC: 100 MG/DL (ref 74–99)
HCT VFR BLD AUTO: 31.1 % (ref 34–48)
HGB BLD-MCNC: 8.3 G/DL (ref 11.5–15.5)
HYPOCHROMIA: ABNORMAL
LYMPHOCYTES # BLD: 2.18 E9/L (ref 1.5–4)
LYMPHOCYTES NFR BLD: 28.7 % (ref 20–42)
MCH RBC QN AUTO: 23.2 PG (ref 26–35)
MCHC RBC AUTO-ENTMCNC: 26.7 % (ref 32–34.5)
MCV RBC AUTO: 86.9 FL (ref 80–99.9)
MONOCYTES # BLD: 0.38 E9/L (ref 0.1–0.95)
MONOCYTES NFR BLD: 5.2 % (ref 2–12)
NEUTROPHILS # BLD: 4.58 E9/L (ref 1.8–7.3)
NEUTS SEG NFR BLD: 60.9 % (ref 43–80)
OVALOCYTES: ABNORMAL
PLATELET # BLD AUTO: 369 E9/L (ref 130–450)
PMV BLD AUTO: 10 FL (ref 7–12)
POIKILOCYTES: ABNORMAL
POLYCHROMASIA: ABNORMAL
POTASSIUM SERPL-SCNC: 3.4 MMOL/L (ref 3.5–5)
RBC # BLD AUTO: 3.58 E12/L (ref 3.5–5.5)
SMUDGE CELLS: ABNORMAL
SODIUM SERPL-SCNC: 137 MMOL/L (ref 132–146)
TARGET CELLS: ABNORMAL
WBC # BLD: 7.5 E9/L (ref 4.5–11.5)

## 2023-07-17 PROBLEM — Z86.718 HISTORY OF DVT (DEEP VEIN THROMBOSIS): Status: ACTIVE | Noted: 2023-07-17

## 2023-07-17 PROBLEM — Z79.01 LONG TERM (CURRENT) USE OF ANTICOAGULANTS: Status: ACTIVE | Noted: 2023-07-17

## 2023-07-19 LAB
ALBUMIN SERPL-MCNC: 2.5 G/DL (ref 3.5–5.2)
ALP SERPL-CCNC: 126 U/L (ref 35–104)
ALT SERPL-CCNC: 9 U/L (ref 0–32)
ANION GAP SERPL CALCULATED.3IONS-SCNC: 11 MMOL/L (ref 7–16)
AST SERPL-CCNC: 24 U/L (ref 0–31)
BASOPHILS # BLD: 0.08 K/UL (ref 0–0.2)
BASOPHILS NFR BLD: 1 % (ref 0–2)
BILIRUB SERPL-MCNC: 0.3 MG/DL (ref 0–1.2)
BUN SERPL-MCNC: 16 MG/DL (ref 6–23)
CALCIUM SERPL-MCNC: 8.5 MG/DL (ref 8.6–10.2)
CHLORIDE SERPL-SCNC: 94 MMOL/L (ref 98–107)
CO2 SERPL-SCNC: 33 MMOL/L (ref 22–29)
CREAT SERPL-MCNC: 0.4 MG/DL (ref 0.5–1)
EOSINOPHIL # BLD: 0.1 K/UL (ref 0.05–0.5)
EOSINOPHILS RELATIVE PERCENT: 1 % (ref 0–6)
ERYTHROCYTE [DISTWIDTH] IN BLOOD BY AUTOMATED COUNT: 24.8 % (ref 11.5–15)
GFR SERPL CREATININE-BSD FRML MDRD: >60 ML/MIN/1.73M2
GLUCOSE SERPL-MCNC: 121 MG/DL (ref 74–99)
HCT VFR BLD AUTO: 32.4 % (ref 34–48)
HGB BLD-MCNC: 8.8 G/DL (ref 11.5–15.5)
IMM GRANULOCYTES # BLD AUTO: 0.04 K/UL (ref 0–0.58)
IMM GRANULOCYTES NFR BLD: 0 % (ref 0–5)
LYMPHOCYTES NFR BLD: 3.41 K/UL (ref 1.5–4)
LYMPHOCYTES RELATIVE PERCENT: 33 % (ref 20–42)
MCH RBC QN AUTO: 23.7 PG (ref 26–35)
MCHC RBC AUTO-ENTMCNC: 27.2 G/DL (ref 32–34.5)
MCV RBC AUTO: 87.3 FL (ref 80–99.9)
MONOCYTES NFR BLD: 0.86 K/UL (ref 0.1–0.95)
MONOCYTES NFR BLD: 8 % (ref 2–12)
NEUTROPHILS NFR BLD: 56 % (ref 43–80)
NEUTS SEG NFR BLD: 5.72 K/UL (ref 1.8–7.3)
PLATELET # BLD AUTO: 533 K/UL (ref 130–450)
PMV BLD AUTO: 10.3 FL (ref 7–12)
POTASSIUM SERPL-SCNC: 4.1 MMOL/L (ref 3.5–5)
PROT SERPL-MCNC: 5.9 G/DL (ref 6.4–8.3)
RBC # BLD AUTO: 3.71 M/UL (ref 3.5–5.5)
RBC # BLD: ABNORMAL 10*6/UL
SODIUM SERPL-SCNC: 138 MMOL/L (ref 132–146)
WBC OTHER # BLD: 10.2 K/UL (ref 4.5–11.5)

## 2023-07-24 LAB
ANION GAP SERPL CALCULATED.3IONS-SCNC: 7 MMOL/L (ref 7–16)
BASOPHILS # BLD: 0.08 K/UL (ref 0–0.2)
BASOPHILS NFR BLD: 1 % (ref 0–2)
BUN SERPL-MCNC: 16 MG/DL (ref 6–23)
CALCIUM SERPL-MCNC: 8.8 MG/DL (ref 8.6–10.2)
CHLORIDE SERPL-SCNC: 100 MMOL/L (ref 98–107)
CO2 SERPL-SCNC: 31 MMOL/L (ref 22–29)
CREAT SERPL-MCNC: 0.5 MG/DL (ref 0.5–1)
EOSINOPHIL # BLD: 0.35 K/UL (ref 0.05–0.5)
EOSINOPHILS RELATIVE PERCENT: 3 % (ref 0–6)
ERYTHROCYTE [DISTWIDTH] IN BLOOD BY AUTOMATED COUNT: 25 % (ref 11.5–15)
GFR SERPL CREATININE-BSD FRML MDRD: >60 ML/MIN/1.73M2
GLUCOSE SERPL-MCNC: 98 MG/DL (ref 74–99)
HCT VFR BLD AUTO: 32.6 % (ref 34–48)
HGB BLD-MCNC: 9.1 G/DL (ref 11.5–15.5)
IMM GRANULOCYTES # BLD AUTO: 0.06 K/UL (ref 0–0.58)
IMM GRANULOCYTES NFR BLD: 0 % (ref 0–5)
LYMPHOCYTES NFR BLD: 4.09 K/UL (ref 1.5–4)
LYMPHOCYTES RELATIVE PERCENT: 31 % (ref 20–42)
MCH RBC QN AUTO: 24.5 PG (ref 26–35)
MCHC RBC AUTO-ENTMCNC: 27.9 G/DL (ref 32–34.5)
MCV RBC AUTO: 87.6 FL (ref 80–99.9)
MONOCYTES NFR BLD: 1.08 K/UL (ref 0.1–0.95)
MONOCYTES NFR BLD: 8 % (ref 2–12)
NEUTROPHILS NFR BLD: 58 % (ref 43–80)
NEUTS SEG NFR BLD: 7.74 K/UL (ref 1.8–7.3)
PLATELET # BLD AUTO: 421 K/UL (ref 130–450)
PMV BLD AUTO: 10.5 FL (ref 7–12)
POTASSIUM SERPL-SCNC: 4.5 MMOL/L (ref 3.5–5)
RBC # BLD AUTO: 3.72 M/UL (ref 3.5–5.5)
RBC # BLD: ABNORMAL 10*6/UL
RBC # BLD: ABNORMAL 10*6/UL
SODIUM SERPL-SCNC: 138 MMOL/L (ref 132–146)
WBC OTHER # BLD: 13.4 K/UL (ref 4.5–11.5)

## 2023-07-27 LAB
BASOPHILS # BLD: 0.11 K/UL (ref 0–0.2)
BASOPHILS NFR BLD: 1 % (ref 0–2)
BILIRUB UR QL STRIP: NEGATIVE
CLARITY UR: CLEAR
COLOR UR: YELLOW
EOSINOPHIL # BLD: 0.44 K/UL (ref 0.05–0.5)
EOSINOPHILS RELATIVE PERCENT: 4 % (ref 0–6)
ERYTHROCYTE [DISTWIDTH] IN BLOOD BY AUTOMATED COUNT: 24.9 % (ref 11.5–15)
GLUCOSE UR STRIP-MCNC: NEGATIVE MG/DL
HCT VFR BLD AUTO: 34.8 % (ref 34–48)
HGB BLD-MCNC: 9.6 G/DL (ref 11.5–15.5)
HGB UR QL STRIP.AUTO: NEGATIVE
KETONES UR STRIP-MCNC: NEGATIVE MG/DL
LEUKOCYTE ESTERASE UR QL STRIP: NEGATIVE
LYMPHOCYTES NFR BLD: 2.3 K/UL (ref 1.5–4)
LYMPHOCYTES RELATIVE PERCENT: 18 % (ref 20–42)
MCH RBC QN AUTO: 24.9 PG (ref 26–35)
MCHC RBC AUTO-ENTMCNC: 27.6 G/DL (ref 32–34.5)
MCV RBC AUTO: 90.2 FL (ref 80–99.9)
MONOCYTES NFR BLD: 1.21 K/UL (ref 0.1–0.95)
MONOCYTES NFR BLD: 10 % (ref 2–12)
NEUTROPHILS NFR BLD: 68 % (ref 43–80)
NEUTS SEG NFR BLD: 8.55 K/UL (ref 1.8–7.3)
NITRITE UR QL STRIP: NEGATIVE
PH UR STRIP: 7 [PH] (ref 5–9)
PLATELET # BLD AUTO: 373 K/UL (ref 130–450)
PMV BLD AUTO: 11 FL (ref 7–12)
PROT UR STRIP-MCNC: NEGATIVE MG/DL
RBC # BLD AUTO: 3.86 M/UL (ref 3.5–5.5)
RBC # BLD: ABNORMAL 10*6/UL
RBC #/AREA URNS HPF: NORMAL /HPF
SP GR UR STRIP: 1.01 (ref 1–1.03)
UROBILINOGEN UR STRIP-ACNC: 0.2 EU/DL (ref 0–1)
WBC #/AREA URNS HPF: NORMAL /HPF
WBC OTHER # BLD: 12.6 K/UL (ref 4.5–11.5)

## 2023-07-29 LAB
ANION GAP SERPL CALCULATED.3IONS-SCNC: 8 MMOL/L (ref 7–16)
BUN SERPL-MCNC: 15 MG/DL (ref 6–23)
CALCIUM SERPL-MCNC: 8.2 MG/DL (ref 8.6–10.2)
CHLORIDE SERPL-SCNC: 97 MMOL/L (ref 98–107)
CO2 SERPL-SCNC: 30 MMOL/L (ref 22–29)
CREAT SERPL-MCNC: 0.5 MG/DL (ref 0.5–1)
GFR SERPL CREATININE-BSD FRML MDRD: >60 ML/MIN/1.73M2
GLUCOSE SERPL-MCNC: 111 MG/DL (ref 74–99)
MICROORGANISM SPEC CULT: ABNORMAL
POTASSIUM SERPL-SCNC: 3.7 MMOL/L (ref 3.5–5)
SODIUM SERPL-SCNC: 135 MMOL/L (ref 132–146)
SPECIMEN DESCRIPTION: ABNORMAL

## 2023-07-31 LAB
BASOPHILS # BLD: 0.11 K/UL (ref 0–0.2)
BASOPHILS NFR BLD: 1 % (ref 0–2)
EOSINOPHIL # BLD: 1.3 K/UL (ref 0.05–0.5)
EOSINOPHILS RELATIVE PERCENT: 10 % (ref 0–6)
ERYTHROCYTE [DISTWIDTH] IN BLOOD BY AUTOMATED COUNT: 23.8 % (ref 11.5–15)
HCT VFR BLD AUTO: 36.6 % (ref 34–48)
HGB BLD-MCNC: 10.2 G/DL (ref 11.5–15.5)
IMM GRANULOCYTES # BLD AUTO: 0.05 K/UL (ref 0–0.58)
IMM GRANULOCYTES NFR BLD: 0 % (ref 0–5)
LYMPHOCYTES NFR BLD: 4.79 K/UL (ref 1.5–4)
LYMPHOCYTES RELATIVE PERCENT: 36 % (ref 20–42)
MCH RBC QN AUTO: 25.3 PG (ref 26–35)
MCHC RBC AUTO-ENTMCNC: 27.9 G/DL (ref 32–34.5)
MCV RBC AUTO: 90.8 FL (ref 80–99.9)
MONOCYTES NFR BLD: 0.95 K/UL (ref 0.1–0.95)
MONOCYTES NFR BLD: 7 % (ref 2–12)
NEUTROPHILS NFR BLD: 46 % (ref 43–80)
NEUTS SEG NFR BLD: 6.15 K/UL (ref 1.8–7.3)
PLATELET # BLD AUTO: 461 K/UL (ref 130–450)
PMV BLD AUTO: 10.6 FL (ref 7–12)
RBC # BLD AUTO: 4.03 M/UL (ref 3.5–5.5)
RBC # BLD: ABNORMAL 10*6/UL
WBC OTHER # BLD: 13.4 K/UL (ref 4.5–11.5)

## 2023-08-01 LAB
ANION GAP SERPL CALCULATED.3IONS-SCNC: 8 MMOL/L (ref 7–16)
BUN SERPL-MCNC: 16 MG/DL (ref 6–23)
CALCIUM SERPL-MCNC: 8.7 MG/DL (ref 8.6–10.2)
CHLORIDE SERPL-SCNC: 96 MMOL/L (ref 98–107)
CO2 SERPL-SCNC: 33 MMOL/L (ref 22–29)
CREAT SERPL-MCNC: 0.5 MG/DL (ref 0.5–1)
GFR SERPL CREATININE-BSD FRML MDRD: >60 ML/MIN/1.73M2
GLUCOSE SERPL-MCNC: 99 MG/DL (ref 74–99)
POTASSIUM SERPL-SCNC: 4.4 MMOL/L (ref 3.5–5)
SODIUM SERPL-SCNC: 137 MMOL/L (ref 132–146)

## 2023-08-03 LAB
ANION GAP SERPL CALCULATED.3IONS-SCNC: 11 MMOL/L (ref 7–16)
BASOPHILS # BLD: 0.11 K/UL (ref 0–0.2)
BASOPHILS NFR BLD: 1 % (ref 0–2)
BUN SERPL-MCNC: 21 MG/DL (ref 6–23)
CALCIUM SERPL-MCNC: 8.9 MG/DL (ref 8.6–10.2)
CHLORIDE SERPL-SCNC: 91 MMOL/L (ref 98–107)
CO2 SERPL-SCNC: 32 MMOL/L (ref 22–29)
CREAT SERPL-MCNC: 0.5 MG/DL (ref 0.5–1)
EOSINOPHIL # BLD: 0.57 K/UL (ref 0.05–0.5)
EOSINOPHILS RELATIVE PERCENT: 5 % (ref 0–6)
ERYTHROCYTE [DISTWIDTH] IN BLOOD BY AUTOMATED COUNT: 23.1 % (ref 11.5–15)
GFR SERPL CREATININE-BSD FRML MDRD: >60 ML/MIN/1.73M2
GLUCOSE SERPL-MCNC: 116 MG/DL (ref 74–99)
HCT VFR BLD AUTO: 35.9 % (ref 34–48)
HGB BLD-MCNC: 10.5 G/DL (ref 11.5–15.5)
LYMPHOCYTES NFR BLD: 2.83 K/UL (ref 1.5–4)
LYMPHOCYTES RELATIVE PERCENT: 25 % (ref 20–42)
MCH RBC QN AUTO: 25.4 PG (ref 26–35)
MCHC RBC AUTO-ENTMCNC: 29.2 G/DL (ref 32–34.5)
MCV RBC AUTO: 86.7 FL (ref 80–99.9)
MONOCYTES NFR BLD: 0.34 K/UL (ref 0.1–0.95)
MONOCYTES NFR BLD: 3 % (ref 2–12)
MYELOCYTES ABSOLUTE COUNT: 0.11 K/UL
MYELOCYTES: 1 %
NEUTROPHILS NFR BLD: 65 % (ref 43–80)
NEUTS SEG NFR BLD: 7.35 K/UL (ref 1.8–7.3)
PLATELET # BLD AUTO: 491 K/UL (ref 130–450)
PMV BLD AUTO: 10.5 FL (ref 7–12)
POTASSIUM SERPL-SCNC: 3.9 MMOL/L (ref 3.5–5)
RBC # BLD AUTO: 4.14 M/UL (ref 3.5–5.5)
RBC # BLD: ABNORMAL 10*6/UL
SODIUM SERPL-SCNC: 134 MMOL/L (ref 132–146)
WBC OTHER # BLD: 11.3 K/UL (ref 4.5–11.5)

## 2023-08-05 LAB
MICROORGANISM SPEC CULT: ABNORMAL
MICROORGANISM/AGENT SPEC: ABNORMAL
SPECIMEN DESCRIPTION: ABNORMAL

## 2023-08-07 LAB
ANION GAP SERPL CALCULATED.3IONS-SCNC: 11 MMOL/L (ref 7–16)
BASOPHILS # BLD: 0.08 K/UL (ref 0–0.2)
BASOPHILS NFR BLD: 1 % (ref 0–2)
BUN SERPL-MCNC: 18 MG/DL (ref 6–23)
CALCIUM SERPL-MCNC: 8.3 MG/DL (ref 8.6–10.2)
CHLORIDE SERPL-SCNC: 89 MMOL/L (ref 98–107)
CO2 SERPL-SCNC: 30 MMOL/L (ref 22–29)
CREAT SERPL-MCNC: 0.5 MG/DL (ref 0.5–1)
EOSINOPHIL # BLD: 0.48 K/UL (ref 0.05–0.5)
EOSINOPHILS RELATIVE PERCENT: 4 % (ref 0–6)
ERYTHROCYTE [DISTWIDTH] IN BLOOD BY AUTOMATED COUNT: 22.3 % (ref 11.5–15)
GFR SERPL CREATININE-BSD FRML MDRD: >60 ML/MIN/1.73M2
GLUCOSE SERPL-MCNC: 137 MG/DL (ref 74–99)
HCT VFR BLD AUTO: 36.9 % (ref 34–48)
HGB BLD-MCNC: 11 G/DL (ref 11.5–15.5)
IMM GRANULOCYTES # BLD AUTO: 0.04 K/UL (ref 0–0.58)
IMM GRANULOCYTES NFR BLD: 0 % (ref 0–5)
LYMPHOCYTES NFR BLD: 3.75 K/UL (ref 1.5–4)
LYMPHOCYTES RELATIVE PERCENT: 28 % (ref 20–42)
MCH RBC QN AUTO: 25.8 PG (ref 26–35)
MCHC RBC AUTO-ENTMCNC: 29.8 G/DL (ref 32–34.5)
MCV RBC AUTO: 86.4 FL (ref 80–99.9)
MICROORGANISM SPEC CULT: ABNORMAL
MICROORGANISM SPEC CULT: ABNORMAL
MICROORGANISM/AGENT SPEC: ABNORMAL
MONOCYTES NFR BLD: 0.91 K/UL (ref 0.1–0.95)
MONOCYTES NFR BLD: 7 % (ref 2–12)
NEUTROPHILS NFR BLD: 61 % (ref 43–80)
NEUTS SEG NFR BLD: 8.32 K/UL (ref 1.8–7.3)
PLATELET # BLD AUTO: 480 K/UL (ref 130–450)
PMV BLD AUTO: 10.4 FL (ref 7–12)
POTASSIUM SERPL-SCNC: 4.1 MMOL/L (ref 3.5–5)
RBC # BLD AUTO: 4.27 M/UL (ref 3.5–5.5)
RBC # BLD: ABNORMAL 10*6/UL
RBC # BLD: ABNORMAL 10*6/UL
SODIUM SERPL-SCNC: 130 MMOL/L (ref 132–146)
SPECIMEN DESCRIPTION: ABNORMAL
WBC OTHER # BLD: 13.6 K/UL (ref 4.5–11.5)

## 2023-11-01 ENCOUNTER — APPOINTMENT (OUTPATIENT)
Dept: GENERAL RADIOLOGY | Age: 83
End: 2023-11-01
Payer: MEDICARE

## 2023-11-01 ENCOUNTER — HOSPITAL ENCOUNTER (EMERGENCY)
Age: 83
Discharge: HOME | End: 2023-11-01
Attending: EMERGENCY MEDICINE
Payer: MEDICARE

## 2023-11-01 VITALS
OXYGEN SATURATION: 95 % | HEART RATE: 92 BPM | TEMPERATURE: 98.6 F | SYSTOLIC BLOOD PRESSURE: 104 MMHG | RESPIRATION RATE: 25 BRPM | DIASTOLIC BLOOD PRESSURE: 60 MMHG

## 2023-11-01 DIAGNOSIS — J96.12 CHRONIC HYPERCAPNIC RESPIRATORY FAILURE (HCC): Primary | ICD-10-CM

## 2023-11-01 DIAGNOSIS — N39.0 URINARY TRACT INFECTION WITHOUT HEMATURIA, SITE UNSPECIFIED: ICD-10-CM

## 2023-11-01 LAB
ALBUMIN SERPL-MCNC: 2.3 G/DL (ref 3.5–5.2)
ALP SERPL-CCNC: 107 U/L (ref 35–104)
ALT SERPL-CCNC: 24 U/L (ref 0–32)
ANION GAP SERPL CALCULATED.3IONS-SCNC: 5 MMOL/L (ref 7–16)
AST SERPL-CCNC: 49 U/L (ref 0–31)
ATYPICAL LYMPHOCYTE ABSOLUTE COUNT: 1.72 K/UL (ref 0–0.46)
ATYPICAL LYMPHOCYTES: 12 % (ref 0–4)
BACTERIA URNS QL MICRO: ABNORMAL
BASOPHILS # BLD: 0.25 K/UL (ref 0–0.2)
BASOPHILS NFR BLD: 2 % (ref 0–2)
BILIRUB SERPL-MCNC: 0.5 MG/DL (ref 0–1.2)
BILIRUB UR QL STRIP: NEGATIVE
BNP SERPL-MCNC: 264 PG/ML (ref 0–450)
BUN SERPL-MCNC: 22 MG/DL (ref 6–23)
CALCIUM SERPL-MCNC: 8.8 MG/DL (ref 8.6–10.2)
CHLORIDE SERPL-SCNC: 88 MMOL/L (ref 98–107)
CLARITY UR: ABNORMAL
CO2 SERPL-SCNC: 44 MMOL/L (ref 22–29)
COLOR UR: YELLOW
CREAT SERPL-MCNC: 0.6 MG/DL (ref 0.5–1)
CRITICAL ACTION: NORMAL
CRITICAL NOTIFICATION DATE/TIME: NORMAL
CRITICAL NOTIFICATION: NORMAL
CRITICAL VALUE READ BACK: YES
EOSINOPHIL # BLD: 0.37 K/UL (ref 0.05–0.5)
EOSINOPHILS RELATIVE PERCENT: 3 % (ref 0–6)
EPI CELLS #/AREA URNS HPF: ABNORMAL /HPF
ERYTHROCYTE [DISTWIDTH] IN BLOOD BY AUTOMATED COUNT: 19.8 % (ref 11.5–15)
FIO2: 35
FLOW RATE: 3
GFR SERPL CREATININE-BSD FRML MDRD: >60 ML/MIN/1.73M2
GLUCOSE SERPL-MCNC: 323 MG/DL (ref 74–99)
GLUCOSE UR STRIP-MCNC: NEGATIVE MG/DL
HCT VFR BLD AUTO: 41.2 % (ref 34–48)
HGB BLD-MCNC: 12.3 G/DL (ref 11.5–15.5)
HGB UR QL STRIP.AUTO: ABNORMAL
KETONES UR STRIP-MCNC: NEGATIVE MG/DL
LEUKOCYTE ESTERASE UR QL STRIP: ABNORMAL
LYMPHOCYTES NFR BLD: 1.47 K/UL (ref 1.5–4)
LYMPHOCYTES RELATIVE PERCENT: 10 % (ref 20–42)
MCH RBC QN AUTO: 28.7 PG (ref 26–35)
MCHC RBC AUTO-ENTMCNC: 29.9 G/DL (ref 32–34.5)
MCV RBC AUTO: 96.3 FL (ref 80–99.9)
MONOCYTES NFR BLD: 0.49 K/UL (ref 0.1–0.95)
MONOCYTES NFR BLD: 4 % (ref 2–12)
NEUTROPHILS NFR BLD: 70 % (ref 43–80)
NEUTS SEG NFR BLD: 9.81 K/UL (ref 1.8–7.3)
NITRITE UR QL STRIP: POSITIVE
O2 DELIVERY DEVICE: ABNORMAL
O2 DELIVERY DEVICE: ABNORMAL
PATIENT TEMP: 37
PATIENT TEMP: 37
PEEP: 8
PH UR STRIP: 7 [PH] (ref 5–9)
PLATELET # BLD AUTO: 486 K/UL (ref 130–450)
PMV BLD AUTO: 9.8 FL (ref 7–12)
POC HCO3: 42 MMOL/L (ref 22–26)
POC HCO3: 44 MMOL/L (ref 22–26)
POC O2 SATURATION: 92.4 % (ref 92–98.5)
POC O2 SATURATION: 94.9 % (ref 92–98.5)
POC PCO2 TEMP: 63.6 MM HG
POC PCO2 TEMP: 71.8 MM HG
POC PCO2: 63.6 MM HG (ref 35–45)
POC PCO2: 71.8 MM HG (ref 35–45)
POC PH TEMP: 7.39
POC PH TEMP: 7.43
POC PH: 7.39 (ref 7.35–7.45)
POC PH: 7.43 (ref 7.35–7.45)
POC PO2 TEMP: 69.2 MM HG
POC PO2 TEMP: 76.7 MM HG
POC PO2: 69.2 MM HG (ref 60–80)
POC PO2: 76.7 MM HG (ref 60–80)
POSITIVE BASE EXCESS, ART: 14.2 MMOL/L (ref 0–3)
POSITIVE BASE EXCESS, ART: 15.3 MMOL/L (ref 0–3)
POTASSIUM SERPL-SCNC: 4.4 MMOL/L (ref 3.5–5)
PRESSURE SUPPORT: 15
PROT SERPL-MCNC: 6.1 G/DL (ref 6.4–8.3)
PROT UR STRIP-MCNC: NEGATIVE MG/DL
RBC # BLD AUTO: 4.28 M/UL (ref 3.5–5.5)
RBC # BLD: NORMAL 10*6/UL
RBC #/AREA URNS HPF: ABNORMAL /HPF
SODIUM SERPL-SCNC: 137 MMOL/L (ref 132–146)
SP GR UR STRIP: 1.01 (ref 1–1.03)
TROPONIN I SERPL HS-MCNC: 35 NG/L (ref 0–9)
TROPONIN I SERPL HS-MCNC: 35 NG/L (ref 0–9)
UROBILINOGEN UR STRIP-ACNC: 0.2 EU/DL (ref 0–1)
WBC #/AREA URNS HPF: ABNORMAL /HPF
WBC OTHER # BLD: 14.1 K/UL (ref 4.5–11.5)

## 2023-11-01 PROCEDURE — 2580000003 HC RX 258: Performed by: EMERGENCY MEDICINE

## 2023-11-01 PROCEDURE — 94660 CPAP INITIATION&MGMT: CPT

## 2023-11-01 PROCEDURE — 84484 ASSAY OF TROPONIN QUANT: CPT

## 2023-11-01 PROCEDURE — 87086 URINE CULTURE/COLONY COUNT: CPT

## 2023-11-01 PROCEDURE — 83880 ASSAY OF NATRIURETIC PEPTIDE: CPT

## 2023-11-01 PROCEDURE — 87088 URINE BACTERIA CULTURE: CPT

## 2023-11-01 PROCEDURE — 85025 COMPLETE CBC W/AUTO DIFF WBC: CPT

## 2023-11-01 PROCEDURE — 82803 BLOOD GASES ANY COMBINATION: CPT

## 2023-11-01 PROCEDURE — 80053 COMPREHEN METABOLIC PANEL: CPT

## 2023-11-01 PROCEDURE — 2700000000 HC OXYGEN THERAPY PER DAY

## 2023-11-01 PROCEDURE — 93005 ELECTROCARDIOGRAM TRACING: CPT | Performed by: EMERGENCY MEDICINE

## 2023-11-01 PROCEDURE — 81001 URINALYSIS AUTO W/SCOPE: CPT

## 2023-11-01 PROCEDURE — 99285 EMERGENCY DEPT VISIT HI MDM: CPT

## 2023-11-01 PROCEDURE — 6370000000 HC RX 637 (ALT 250 FOR IP): Performed by: EMERGENCY MEDICINE

## 2023-11-01 PROCEDURE — 71045 X-RAY EXAM CHEST 1 VIEW: CPT

## 2023-11-01 RX ORDER — 0.9 % SODIUM CHLORIDE 0.9 %
1000 INTRAVENOUS SOLUTION INTRAVENOUS ONCE
Status: COMPLETED | OUTPATIENT
Start: 2023-11-01 | End: 2023-11-01

## 2023-11-01 RX ORDER — GRANULES FOR ORAL 3 G/1
3 POWDER ORAL ONCE
Status: COMPLETED | OUTPATIENT
Start: 2023-11-01 | End: 2023-11-01

## 2023-11-01 RX ADMIN — SODIUM CHLORIDE 1000 ML: 9 INJECTION, SOLUTION INTRAVENOUS at 09:03

## 2023-11-01 RX ADMIN — GRANULES FOR ORAL SOLUTION 1 PACKET: 3 POWDER ORAL at 14:58

## 2023-11-01 ASSESSMENT — PAIN SCALES - GENERAL
PAINLEVEL_OUTOF10: 0
PAINLEVEL_OUTOF10: 0

## 2023-11-01 NOTE — ED NOTES
Nurse to nurse given spoke with [facility] DIOGO Carlson, RN  11/01/23 1903

## 2023-11-02 LAB
EKG ATRIAL RATE: 94 BPM
EKG P AXIS: 57 DEGREES
EKG P-R INTERVAL: 132 MS
EKG Q-T INTERVAL: 370 MS
EKG QRS DURATION: 74 MS
EKG QTC CALCULATION (BAZETT): 462 MS
EKG R AXIS: 78 DEGREES
EKG T AXIS: 82 DEGREES
EKG VENTRICULAR RATE: 94 BPM

## 2023-11-02 PROCEDURE — 93010 ELECTROCARDIOGRAM REPORT: CPT | Performed by: INTERNAL MEDICINE

## 2023-11-03 LAB
MICROORGANISM SPEC CULT: ABNORMAL
SPECIMEN DESCRIPTION: ABNORMAL

## 2023-11-13 NOTE — ED PROVIDER NOTES
hours as needed for Sore Throat    BUDESONIDE (PULMICORT) 0.5 MG/2ML NEBULIZER SUSPENSION    Take 2 mLs by nebulization in the morning and 2 mLs in the evening. DESONIDE (DESOWEN) 0.05 % CREAM    Apply 1 application topically 2 times daily Apply topically 2 times daily to face    ERGOCALCIFEROL (VITAMIN D) 48582 UNITS CAPS    Take 50,000 Units by mouth once a week    FERROUS SULFATE (IRON 325) 325 (65 FE) MG TABLET    Take 1 tablet by mouth 2 times daily (with meals)    INSULIN LISPRO (HUMALOG) 100 UNIT/ML SOLN INJECTION VIAL    Inject 0-4 Units into the skin 4 times daily (before meals and nightly) **Corrective Low Dose Algorithm**  Glucose: Dose:    No Insulin  200-249 1 Unit  250-299 2 Units  300-349 3 Units  Over 349 4 Units and notify physician    IPRATROPIUM 0.5 MG-ALBUTEROL 2.5 MG (DUONEB) 0.5-2.5 (3) MG/3ML SOLN NEBULIZER SOLUTION    Inhale 3 mLs into the lungs 4 times daily    LATANOPROST (XALATAN) 0.005 % OPHTHALMIC SOLUTION    Place 1 drop into both eyes nightly    MELATONIN 5 MG TBDP DISINTEGRATING TABLET    Take 1 tablet by mouth nightly    MICONAZOLE (MICOTIN) 2 % POWDER    Apply topically 2 times daily. PANTOPRAZOLE (PROTONIX) 40 MG TABLET    Take 1 tablet by mouth every morning (before breakfast)    TRIAMCINOLONE (KENALOG) 0.1 % CREAM    Apply 0.1 g topically 2 times daily Apply topically 2 times daily. ALLERGIES     Patient has no known allergies. FAMILYHISTORY     No family history on file.      SOCIAL HISTORY       Social History     Tobacco Use    Smoking status: Never   Vaping Use    Vaping Use: Never used   Substance Use Topics    Alcohol use: Never    Drug use: Never       SCREENINGS        [place] Coma Scale  Eye Opening: Spontaneous  Best Verbal Response: Oriented  Best Motor Response: Obeys commands  [place] Coma Scale Score: 15                CIWA Assessment  BP: (!) 97/56  Pulse: 88           PHYSICAL EXAM  1 or more Elements     ED Triage Vitals [11/01/23 0852]   BP
if any fever over 102 , call office or report to nearest ER

## 2024-01-01 ENCOUNTER — HOSPITAL ENCOUNTER (EMERGENCY)
Age: 84
End: 2024-01-06
Attending: EMERGENCY MEDICINE
Payer: MEDICARE

## 2024-01-01 DIAGNOSIS — I46.9 CARDIAC ARREST (HCC): Primary | ICD-10-CM

## 2024-01-01 PROCEDURE — 2500000003 HC RX 250 WO HCPCS

## 2024-01-01 PROCEDURE — 6360000002 HC RX W HCPCS

## 2024-01-01 PROCEDURE — 6360000002 HC RX W HCPCS: Performed by: EMERGENCY MEDICINE

## 2024-01-01 PROCEDURE — 36556 INSERT NON-TUNNEL CV CATH: CPT

## 2024-01-01 PROCEDURE — 2500000003 HC RX 250 WO HCPCS: Performed by: EMERGENCY MEDICINE

## 2024-01-01 PROCEDURE — 99285 EMERGENCY DEPT VISIT HI MDM: CPT

## 2024-01-01 PROCEDURE — 2580000003 HC RX 258

## 2024-01-01 PROCEDURE — 92950 HEART/LUNG RESUSCITATION CPR: CPT

## 2024-01-01 RX ORDER — EPINEPHRINE IN SOD CHLOR,ISO 1 MG/10 ML
SYRINGE (ML) INTRAVENOUS DAILY PRN
Status: COMPLETED | OUTPATIENT
Start: 2024-01-01 | End: 2024-01-01

## 2024-01-01 RX ADMIN — EPINEPHRINE 1 MG: 0.1 INJECTION, SOLUTION ENDOTRACHEAL; INTRACARDIAC; INTRAVENOUS at 23:29

## 2024-01-01 RX ADMIN — EPINEPHRINE 1 MG: 0.1 INJECTION, SOLUTION ENDOTRACHEAL; INTRACARDIAC; INTRAVENOUS at 23:25

## 2024-01-01 RX ADMIN — EPINEPHRINE 1 MG: 0.1 INJECTION, SOLUTION ENDOTRACHEAL; INTRACARDIAC; INTRAVENOUS at 23:21

## 2024-01-01 RX ADMIN — SODIUM BICARBONATE 50 MEQ: 84 INJECTION, SOLUTION INTRAVENOUS at 23:22

## 2024-01-06 NOTE — ED NOTES
Spoke with  home at this time. [facility] is [facility]. Facility to  patient if released from [facility].

## 2024-01-06 NOTE — ED NOTES
[facility] was contacted: I spoke to David Rose. Reference # [id number]. She said they will follow up with possible tissue donation.

## 2024-01-06 NOTE — ED NOTES
Patient transported to [facility] at this time.

## 2024-01-06 NOTE — ED NOTES
Spoke with [facility] to clarify that patient is still a hold for tissue donation at this time. Given information for  home.

## 2024-01-06 NOTE — ED PROVIDER NOTES
[facility] EMERGENCY DEPARTMENT  EMERGENCY DEPARTMENT ENCOUNTER        Pt Name: Tristan Monterroso  MRN: [id number]  Birthdate 1940  Date of evaluation: 1/5/2024  Provider: Saeed Kirk II, DO  PCP: Italo Vargas MD  Note Started: 11:35 PM EST 1/5/24    CHIEF COMPLAINT       No chief complaint on file.      HISTORY OF PRESENT ILLNESS: 1 or more Elements        Limitations to history : Altered Mental Status    Tristan Monterroso is a 83 y.o. female brought in by EMS for cardiac arrest.  EMS states that they showed up on scene to nursing home and found patient unresponsive.  Per EMS rhythm has been in asystole throughout, became much more mottled while in the ambulance.  Unsure about how long patient was down, but likely 45 minutes.  Patient pulseless, apneic on arrival, intubated with a 7.5 ET tube.    Nursing Notes were all reviewed and agreed with or any disagreements were addressed in the HPI.      REVIEW OF EXTERNAL NOTE :       Records reviewed for medical history, surgical history, medication list.      Chart Review/External Note Review    Last Echo reviewed by Me:  No results found for: \"LVEF\", \"LVEFMODE\"          Controlled Substance Monitoring:    Acute and Chronic Pain Monitoring:        No data to display                    REVIEW OF SYSTEMS :      Positives and Pertinent negatives as per HPI.     SURGICAL HISTORY   No past surgical history on file.    CURRENTMEDICATIONS       Previous Medications    No medications on file       ALLERGIES     Patient has no allergy information on record.    FAMILYHISTORY     No family history on file.     SOCIAL HISTORY          SCREENINGS        [place] Coma Scale  OPO Notified: Yes  Date OPO Notified: 01/06/24  Time OPO Notified: 0015  OPO Referral Number: [id number]                            PHYSICAL EXAM  1 or more Elements     ED Triage Vitals   BP Temp Temp src Pulse Resp SpO2 Height Weight   -- -- -- -- -- -- -- --  Dr. Flores patient's PCP who is willing to sign death certificate.        CONSULTS:   None        PROCEDURES   Unless otherwise noted below, none       CRITICAL CARE TIME (.cct)           I am the Primary Clinician of Record.    FINAL IMPRESSION      1. Cardiac arrest (HCC)    2.           DISPOSITION/PLAN     DISPOSITION        PATIENT REFERRED TO:  No follow-up provider specified.    DISCHARGE MEDICATIONS:  New Prescriptions    No medications on file       DISCONTINUED MEDICATIONS:  Discontinued Medications    No medications on file              (Please note that portions of this note were completed with a voice recognition program.  Efforts were made to edit the dictations but occasionally words are mis-transcribed.)    Saeed Kirk II, DO (electronically signed)

## (undated) DEVICE — GAUZE,SPONGE,4"X4",16PLY,STRL,LF,10/TRAY: Brand: MEDLINE

## (undated) DEVICE — MARKER,SKIN,WI/RULER AND LABELS: Brand: MEDLINE

## (undated) DEVICE — 3M™ IOBAN™ 2 ANTIMICROBIAL INCISE DRAPE 6650EZ: Brand: IOBAN™ 2

## (undated) DEVICE — BIT DRL L180MM DIA2.5MM QUIK CPL NONRADIOPAQUE W/O STP

## (undated) DEVICE — PACK EXT II SIRUS

## (undated) DEVICE — TUBING, SUCTION, 1/4" X 10', STRAIGHT: Brand: MEDLINE

## (undated) DEVICE — DRESSING PETRO W3XL8IN N ADH OIL EMUL GZ CURAD

## (undated) DEVICE — CLOTH SURG PREP PREOPERATIVE CHLORHEXIDINE GLUC 2% READYPREP

## (undated) DEVICE — COVER,LIGHT HANDLE,FLX,1/PK: Brand: MEDLINE INDUSTRIES, INC.

## (undated) DEVICE — TOWEL,OR,DSP,ST,BLUE,STD,6/PK,12PK/CS: Brand: MEDLINE

## (undated) DEVICE — Device

## (undated) DEVICE — BANDAGE COMPR W6INXL5YD SELF ADH COHESIVE CO FLX

## (undated) DEVICE — YANKAUER,BULB TIP,W/O VENT,RIGID,STERILE: Brand: MEDLINE

## (undated) DEVICE — 3M™ STERI-DRAPE™ U-DRAPE 1015: Brand: STERI-DRAPE™

## (undated) DEVICE — SOLUTION IRRIG 1000ML 0.9% SOD CHL USP POUR PLAS BTL

## (undated) DEVICE — DOUBLE BASIN SET: Brand: MEDLINE INDUSTRIES, INC.

## (undated) DEVICE — PADDING CAST W6INXL4YD COT LO LINTING WYTEX

## (undated) DEVICE — SPONGE,LAP,12"X12",XR,ST,5/PK,40PK/CS: Brand: MEDLINE

## (undated) DEVICE — SYRINGE IRRIG 60ML SFT PLIABLE BLB EZ TO GRP 1 HND USE W/

## (undated) DEVICE — GOWN,SIRUS,POLYRNF,BRTHSLV,XLN/XL,20/CS: Brand: MEDLINE

## (undated) DEVICE — SHEET DRAPE FULL 70X100

## (undated) DEVICE — NDL CNTR 40CT FM MAG: Brand: MEDLINE INDUSTRIES, INC.

## (undated) DEVICE — BANDAGE COMPR W4INXL5YD WHT BGE POLY COT M E WRP WV HK AND

## (undated) DEVICE — INTENDED FOR TISSUE SEPARATION, AND OTHER PROCEDURES THAT REQUIRE A SHARP SURGICAL BLADE TO PUNCTURE OR CUT.: Brand: BARD-PARKER ® STAINLESS STEEL BLADES

## (undated) DEVICE — ELECTRODE PT RET AD L9FT HI MOIST COND ADH HYDRGEL CORDED

## (undated) DEVICE — GUIDEWIRE ORTH L150MM DIA1.25MM S STL NTHRD FOR 4MM CANN

## (undated) DEVICE — DRAPE 64X41IN RADIOLOGY C ARM EQUIP STER

## (undated) DEVICE — GOWN,SIRUS,FABRNF,XL,20/CS: Brand: MEDLINE

## (undated) DEVICE — BIT DRL L160MM DIA2.7MM ST CANN QUIK CPL NONRADIOLUCENT ADJ

## (undated) DEVICE — BANDAGE COMPR W6INXL12FT SMOOTH FOR LIMB EXSANG ESMARCH

## (undated) DEVICE — APPLICATOR MEDICATED 26 CC SOLUTION HI LT ORNG CHLORAPREP

## (undated) DEVICE — PADDING,UNDERCAST,COTTON, 4"X4YD STERILE: Brand: MEDLINE